# Patient Record
Sex: MALE | Race: WHITE | Employment: UNEMPLOYED | URBAN - NONMETROPOLITAN AREA
[De-identification: names, ages, dates, MRNs, and addresses within clinical notes are randomized per-mention and may not be internally consistent; named-entity substitution may affect disease eponyms.]

---

## 2018-06-25 ENCOUNTER — OFFICE VISIT (OUTPATIENT)
Dept: OTOLARYNGOLOGY | Age: 33
End: 2018-06-25
Payer: MEDICAID

## 2018-06-25 VITALS
HEIGHT: 71 IN | DIASTOLIC BLOOD PRESSURE: 88 MMHG | OXYGEN SATURATION: 99 % | BODY MASS INDEX: 24.47 KG/M2 | SYSTOLIC BLOOD PRESSURE: 112 MMHG | WEIGHT: 174.8 LBS | TEMPERATURE: 99.1 F | HEART RATE: 91 BPM | RESPIRATION RATE: 18 BRPM

## 2018-06-25 DIAGNOSIS — H93.12 TINNITUS, LEFT: ICD-10-CM

## 2018-06-25 DIAGNOSIS — H91.22 SUDDEN HEARING LOSS, LEFT: Primary | ICD-10-CM

## 2018-06-25 PROCEDURE — 99204 OFFICE O/P NEW MOD 45 MIN: CPT | Performed by: OTOLARYNGOLOGY

## 2018-06-25 RX ORDER — PREDNISONE 10 MG/1
10 TABLET ORAL SEE ADMIN INSTRUCTIONS
Qty: 60 TABLET | Refills: 0 | Status: SHIPPED | OUTPATIENT
Start: 2018-06-25 | End: 2018-07-10

## 2018-09-26 ENCOUNTER — TELEPHONE (OUTPATIENT)
Dept: OTOLARYNGOLOGY | Age: 33
End: 2018-09-26

## 2019-04-02 ENCOUNTER — NURSE TRIAGE (OUTPATIENT)
Dept: CALL CENTER | Facility: HOSPITAL | Age: 34
End: 2019-04-02

## 2019-04-02 NOTE — TELEPHONE ENCOUNTER
"Caller states he was told to go to a hospital by a physician for problems with alcohol withdrawal symptoms, not able to eat, has sweating, stated he has called several facilities, was told they could not help him. Advise that no facility can refuse to treat a person, if  Present to ED.    Reason for Disposition  • Requesting admission for alcohol abuse    Additional Information  • Negative: Coma (e.g., not moving, not talking, not responding to stimuli)  • Negative: Difficult to awaken or acting confused (e.g., disoriented, slurred speech)  • Negative: Seeing, hearing, or feeling things that are not there (i.e., visual, auditory, or tactile hallucinations)  • Negative: Slow, shallow and weak breathing  • Negative: Seizure  • Negative: Violent behavior or threatening to kill someone  • Negative: Patient attempted suicide  • Negative: Threatening suicide  • Negative: Sounds like a life-threatening emergency to the triager  • Negative: Recent significant injury, see that guideline (e.g., head, neck, chest, abdominal or extremity  injury)  • Negative: Substance abuse or dependence: question or problem related to  • Negative: Depression is main problem or symptom (e.g., feelings of sadness or hopelessness)  • Negative: SEVERE abdominal pain  • Negative: [1] Constant abdominal pain AND [2] present > 2 hours  • Negative: Blood in bowel movements (e.g., black, tarry or red blood)  (Exception: Blood on surface of BM with constipation)  • Negative: [1] Vomiting AND [2] contains red blood or black (\"coffee ground\") material  (Exception: few red streaks in vomit that only happened once)  • Negative: [1] Vomiting or dry heaves AND [2] occurring frequently (i.e., vomiting > 4 times in last 4 hours)  • Negative: Feeling very shaky (i.e., visible tremors of hands)  • Negative: Patient sounds very sick or weak to the triager  • Negative: White of the eyes have turned yellow (i.e., jaundice)  • Negative: Fever > 101.5 F (38.6 C)  • " "Negative: [1] Drinks alcohol daily AND [2] prior alcohol withdrawal seizures  • Negative: [1] Drinks alcohol daily AND [2] prior DTs (delirium tremens)    Answer Assessment - Initial Assessment Questions  1. DO YOU DRINK: \"Do you drink alcohol, including beer, wine or hard liquor?\"      Patient is having alchol withdrawal  2. HOW OFTEN: \"How many days per week do you typically drink alcohol?\"      na  3. HOW MUCH: \"How many drinks do you typically have on days when you drink?\" (one drink = 1.5 oz alcohol, 5 oz wine, 12 oz beer)      na  4. MOST: \"What is the most that you have had to drink on any one occasion in the last month?\"      na  5. LAST 24 HOURS: \"Have you had a drink within the last 24 hours?\"      na  6. DRINKING PROBLEM: \"Do you have or have you ever had a drinking problem?\"      na  7. DRUG PROBLEM: \"Are you using any other drugs?\" (e.g., yes/no; cocaine, prescription medications, etc.)      yes  8. SYMPTOMS: \"What symptoms are you currently experiencing?\" (e.g., none, tremors or shakiness, abdominal pain, vomiting, blackout spells)      Sweating, not able to eat  9. DETOX PROGRAM: \"Have you ever gone through a detox program?\"      na  10. THERAPIST: \"Do you have a counselor or therapist? Name?\"        na  11. SUPPORT: \"Who is with you now?\" \"Who do you live with?\" \"Do you have family or friends nearby who you can talk to?\" \"Are you a member of Alcoholics Anonymous?\"        na  12. PREGNANCY: \"Is there any chance you are pregnant?\" \"When was your last menstrual period?\"        na    Protocols used: ALCOHOL ABUSE AND DEPENDENCE-ADULT-AH      "

## 2019-04-09 ENCOUNTER — OFFICE VISIT (OUTPATIENT)
Dept: FAMILY MEDICINE CLINIC | Facility: CLINIC | Age: 34
End: 2019-04-09

## 2019-04-09 ENCOUNTER — RESULTS ENCOUNTER (OUTPATIENT)
Dept: FAMILY MEDICINE CLINIC | Facility: CLINIC | Age: 34
End: 2019-04-09

## 2019-04-09 VITALS
HEIGHT: 70 IN | OXYGEN SATURATION: 96 % | WEIGHT: 170 LBS | BODY MASS INDEX: 24.34 KG/M2 | DIASTOLIC BLOOD PRESSURE: 85 MMHG | HEART RATE: 93 BPM | RESPIRATION RATE: 16 BRPM | TEMPERATURE: 97 F | SYSTOLIC BLOOD PRESSURE: 138 MMHG

## 2019-04-09 DIAGNOSIS — F41.1 GENERALIZED ANXIETY DISORDER: ICD-10-CM

## 2019-04-09 DIAGNOSIS — F10.939 ALCOHOL WITHDRAWAL SYNDROME WITH COMPLICATION (HCC): ICD-10-CM

## 2019-04-09 DIAGNOSIS — R53.83 FATIGUE, UNSPECIFIED TYPE: ICD-10-CM

## 2019-04-09 DIAGNOSIS — G47.9 SLEEP DISTURBANCE: ICD-10-CM

## 2019-04-09 DIAGNOSIS — F10.939 ALCOHOL WITHDRAWAL SYNDROME WITH COMPLICATION (HCC): Primary | ICD-10-CM

## 2019-04-09 PROBLEM — I34.1 MITRAL VALVE PROLAPSE: Status: ACTIVE | Noted: 2017-03-09

## 2019-04-09 PROCEDURE — 99213 OFFICE O/P EST LOW 20 MIN: CPT | Performed by: NURSE PRACTITIONER

## 2019-04-09 RX ORDER — CARBAMAZEPINE 200 MG/1
200 TABLET ORAL 3 TIMES DAILY
Qty: 90 TABLET | Refills: 2 | Status: SHIPPED | OUTPATIENT
Start: 2019-04-09 | End: 2019-04-19

## 2019-04-09 RX ORDER — ATENOLOL 100 MG/1
100 TABLET ORAL DAILY
Qty: 30 TABLET | Refills: 2 | Status: ON HOLD | OUTPATIENT
Start: 2019-04-09

## 2019-04-09 NOTE — PROGRESS NOTES
"Chief Complaint   Patient presents with   • Drug / Alcohol Assessment        Subjective   Jackson Acuña is a 33 y.o.  male who presents today for alcohol withdrawal.    HPI:  ALCOHOL WITHDRAWAL:  He drank one fifth whiskey daily for several years.  Started drinking in his teens.  He stopped cold turkey 10 days ago.  He has been shaking and sweating since then.  He did resume drinking a \"few\" beers daily. This did not help the \"shakes and sweats\" but has helped his \"attitude.\"    He blacked out at work last week and they made him stay off until he gets a clean bill of health.  That is what brings him in today.  He admits that he did smoke some \"pot\" last week to try to quiet his symptoms.    Jackson Acuña  has a past medical history of Cat scratch fever, Mitral valve prolapse, and Substance abuse (CMS/[facility]).    Allergies   Allergen Reactions   • Penicillins Anaphylaxis     No current outpatient medications on file.  Past Medical History:   Diagnosis Date   • Cat scratch fever    • Mitral valve prolapse    • Substance abuse (CMS/[facility])      Past Surgical History:   Procedure Laterality Date   • LYMPHADENECTOMY       Social History     Socioeconomic History   • Marital status: Single     Spouse name: Not on file   • Number of children: Not on file   • Years of education: Not on file   • Highest education level: Not on file   Tobacco Use   • Smoking status: Current Every Day Smoker     Packs/day: 1.50     Years: 20.00     Pack years: 30.00     Types: Cigarettes   • Smokeless tobacco: Never Used   Substance and Sexual Activity   • Alcohol use: Yes     Comment: DAILY   • Drug use: No   • Sexual activity: Defer     Family History   Problem Relation Age of Onset   • No Known Problems Mother    • No Known Problems Father        Family history, surgical history, past medical history, Allergies and med's reviewed with patient today and updated in [employer] EMR.     ROS:  Review of Systems   Constitutional: Positive for " "diaphoresis and fatigue.   HENT: Negative.    Eyes: Negative.    Respiratory: Negative.    Cardiovascular: Negative.    Gastrointestinal: Positive for nausea and vomiting.        Initially when withdrawing.   Endocrine: Negative.    Genitourinary: Negative.    Musculoskeletal: Negative.    Skin: Negative.    Allergic/Immunologic: Negative.    Neurological: Negative.    Hematological: Negative.    Psychiatric/Behavioral: Positive for agitation, dysphoric mood and sleep disturbance. The patient is nervous/anxious.         His main reason for drinking was to go to sleep.       OBJECTIVE:  Vitals:    04/09/19 1035   BP: 138/85   BP Location: Right arm   Patient Position: Sitting   Cuff Size: Adult   Pulse: 93   Resp: 16   Temp: 97 °F (36.1 °C)   TempSrc: Tympanic   SpO2: 96%   Weight: 77.1 kg (170 lb)   Height: 177.8 cm (70\")     Physical Exam   Constitutional: He is oriented to person, place, and time. He appears well-developed and well-nourished.   HENT:   Head: Normocephalic and atraumatic.   Eyes: Conjunctivae and EOM are normal. Pupils are equal, round, and reactive to light.   Neck: Normal range of motion. Neck supple.   Cardiovascular: Normal rate, regular rhythm, normal heart sounds and intact distal pulses.   Pulmonary/Chest: Effort normal and breath sounds normal.   Abdominal: Soft. Bowel sounds are normal.   Musculoskeletal: Normal range of motion.   Neurological: He is alert and oriented to person, place, and time.   Fine tremor noted, with intention.   Skin: Skin is warm and dry. Capillary refill takes less than 2 seconds.   Psychiatric: He has a normal mood and affect. His behavior is normal. Judgment and thought content normal.   Nursing note and vitals reviewed.      ASSESSMENT/ PLAN:    Jackson was seen today for drug / alcohol assessment.    Diagnoses and all orders for this visit:    Alcohol withdrawal syndrome with complication (CMS/HCC)  -     Cancel: Comprehensive metabolic panel; Future  -     " Cancel: CBC w AUTO Differential; Future  -     Cancel: T4; Future  -     Cancel: TSH; Future  -     Cancel: Vitamin B12; Future  -     Comprehensive metabolic panel; Future  -     T4; Future  -     TSH; Future  -     CBC w AUTO Differential; Future  -     Vitamin B12; Future  -     atenolol (TENORMIN) 100 MG tablet; Take 1 tablet by mouth Daily.  -     carBAMazepine (TEGretol) 200 MG tablet; Take 1 tablet by mouth 3 (Three) Times a Day.    Generalized anxiety disorder  -     atenolol (TENORMIN) 100 MG tablet; Take 1 tablet by mouth Daily.  -     carBAMazepine (TEGretol) 200 MG tablet; Take 1 tablet by mouth 3 (Three) Times a Day.    Sleep disturbance  -     Cancel: Comprehensive metabolic panel; Future  -     Cancel: CBC w AUTO Differential; Future  -     Cancel: T4; Future  -     Cancel: TSH; Future  -     Vitamin B12; Future  -     carBAMazepine (TEGretol) 200 MG tablet; Take 1 tablet by mouth 3 (Three) Times a Day.    Fatigue, unspecified type  -     Cancel: Comprehensive metabolic panel; Future  -     Cancel: CBC w AUTO Differential; Future  -     Cancel: T4; Future  -     Cancel: TSH; Future  -     Cancel: Vitamin B12; Future  -     Comprehensive metabolic panel; Future  -     T4; Future  -     TSH; Future  -     CBC w AUTO Differential; Future  -     Vitamin B12; Future  -     carBAMazepine (TEGretol) 200 MG tablet; Take 1 tablet by mouth 3 (Three) Times a Day.    Patient instructed to purchase:  Folic acid 1 mg/daily; Ascorbic acid 100 mg BID; Vitamin B complex/daily.    Orders Placed Today:     New Medications Ordered This Visit   Medications   • atenolol (TENORMIN) 100 MG tablet     Sig: Take 1 tablet by mouth Daily.     Dispense:  30 tablet     Refill:  2   • carBAMazepine (TEGretol) 200 MG tablet     Sig: Take 1 tablet by mouth 3 (Three) Times a Day.     Dispense:  90 tablet     Refill:  2        Management Plan:     An After Visit Summary was printed and given to the patient at discharge.    Follow-up:  Return in about 2 months (around 6/9/2019) for Recheck.    Jessie Jackson, APRN 4/9/2019 10:55 AM  This note was electronically signed.

## 2019-04-10 ENCOUNTER — TELEPHONE (OUTPATIENT)
Dept: FAMILY MEDICINE CLINIC | Facility: CLINIC | Age: 34
End: 2019-04-10

## 2019-04-19 ENCOUNTER — OFFICE VISIT (OUTPATIENT)
Dept: FAMILY MEDICINE CLINIC | Facility: CLINIC | Age: 34
End: 2019-04-19

## 2019-04-19 VITALS
WEIGHT: 174.2 LBS | SYSTOLIC BLOOD PRESSURE: 121 MMHG | DIASTOLIC BLOOD PRESSURE: 85 MMHG | BODY MASS INDEX: 24.94 KG/M2 | HEIGHT: 70 IN | OXYGEN SATURATION: 98 % | HEART RATE: 64 BPM | TEMPERATURE: 98.3 F | RESPIRATION RATE: 18 BRPM

## 2019-04-19 DIAGNOSIS — F39 MOOD DISORDER (HCC): ICD-10-CM

## 2019-04-19 DIAGNOSIS — F51.01 PRIMARY INSOMNIA: ICD-10-CM

## 2019-04-19 DIAGNOSIS — R53.83 FATIGUE, UNSPECIFIED TYPE: Primary | ICD-10-CM

## 2019-04-19 DIAGNOSIS — F10.939 ALCOHOL WITHDRAWAL SYNDROME WITH COMPLICATION (HCC): ICD-10-CM

## 2019-04-19 PROCEDURE — 99213 OFFICE O/P EST LOW 20 MIN: CPT | Performed by: NURSE PRACTITIONER

## 2019-04-19 RX ORDER — BUSPIRONE HYDROCHLORIDE 10 MG/1
10 TABLET ORAL 3 TIMES DAILY
Qty: 90 TABLET | Refills: 2 | Status: SHIPPED | OUTPATIENT
Start: 2019-04-19 | End: 2019-08-26

## 2019-04-19 RX ORDER — OLANZAPINE 7.5 MG/1
7.5 TABLET ORAL
Qty: 30 TABLET | Refills: 5 | Status: SHIPPED | OUTPATIENT
Start: 2019-04-19 | End: 2019-06-07 | Stop reason: SINTOL

## 2019-04-19 NOTE — PROGRESS NOTES
Chief Complaint   Patient presents with   • Follow-up     pt would like to discuss medication given at last visit   • Depression   • Anxiety        Subjective   Jackson Acuña is a 33 y.o.  male who presents today for medication follow up.    HPI:  DEPRESSION/ANXIETY:  The treatment plan did not help.  Patient is tearful.  He is angry.  He realizes that he was medicating himself with alcohol.  He feels like he could hurt people at work because his nerves are so short (he has not harmed anyone).  At home, he wants to stay in seclusion.  His sleep is very disrupted.    Jackson Acuña  has a past medical history of Cat scratch fever, Mitral valve prolapse, and Substance abuse (CMS/HCC).    Allergies   Allergen Reactions   • Penicillins Anaphylaxis       Current Outpatient Medications:   •  atenolol (TENORMIN) 100 MG tablet, Take 1 tablet by mouth Daily., Disp: 30 tablet, Rfl: 2  •  carBAMazepine (TEGretol) 200 MG tablet, Take 1 tablet by mouth 3 (Three) Times a Day., Disp: 90 tablet, Rfl: 2  Past Medical History:   Diagnosis Date   • Cat scratch fever    • Mitral valve prolapse    • Substance abuse (CMS/HCC)      Past Surgical History:   Procedure Laterality Date   • LYMPHADENECTOMY       Social History     Socioeconomic History   • Marital status: Single     Spouse name: Not on file   • Number of children: Not on file   • Years of education: Not on file   • Highest education level: Not on file   Tobacco Use   • Smoking status: Current Every Day Smoker     Packs/day: 1.50     Years: 20.00     Pack years: 30.00     Types: Cigarettes   • Smokeless tobacco: Never Used   Substance and Sexual Activity   • Alcohol use: Yes     Comment: DAILY   • Drug use: No   • Sexual activity: Defer     Family History   Problem Relation Age of Onset   • No Known Problems Mother    • No Known Problems Father        Family history, surgical history, past medical history, Allergies and med's reviewed with patient today and updated in  "[facility] EMR.     ROS:  Review of Systems   Constitutional: Positive for activity change and fatigue.   HENT: Negative.    Eyes: Negative.    Respiratory: Negative.    Cardiovascular: Negative.    Gastrointestinal: Negative.    Endocrine: Negative.    Genitourinary: Negative.    Musculoskeletal: Positive for myalgias.   Skin: Negative.    Allergic/Immunologic: Negative.    Neurological: Negative.    Hematological: Negative.    Psychiatric/Behavioral: Positive for agitation, behavioral problems, decreased concentration, dysphoric mood and sleep disturbance. The patient is nervous/anxious.        OBJECTIVE:  Vitals:    04/19/19 0855   BP: 121/85   BP Location: Left arm   Patient Position: Sitting   Cuff Size: Adult   Pulse: 64   Resp: 18   Temp: 98.3 °F (36.8 °C)   TempSrc: Tympanic   SpO2: 98%   Weight: 79 kg (174 lb 3.2 oz)   Height: 177.8 cm (70\")     Physical Exam   Constitutional: He is oriented to person, place, and time. He appears well-developed and well-nourished.   HENT:   Head: Normocephalic and atraumatic.   Eyes: Conjunctivae and EOM are normal. Pupils are equal, round, and reactive to light.   Neck: Normal range of motion. Neck supple.   Cardiovascular: Normal rate, regular rhythm and normal heart sounds.   Pulmonary/Chest: Effort normal and breath sounds normal.   Abdominal: Soft.   Musculoskeletal: Normal range of motion.   Neurological: He is alert and oriented to person, place, and time.   Skin: Skin is warm and dry. Capillary refill takes less than 2 seconds.   Psychiatric: His behavior is normal. Judgment and thought content normal.   Tearful.  Flat affect.  Wife accompanied him today.   Nursing note and vitals reviewed.      ASSESSMENT/ PLAN:    Jackson was seen today for follow-up, depression and anxiety.    Diagnoses and all orders for this visit:    Fatigue, unspecified type    Mood disorder (CMS/HCC)  -     OLANZapine (zyPREXA) 7.5 MG tablet; Take 1 tablet by mouth every night at bedtime.  -     " busPIRone (BUSPAR) 10 MG tablet; Take 1 tablet by mouth 3 (Three) Times a Day.    Primary insomnia    Alcohol withdrawal syndrome with complication (CMS/HCC)        Orders Placed Today:     New Medications Ordered This Visit   Medications   • OLANZapine (zyPREXA) 7.5 MG tablet     Sig: Take 1 tablet by mouth every night at bedtime.     Dispense:  30 tablet     Refill:  5   • busPIRone (BUSPAR) 10 MG tablet     Sig: Take 1 tablet by mouth 3 (Three) Times a Day.     Dispense:  90 tablet     Refill:  2        Management Plan:     An After Visit Summary was printed and given to the patient at discharge.    Follow-up: Return for keep scheduled appt.    Jessie Jackson, KINGSLEY 4/19/2019 9:25 AM  This note was electronically signed.

## 2019-06-07 ENCOUNTER — OFFICE VISIT (OUTPATIENT)
Dept: FAMILY MEDICINE CLINIC | Facility: CLINIC | Age: 34
End: 2019-06-07

## 2019-06-07 VITALS
SYSTOLIC BLOOD PRESSURE: 123 MMHG | BODY MASS INDEX: 25.8 KG/M2 | HEIGHT: 70 IN | OXYGEN SATURATION: 98 % | HEART RATE: 72 BPM | WEIGHT: 180.2 LBS | RESPIRATION RATE: 19 BRPM | DIASTOLIC BLOOD PRESSURE: 83 MMHG | TEMPERATURE: 97.9 F

## 2019-06-07 DIAGNOSIS — R45.4 DIFFICULTY CONTROLLING ANGER: ICD-10-CM

## 2019-06-07 DIAGNOSIS — F41.9 ANXIETY: ICD-10-CM

## 2019-06-07 DIAGNOSIS — I10 ESSENTIAL HYPERTENSION: Primary | ICD-10-CM

## 2019-06-07 DIAGNOSIS — R07.89 COSTOCHONDRAL CHEST PAIN: ICD-10-CM

## 2019-06-07 DIAGNOSIS — R45.89 DEPRESSED MOOD: ICD-10-CM

## 2019-06-07 DIAGNOSIS — F10.10 ALCOHOL ABUSE: ICD-10-CM

## 2019-06-07 PROCEDURE — 99213 OFFICE O/P EST LOW 20 MIN: CPT | Performed by: NURSE PRACTITIONER

## 2019-06-07 NOTE — PROGRESS NOTES
Chief Complaint   Patient presents with   • Depression     Follow-Up, pt states he is getting worse   • Chest Pain     pt states only hurts when he takes a deep breath, x6 months        Subjective   Jackson Acuña is a 34 y.o.  male who presents today for follow up depression/chest pain.    HPI:  DEPRESSION:  Patient states he tolerates the Buspar well but he stopped the Zyprexa.  He has gotten in trouble at work.  He fights at home constantly.  He is on the brink of divorce.  He is tearful. He states his sleep is terrible but admits that it is mostly due to interruption.  He gets woken up (for stupid stuff) and then he is so mad that he can't go back to sleep.    CHEST PAIN:  This comes and goes.  When present, it is only with a deep breath.      NAUSEA:  Patient has some nausea at all times.    Jackson Acuña  has a past medical history of Cat scratch fever, Depression, Hypertension, Mitral valve prolapse, Mitral valve prolapse, and Substance abuse (CMS/HCC).    Allergies   Allergen Reactions   • Penicillins Anaphylaxis       Current Outpatient Medications:   •  atenolol (TENORMIN) 100 MG tablet, Take 1 tablet by mouth Daily., Disp: 30 tablet, Rfl: 2  •  busPIRone (BUSPAR) 10 MG tablet, Take 1 tablet by mouth 3 (Three) Times a Day., Disp: 90 tablet, Rfl: 2  •  Cariprazine HCl (VRAYLAR) 1.5 MG capsule capsule, Take 1 capsule by mouth Daily., Disp: 28 capsule, Rfl: 0  Past Medical History:   Diagnosis Date   • Cat scratch fever    • Depression    • Hypertension    • Mitral valve prolapse    • Mitral valve prolapse    • Substance abuse (CMS/HCC)      Past Surgical History:   Procedure Laterality Date   • ANKLE SURGERY Right    • CYST REMOVAL Left     left wrist   • LYMPHADENECTOMY     • MANDIBLE FRACTURE SURGERY       Social History     Socioeconomic History   • Marital status: Single     Spouse name: Not on file   • Number of children: Not on file   • Years of education: Not on file   • Highest education level:  Not on file   Tobacco Use   • Smoking status: Current Every Day Smoker     Packs/day: 2.00     Years: 20.00     Pack years: 40.00     Types: Cigarettes   • Smokeless tobacco: Current User   Substance and Sexual Activity   • Alcohol use: Yes     Comment: DAILY   • Drug use: Yes     Types: Marijuana   • Sexual activity: Defer     Family History   Problem Relation Age of Onset   • No Known Problems Mother    • No Known Problems Father        Family history, surgical history, past medical history, Allergies and med's reviewed with patient today and updated in [facility] EMR.     ROS:  Review of Systems   Constitutional: Negative.  Negative for fatigue, fever and unexpected weight change.   HENT: Negative.  Negative for facial swelling, sore throat and trouble swallowing.    Eyes: Negative.  Negative for photophobia, discharge and visual disturbance.   Respiratory: Negative.  Negative for cough, chest tightness and shortness of breath.         Upper chest pain with deep breath.   Cardiovascular: Negative.  Negative for chest pain and palpitations.   Gastrointestinal: Positive for nausea. Negative for abdominal pain, diarrhea and vomiting.   Endocrine: Negative.  Negative for polydipsia, polyphagia and polyuria.   Genitourinary: Negative.  Negative for dysuria, flank pain and frequency.   Musculoskeletal: Negative.  Negative for back pain, gait problem and neck pain.   Skin: Negative.  Negative for rash.   Allergic/Immunologic: Negative.    Neurological: Negative.  Negative for dizziness, light-headedness and headaches.   Hematological: Negative.    Psychiatric/Behavioral: Positive for agitation, behavioral problems, dysphoric mood and sleep disturbance. Negative for self-injury and suicidal ideas. The patient is nervous/anxious.        OBJECTIVE:  Vitals:    06/07/19 0927   BP: 123/83   BP Location: Right arm   Patient Position: Sitting   Cuff Size: Adult   Pulse: 72   Resp: 19   Temp: 97.9 °F (36.6 °C)   TempSrc: Temporal  "  SpO2: 98%   Weight: 81.7 kg (180 lb 3.2 oz)   Height: 177.8 cm (70\")     Physical Exam   Constitutional: He is oriented to person, place, and time. He appears well-developed and well-nourished. No distress.   HENT:   Head: Normocephalic and atraumatic.   Eyes: Conjunctivae and EOM are normal. Pupils are equal, round, and reactive to light.   Neck: Normal range of motion. Neck supple.   Cardiovascular: Normal rate, regular rhythm, normal heart sounds and intact distal pulses.   No murmur heard.  Pulmonary/Chest: Effort normal and breath sounds normal. No respiratory distress.   Abdominal: Soft. Bowel sounds are normal. He exhibits no distension. There is no tenderness.   Musculoskeletal: Normal range of motion. He exhibits tenderness. He exhibits no edema.   Tenderness to palpation of left upper chest wall.   Neurological: He is alert and oriented to person, place, and time.   Skin: Skin is warm and dry. Capillary refill takes less than 2 seconds. He is not diaphoretic. No erythema.   Psychiatric: Judgment and thought content normal.   Patient appears tearful and upset; then changes to some anger in discussion regarding work and home environment.    Nursing note and vitals reviewed.      ASSESSMENT/ PLAN:    Jackson was seen today for depression and chest pain.    Diagnoses and all orders for this visit:    Essential hypertension    Depressed mood  -     Cariprazine HCl (VRAYLAR) 1.5 MG capsule capsule; Take 1 capsule by mouth Daily.    Difficulty controlling anger  -     Cariprazine HCl (VRAYLAR) 1.5 MG capsule capsule; Take 1 capsule by mouth Daily.    Anxiety  -     Cariprazine HCl (VRAYLAR) 1.5 MG capsule capsule; Take 1 capsule by mouth Daily.    Alcohol abuse    Costochondral chest pain    Recommended use of OTC NSAID for control of chest wall pain.    Orders Placed Today:     New Medications Ordered This Visit   Medications   • Cariprazine HCl (VRAYLAR) 1.5 MG capsule capsule     Sig: Take 1 capsule by mouth " Daily.     Dispense:  28 capsule     Refill:  0     Order Specific Question:   Lot Number?     Answer:   [id number]     Order Specific Question:   Expiration Date?     Answer:   8/1/2021     Order Specific Question:   Quantity     Answer:   28        Management Plan:     An After Visit Summary was printed and given to the patient at discharge.    Follow-up: Return in about 3 months (around 9/7/2019) for Next scheduled follow up.    Jessie Jackson, KINGSLEY 6/7/2019 11:57 AM  This note was electronically signed.

## 2019-08-26 ENCOUNTER — OFFICE VISIT (OUTPATIENT)
Dept: FAMILY MEDICINE CLINIC | Facility: CLINIC | Age: 34
End: 2019-08-26

## 2019-08-26 VITALS
HEIGHT: 70 IN | WEIGHT: 186.8 LBS | OXYGEN SATURATION: 96 % | HEART RATE: 74 BPM | BODY MASS INDEX: 26.74 KG/M2 | RESPIRATION RATE: 16 BRPM | DIASTOLIC BLOOD PRESSURE: 73 MMHG | SYSTOLIC BLOOD PRESSURE: 111 MMHG

## 2019-08-26 DIAGNOSIS — R45.89 DEPRESSED MOOD: ICD-10-CM

## 2019-08-26 DIAGNOSIS — F41.9 ANXIETY: ICD-10-CM

## 2019-08-26 DIAGNOSIS — R45.4 DIFFICULTY CONTROLLING ANGER: ICD-10-CM

## 2019-08-26 DIAGNOSIS — R53.83 FATIGUE, UNSPECIFIED TYPE: Primary | ICD-10-CM

## 2019-08-26 DIAGNOSIS — R45.86 MOOD ALTERED: ICD-10-CM

## 2019-08-26 PROCEDURE — 99213 OFFICE O/P EST LOW 20 MIN: CPT | Performed by: NURSE PRACTITIONER

## 2019-08-26 RX ORDER — HYDROXYZINE HYDROCHLORIDE 25 MG/1
25 TABLET, FILM COATED ORAL 3 TIMES DAILY PRN
Qty: 30 TABLET | Refills: 2 | Status: SHIPPED | OUTPATIENT
Start: 2019-08-26 | End: 2020-07-01

## 2019-08-26 NOTE — PROGRESS NOTES
Chief Complaint   Patient presents with   • Med Refill     Vraylar   • Medication Problem     Buspar        Subjective   Jackson Acuña is a 34 y.o.  male who presents today for med discussion.    HPI:  Patient presents today expressing that the Vraylar has helped him more than any medication he has taken for his mood disorder. He is very pleased but unfortunately ran out of samples prior to this appointment and states his mood quickly reverted to the anger/sadness/quick temper.  He states the Buspar doesn't seem to do anything now, he has even tried taking 2 at one time.      Jackson Acuña  has a past medical history of Cat scratch fever, Depression, Hypertension, Mitral valve prolapse, Mitral valve prolapse, and Substance abuse (CMS/HCC).    Allergies   Allergen Reactions   • Penicillins Anaphylaxis       Current Outpatient Medications:   •  atenolol (TENORMIN) 100 MG tablet, Take 1 tablet by mouth Daily., Disp: 30 tablet, Rfl: 2  •  Cariprazine HCl (VRAYLAR) 1.5 MG capsule capsule, Take 2 capsules by mouth Daily., Disp: 30 capsule, Rfl: 2  •  hydrOXYzine (ATARAX) 25 MG tablet, Take 1 tablet by mouth 3 (Three) Times a Day As Needed for Anxiety., Disp: 30 tablet, Rfl: 2  Past Medical History:   Diagnosis Date   • Cat scratch fever    • Depression    • Hypertension    • Mitral valve prolapse    • Mitral valve prolapse    • Substance abuse (CMS/HCC)      Past Surgical History:   Procedure Laterality Date   • ANKLE SURGERY Right    • CYST REMOVAL Left     left wrist   • LYMPHADENECTOMY     • MANDIBLE FRACTURE SURGERY       Social History     Socioeconomic History   • Marital status: Single     Spouse name: Not on file   • Number of children: Not on file   • Years of education: Not on file   • Highest education level: Not on file   Tobacco Use   • Smoking status: Current Every Day Smoker     Packs/day: 2.00     Years: 20.00     Pack years: 40.00     Types: Cigarettes   • Smokeless tobacco: Current User  "  Substance and Sexual Activity   • Alcohol use: Yes     Comment: every other day   • Drug use: Yes     Types: Marijuana   • Sexual activity: Defer     Family History   Problem Relation Age of Onset   • No Known Problems Mother    • No Known Problems Father        Family history, surgical history, past medical history, Allergies and med's reviewed with patient today and updated in [facility] EMR.     ROS:  Review of Systems   Constitutional: Positive for fatigue. Negative for fever and unexpected weight change.   HENT: Negative.  Negative for facial swelling, sore throat and trouble swallowing.    Eyes: Negative.  Negative for photophobia, discharge and visual disturbance.   Respiratory: Negative.  Negative for cough, chest tightness and shortness of breath.    Cardiovascular: Negative.  Negative for chest pain and palpitations.   Gastrointestinal: Negative.  Negative for abdominal pain, diarrhea, nausea and vomiting.   Endocrine: Negative.  Negative for polydipsia, polyphagia and polyuria.   Genitourinary: Negative.  Negative for dysuria, flank pain and frequency.   Musculoskeletal: Negative.  Negative for back pain, gait problem and neck pain.   Skin: Negative.  Negative for rash.   Allergic/Immunologic: Negative.    Neurological: Negative.  Negative for dizziness, light-headedness and headaches.   Hematological: Negative.    Psychiatric/Behavioral: Positive for agitation, decreased concentration and dysphoric mood. Negative for self-injury and suicidal ideas. The patient is nervous/anxious.        OBJECTIVE:  Vitals:    08/26/19 1337   BP: 111/73   BP Location: Right arm   Patient Position: Sitting   Cuff Size: Adult   Pulse: 74   Resp: 16   SpO2: 96%   Weight: 84.7 kg (186 lb 12.8 oz)   Height: 177.8 cm (70\")     Physical Exam   Constitutional: He is oriented to person, place, and time. He appears well-developed and well-nourished. No distress.   HENT:   Head: Normocephalic and atraumatic.   Eyes: Conjunctivae and EOM " are normal. Pupils are equal, round, and reactive to light.   Neck: Normal range of motion. Neck supple.   Cardiovascular: Normal rate, regular rhythm, normal heart sounds and intact distal pulses.   No murmur heard.  Pulmonary/Chest: Effort normal and breath sounds normal. No respiratory distress.   Abdominal: Soft. Bowel sounds are normal. He exhibits no distension. There is no tenderness.   Musculoskeletal: Normal range of motion. He exhibits no edema.   Neurological: He is alert and oriented to person, place, and time.   Skin: Skin is warm and dry. Capillary refill takes less than 2 seconds. He is not diaphoretic. No erythema.   Psychiatric: He has a normal mood and affect. His behavior is normal. Judgment and thought content normal.   Apparently anxious but able to express himself well.   Nursing note and vitals reviewed.      ASSESSMENT/ PLAN:    Jackson was seen today for med refill and medication problem.    Diagnoses and all orders for this visit:    Fatigue, unspecified type    Depressed mood  -     Cariprazine HCl (VRAYLAR) 1.5 MG capsule capsule; Take 2 capsules by mouth Daily.    Difficulty controlling anger  -     Cariprazine HCl (VRAYLAR) 1.5 MG capsule capsule; Take 2 capsules by mouth Daily.    Anxiety  -     Cariprazine HCl (VRAYLAR) 1.5 MG capsule capsule; Take 2 capsules by mouth Daily.  -     hydrOXYzine (ATARAX) 25 MG tablet; Take 1 tablet by mouth 3 (Three) Times a Day As Needed for Anxiety.    Mood altered        Orders Placed Today:     New Medications Ordered This Visit   Medications   • Cariprazine HCl (VRAYLAR) 1.5 MG capsule capsule     Sig: Take 2 capsules by mouth Daily.     Dispense:  30 capsule     Refill:  2     Order Specific Question:   Lot Number?     Answer:   [id number]     Order Specific Question:   Expiration Date?     Answer:   8/1/2021     Order Specific Question:   Quantity     Answer:   28   • hydrOXYzine (ATARAX) 25 MG tablet     Sig: Take 1 tablet by mouth 3 (Three) Times a  Day As Needed for Anxiety.     Dispense:  30 tablet     Refill:  2     Substitute with capsule for payment purposes if needed.        Management Plan:     An After Visit Summary was printed and given to the patient at discharge.    Follow-up: Return in about 3 months (around 11/26/2019) for Next scheduled follow up.    Jessie Jackson, APRN 8/26/2019 2:15 PM  This note was electronically signed.

## 2019-08-27 DIAGNOSIS — R45.89 DEPRESSED MOOD: Primary | ICD-10-CM

## 2019-08-27 DIAGNOSIS — R45.86 MOOD ALTERED: ICD-10-CM

## 2019-08-27 DIAGNOSIS — R45.4 DIFFICULTY CONTROLLING ANGER: ICD-10-CM

## 2019-08-27 DIAGNOSIS — F41.9 ANXIETY: ICD-10-CM

## 2020-07-01 ENCOUNTER — OFFICE VISIT (OUTPATIENT)
Dept: FAMILY MEDICINE CLINIC | Facility: CLINIC | Age: 35
End: 2020-07-01

## 2020-07-01 VITALS
OXYGEN SATURATION: 97 % | WEIGHT: 188 LBS | HEART RATE: 64 BPM | SYSTOLIC BLOOD PRESSURE: 128 MMHG | HEIGHT: 70 IN | DIASTOLIC BLOOD PRESSURE: 64 MMHG | TEMPERATURE: 97.8 F | BODY MASS INDEX: 26.92 KG/M2

## 2020-07-01 DIAGNOSIS — F17.200 SMOKER: ICD-10-CM

## 2020-07-01 DIAGNOSIS — I10 ESSENTIAL HYPERTENSION: ICD-10-CM

## 2020-07-01 DIAGNOSIS — R13.19 ESOPHAGEAL DYSPHAGIA: Primary | ICD-10-CM

## 2020-07-01 PROCEDURE — 99214 OFFICE O/P EST MOD 30 MIN: CPT | Performed by: FAMILY MEDICINE

## 2020-07-01 NOTE — PROGRESS NOTES
"OFFICE VISIT NOTE:    Jackson Acuña is a 35 y.o. male who presents today for Difficulty Swallowing (feeling of foreign body in throat) and smokers cough.     Feels like a \"cue ball\" is coming up in his throat, a constant pressure in the throat, more noticeable after he coughs (has had a smoker's cough for years). Feels it on the inside not the outside of the throat - indicates by his hands near the thyroid notch. Some foods get hung or caught in there. Had tater tots the other night at home and noticed it then.     Family history of Stoll syndrome.     History of HTN, but hasn't been taking the meds lately for months he relates.     Difficulty Swallowing   This is a chronic problem. The current episode started 1 to 4 weeks ago. The problem occurs daily. The problem has been unchanged. Pertinent negatives include no abdominal pain, chest pain, chills, fatigue, fever, headaches, nausea, rash, sore throat, swollen glands or vomiting. The symptoms are aggravated by eating and drinking. He has tried rest for the symptoms. The treatment provided mild relief.   Hypertension   This is a chronic problem. The current episode started more than 1 year ago. The problem is unchanged. The problem is controlled. Pertinent negatives include no chest pain, headaches, orthopnea, palpitations, peripheral edema, PND or shortness of breath. There are no associated agents to hypertension. The current treatment provides significant improvement. There are no compliance problems.         Past medical/surgical history, Family history, Social history, Allergies and Medications have been reviewed with the patient today and are updated in [facility] EMR. See below.  Past Medical History:   Diagnosis Date   • Cat scratch fever    • Depression    • Ganglion cyst of wrist, left    • Hypertension    • Mitral valve prolapse    • Mitral valve prolapse    • Substance abuse (CMS/HCC)    • Type 2 HSV infection of penis      Past Surgical History:   Procedure " "Laterality Date   • ANKLE SURGERY Right    • CYST REMOVAL Left     left wrist   • LYMPHADENECTOMY     • MANDIBLE FRACTURE SURGERY       Family History   Problem Relation Age of Onset   • No Known Problems Mother    • No Known Problems Father      Social History     Tobacco Use   • Smoking status: Current Every Day Smoker     Packs/day: 2.00     Years: 20.00     Pack years: 40.00     Types: Cigarettes   • Smokeless tobacco: Current User   Substance Use Topics   • Alcohol use: Yes     Alcohol/week: 1.0 standard drinks     Types: 1 Shots of liquor per week     Frequency: 2-3 times a week     Drinks per session: 1 or 2     Comment: every other day   • Drug use: Yes     Types: Marijuana       ALLERGIES:  Penicillins and Ibuprofen    CURRENT MEDS:    Current Outpatient Medications:   •  atenolol (TENORMIN) 100 MG tablet, Take 1 tablet by mouth Daily., Disp: 30 tablet, Rfl: 2    REVIEW OF SYSTEMS:  Review of Systems   Constitutional: Negative for activity change, appetite change, chills, fatigue, fever, unexpected weight gain and unexpected weight loss.   HENT: Negative for sore throat and swollen glands.    Respiratory: Negative for shortness of breath.    Cardiovascular: Negative for chest pain, palpitations, orthopnea and PND.   Gastrointestinal: Negative for abdominal pain, nausea and vomiting.   Genitourinary: Negative for difficulty urinating.   Skin: Negative for rash.   Neurological: Negative for syncope and headache.       PHYSICAL EXAMINATION:  Vital Signs:  /64 (BP Location: Left arm, Patient Position: Sitting, Cuff Size: Adult)   Pulse 64   Temp 97.8 °F (36.6 °C) (Skin)   Ht 177.8 cm (70\")   Wt 85.3 kg (188 lb)   SpO2 97%   BMI 26.98 kg/m²   Vitals:    07/01/20 0945   Patient Position: Sitting       Physical Exam   Constitutional: He is oriented to person, place, and time. He appears well-developed and well-nourished. No distress.   HENT:   Head: Normocephalic and atraumatic.   Mouth/Throat: " Oropharynx is clear and moist.   Neck: Normal range of motion. Neck supple. No JVD present.   Cardiovascular: Normal rate, regular rhythm, normal heart sounds and intact distal pulses.   Pulmonary/Chest: Effort normal and breath sounds normal. No respiratory distress.   Abdominal: Soft. He exhibits no distension. There is no tenderness.   Musculoskeletal: Normal range of motion. He exhibits no edema.   Neurological: He is alert and oriented to person, place, and time. No cranial nerve deficit.   Skin: Skin is warm and dry. Capillary refill takes less than 2 seconds. No rash noted.   Psychiatric: He has a normal mood and affect. His behavior is normal.   Nursing note and vitals reviewed.      Procedures    ASSESSMENT/ PLAN:  Problem List Items Addressed This Visit        Digestive    Esophageal dysphagia - Primary    Relevant Orders    Ambulatory Referral to ENT (Otolaryngology)      Other Visit Diagnoses     Essential hypertension        Smoker        Relevant Orders    Ambulatory Referral to ENT (Otolaryngology)        DC smoking, alcohol and marijuana use.     Specific Patient Instructions:  MEDICATION Instructions: Encouraged patient to continue routine medicines as prescribed and maintain compliance. Patient instructed to report any adverse side effects or reactions to medicines promptly to the office. Patient instructed to make us aware of any OTC or herbal meds or supplement use.    DIET Recommendations: Patient instructed and provided information on the following nutrition and diet recommendations: Calorie restriction for weight reduction and maintenance. Necessity for adequate daily intake of fluids/water. Also, diet information provided in AVS for specifics.    EXERCISE Instructions: Discussed with patient the need for routine aerobic activity for cardiovascular fitness, 3 times a week for about 30 minutes. Daily exercise for increased fitness and weight reduction goals.    SMOKING  Recommendations: Counseled patient and encouraged them on smoking and tobacco cessation if applicable. Discussed the benefits to all body systems with smoking/tobacco cessation, including decreased cardiac/lung/stroke/cancer risk. Encouraged no vaping use.    HEALTH MAINTENANCE:  Counseling provided to patient/family about routine health maintenance and ANNUAL physicals/labs. Counseling on recommended Vaccinations appropriate for age needed.        Patient's Body mass index is 26.98 kg/m². BMI is within normal parameters. No follow-up required..      Medications or Orders placed this visit:  Orders Placed This Encounter   Procedures   • Ambulatory Referral to ENT (Otolaryngology)     Referral Priority:   Urgent     Referral Type:   Consultation     Referral Reason:   Specialty Services Required     Requested Specialty:   Otolaryngology     Number of Visits Requested:   1       Medications DISCONTINUED this visit:  Medications Discontinued During This Encounter   Medication Reason   • hydrOXYzine (ATARAX) 25 MG tablet Not Efficacious   • Cariprazine HCl (VRAYLAR) 3 MG capsule Not Efficacious       FOLLOW-UP:  Return for Recheck, Next scheduled follow up.    I discussed the patients findings and my recommendations with patient.  An After Visit Summary (AVS) was printed and given to the patient at discharge.        Brandon Darling MD, FAAFP  7/5/2020

## 2021-06-14 ENCOUNTER — HOSPITAL ENCOUNTER (EMERGENCY)
Age: 36
Discharge: HOME | End: 2021-06-14
Attending: EMERGENCY MEDICINE
Payer: MEDICAID

## 2021-06-14 VITALS
OXYGEN SATURATION: 95 % | HEART RATE: 83 BPM | SYSTOLIC BLOOD PRESSURE: 123 MMHG | DIASTOLIC BLOOD PRESSURE: 91 MMHG | RESPIRATION RATE: 22 BRPM | TEMPERATURE: 98.5 F

## 2021-06-14 DIAGNOSIS — T78.40XA ALLERGIC REACTION, INITIAL ENCOUNTER: Primary | ICD-10-CM

## 2021-06-14 LAB
ALBUMIN SERPL-MCNC: 4.9 G/DL (ref 3.5–5.2)
ALP BLD-CCNC: 139 U/L (ref 40–130)
ALT SERPL-CCNC: 78 U/L (ref 5–41)
ANION GAP SERPL CALCULATED.3IONS-SCNC: 15 MMOL/L (ref 7–19)
AST SERPL-CCNC: 95 U/L (ref 5–40)
BASOPHILS ABSOLUTE: 0.1 K/UL (ref 0–0.2)
BASOPHILS RELATIVE PERCENT: 0.5 % (ref 0–1)
BILIRUB SERPL-MCNC: 1.5 MG/DL (ref 0.2–1.2)
BUN BLDV-MCNC: 7 MG/DL (ref 6–20)
CALCIUM SERPL-MCNC: 9.5 MG/DL (ref 8.6–10)
CHLORIDE BLD-SCNC: 99 MMOL/L (ref 98–111)
CO2: 22 MMOL/L (ref 22–29)
CREAT SERPL-MCNC: 0.9 MG/DL (ref 0.5–1.2)
EOSINOPHILS ABSOLUTE: 0.4 K/UL (ref 0–0.6)
EOSINOPHILS RELATIVE PERCENT: 3.7 % (ref 0–5)
GFR AFRICAN AMERICAN: >59
GFR NON-AFRICAN AMERICAN: >60
GLUCOSE BLD-MCNC: 113 MG/DL (ref 74–109)
HCT VFR BLD CALC: 45.7 % (ref 42–52)
HEMOGLOBIN: 16.4 G/DL (ref 14–18)
IMMATURE GRANULOCYTES #: 0.1 K/UL
LYMPHOCYTES ABSOLUTE: 1 K/UL (ref 1.1–4.5)
LYMPHOCYTES RELATIVE PERCENT: 9.9 % (ref 20–40)
MCH RBC QN AUTO: 33.9 PG (ref 27–31)
MCHC RBC AUTO-ENTMCNC: 35.9 G/DL (ref 33–37)
MCV RBC AUTO: 94.4 FL (ref 80–94)
MONOCYTES ABSOLUTE: 0.7 K/UL (ref 0–0.9)
MONOCYTES RELATIVE PERCENT: 7.3 % (ref 0–10)
NEUTROPHILS ABSOLUTE: 7.9 K/UL (ref 1.5–7.5)
NEUTROPHILS RELATIVE PERCENT: 77.9 % (ref 50–65)
PDW BLD-RTO: 13 % (ref 11.5–14.5)
PLATELET # BLD: 249 K/UL (ref 130–400)
PMV BLD AUTO: 10.1 FL (ref 9.4–12.4)
POTASSIUM REFLEX MAGNESIUM: 3.7 MMOL/L (ref 3.5–5)
RBC # BLD: 4.84 M/UL (ref 4.7–6.1)
SODIUM BLD-SCNC: 136 MMOL/L (ref 136–145)
TOTAL CK: 180 U/L (ref 39–308)
TOTAL PROTEIN: 7.8 G/DL (ref 6.6–8.7)
WBC # BLD: 10.1 K/UL (ref 4.8–10.8)

## 2021-06-14 PROCEDURE — 36415 COLL VENOUS BLD VENIPUNCTURE: CPT

## 2021-06-14 PROCEDURE — 96375 TX/PRO/DX INJ NEW DRUG ADDON: CPT

## 2021-06-14 PROCEDURE — 82550 ASSAY OF CK (CPK): CPT

## 2021-06-14 PROCEDURE — 6360000002 HC RX W HCPCS: Performed by: NURSE PRACTITIONER

## 2021-06-14 PROCEDURE — 99282 EMERGENCY DEPT VISIT SF MDM: CPT

## 2021-06-14 PROCEDURE — 80053 COMPREHEN METABOLIC PANEL: CPT

## 2021-06-14 PROCEDURE — 96374 THER/PROPH/DIAG INJ IV PUSH: CPT

## 2021-06-14 PROCEDURE — 85025 COMPLETE CBC W/AUTO DIFF WBC: CPT

## 2021-06-14 PROCEDURE — 6360000002 HC RX W HCPCS

## 2021-06-14 PROCEDURE — 2580000003 HC RX 258: Performed by: NURSE PRACTITIONER

## 2021-06-14 RX ORDER — SULFAMETHOXAZOLE AND TRIMETHOPRIM 800; 160 MG/1; MG/1
1 TABLET ORAL 2 TIMES DAILY
Qty: 20 TABLET | Refills: 0 | Status: SHIPPED | OUTPATIENT
Start: 2021-06-14 | End: 2021-06-24

## 2021-06-14 RX ORDER — DIPHENHYDRAMINE HYDROCHLORIDE 50 MG/ML
25 INJECTION INTRAMUSCULAR; INTRAVENOUS ONCE
Status: COMPLETED | OUTPATIENT
Start: 2021-06-14 | End: 2021-06-14

## 2021-06-14 RX ORDER — ONDANSETRON 2 MG/ML
4 INJECTION INTRAMUSCULAR; INTRAVENOUS ONCE
Status: COMPLETED | OUTPATIENT
Start: 2021-06-14 | End: 2021-06-14

## 2021-06-14 RX ORDER — CEPHALEXIN 500 MG/1
500 CAPSULE ORAL 2 TIMES DAILY
Qty: 20 CAPSULE | Refills: 0 | Status: SHIPPED | OUTPATIENT
Start: 2021-06-14 | End: 2021-06-24

## 2021-06-14 RX ORDER — ONDANSETRON 2 MG/ML
INJECTION INTRAMUSCULAR; INTRAVENOUS
Status: COMPLETED
Start: 2021-06-14 | End: 2021-06-14

## 2021-06-14 RX ORDER — PREDNISONE 50 MG/1
50 TABLET ORAL DAILY
Qty: 5 TABLET | Refills: 0 | Status: SHIPPED | OUTPATIENT
Start: 2021-06-14 | End: 2021-06-19

## 2021-06-14 RX ORDER — SODIUM CHLORIDE, SODIUM LACTATE, POTASSIUM CHLORIDE, CALCIUM CHLORIDE 600; 310; 30; 20 MG/100ML; MG/100ML; MG/100ML; MG/100ML
1000 INJECTION, SOLUTION INTRAVENOUS ONCE
Status: COMPLETED | OUTPATIENT
Start: 2021-06-14 | End: 2021-06-14

## 2021-06-14 RX ADMIN — ONDANSETRON 4 MG: 2 INJECTION INTRAMUSCULAR; INTRAVENOUS at 13:11

## 2021-06-14 RX ADMIN — SODIUM CHLORIDE, SODIUM LACTATE, POTASSIUM CHLORIDE, AND CALCIUM CHLORIDE 1000 ML: 600; 310; 30; 20 INJECTION, SOLUTION INTRAVENOUS at 13:11

## 2021-06-14 RX ADMIN — ONDANSETRON HYDROCHLORIDE 4 MG: 2 INJECTION, SOLUTION INTRAMUSCULAR; INTRAVENOUS at 13:11

## 2021-06-14 RX ADMIN — DIPHENHYDRAMINE HYDROCHLORIDE 25 MG: 50 INJECTION INTRAMUSCULAR; INTRAVENOUS at 13:10

## 2021-06-14 ASSESSMENT — ENCOUNTER SYMPTOMS
NAUSEA: 1
EYE DISCHARGE: 0
FACIAL SWELLING: 0
SHORTNESS OF BREATH: 0
ABDOMINAL PAIN: 0
VOMITING: 1
EYE ITCHING: 0
CHEST TIGHTNESS: 0

## 2021-06-14 ASSESSMENT — PAIN DESCRIPTION - LOCATION: LOCATION: ARM

## 2021-06-14 ASSESSMENT — PAIN DESCRIPTION - PAIN TYPE: TYPE: ACUTE PAIN

## 2021-06-14 ASSESSMENT — PAIN SCALES - GENERAL: PAINLEVEL_OUTOF10: 4

## 2021-06-15 NOTE — ED PROVIDER NOTES
history on file. SOCIAL HISTORY       Social History     Socioeconomic History    Marital status: Single     Spouse name: Not on file    Number of children: Not on file    Years of education: Not on file    Highest education level: Not on file   Occupational History    Not on file   Tobacco Use    Smoking status: Current Every Day Smoker     Packs/day: 1.50    Smokeless tobacco: Current User   Vaping Use    Vaping Use: Never used   Substance and Sexual Activity    Alcohol use: Yes    Drug use: Not on file    Sexual activity: Not on file   Other Topics Concern    Not on file   Social History Narrative    Not on file     Social Determinants of Health     Financial Resource Strain:     Difficulty of Paying Living Expenses:    Food Insecurity:     Worried About Running Out of Food in the Last Year:     [address] in the Last Year:    Transportation Needs:     Lack of Transportation (Medical):  Lack of Transportation (Non-Medical):    Physical Activity:     Days of Exercise per Week:     Minutes of Exercise per Session:    Stress:     Feeling of Stress :    Social Connections:     Frequency of Communication with Friends and Family:     Frequency of Social Gatherings with Friends and Family:     Attends [employer] Services:     Active Member of Clubs or Organizations:     Attends Club or Organization Meetings:     Marital Status:    Intimate Partner Violence:     Fear of Current or Ex-Partner:     Emotionally Abused:     Physically Abused:     Sexually Abused:        SCREENINGS                        PHYSICAL EXAM    (up to 7 for level 4, 8 or more for level 5)     ED Triage Vitals [06/14/21 1242]   BP Temp Temp src Pulse Resp SpO2 Height Weight   (!) 133/100 98.5 °F (36.9 °C) -- 118 18 97 % -- --       Physical Exam  Vitals reviewed. Constitutional:       General: He is not in acute distress. Appearance: Normal appearance.  He is not ill-appearing, toxic-appearing or the following components:    Glucose 113 (*)     Total Bilirubin 1.5 (*)     Alkaline Phosphatase 139 (*)     ALT 78 (*)     AST 95 (*)     All other components within normal limits   CK       All other labs were within normal range or not returned as of this dictation. EMERGENCY DEPARTMENT COURSE and DIFFERENTIAL DIAGNOSIS/MDM:   Vitals:    Vitals:    06/14/21 1242 06/14/21 1401 06/14/21 1501   BP: (!) 133/100 119/89 (!) 123/91   Pulse: 118 80 83   Resp: 18 15 22   Temp: 98.5 °F (36.9 °C)     SpO2: 97% 95% 95%           MDM      REASSESSMENT      No longer nauseated. HR improved with treatment. Voices understanding to tx plan, follow up instructions and strict return parameters    CRITICAL CARE TIME       CONSULTS:  None    PROCEDURES:  Unless otherwise noted below, none     Procedures         FINAL IMPRESSION      1. Allergic reaction, initial encounter          DISPOSITION/PLAN   DISPOSITION Decision To Discharge 06/14/2021 02:52:31 PM      PATIENT REFERRED TO:  Elizabeth Tristan MD  [place] [phone]    Call   As needed      DISCHARGE MEDICATIONS:  Discharge Medication List as of 6/14/2021  3:11 PM      START taking these medications    Details   predniSONE (DELTASONE) 50 MG tablet Take 1 tablet by mouth daily for 5 days, Disp-5 tablet, R-0Normal      sulfamethoxazole-trimethoprim (BACTRIM DS) 800-160 MG per tablet Take 1 tablet by mouth 2 times daily for 10 days, Disp-20 tablet, R-0Normal      cephALEXin (KEFLEX) 500 MG capsule Take 1 capsule by mouth 2 times daily for 10 days, Disp-20 capsule, R-0Normal           Controlled Substances Monitoring:     No flowsheet data found.     (Please note that portions of this note were completed with a voice recognition program.  Efforts were made to edit the dictations but occasionally words are mis-transcribed.)    JUDY Leger - CNP (electronically signed)  Attending Emergency Physician         JUDY Leger - CNP  06/14/21 1931

## 2021-06-16 ENCOUNTER — HOSPITAL ENCOUNTER (EMERGENCY)
Age: 36
Discharge: HOME | End: 2021-06-16
Payer: MEDICAID

## 2021-06-16 ENCOUNTER — APPOINTMENT (OUTPATIENT)
Dept: CT IMAGING | Age: 36
End: 2021-06-16
Payer: MEDICAID

## 2021-06-16 VITALS
WEIGHT: 174.8 LBS | RESPIRATION RATE: 18 BRPM | BODY MASS INDEX: 24.47 KG/M2 | OXYGEN SATURATION: 97 % | DIASTOLIC BLOOD PRESSURE: 96 MMHG | HEIGHT: 71 IN | SYSTOLIC BLOOD PRESSURE: 136 MMHG | TEMPERATURE: 98 F | HEART RATE: 106 BPM

## 2021-06-16 DIAGNOSIS — T63.301D SPIDER BITE WOUND, ACCIDENTAL OR UNINTENTIONAL, SUBSEQUENT ENCOUNTER: Primary | ICD-10-CM

## 2021-06-16 LAB
ALBUMIN SERPL-MCNC: 4.6 G/DL (ref 3.5–5.2)
ALP BLD-CCNC: 130 U/L (ref 40–130)
ALT SERPL-CCNC: 91 U/L (ref 5–41)
ANION GAP SERPL CALCULATED.3IONS-SCNC: 11 MMOL/L (ref 7–19)
AST SERPL-CCNC: 99 U/L (ref 5–40)
BASOPHILS ABSOLUTE: 0 K/UL (ref 0–0.2)
BASOPHILS RELATIVE PERCENT: 0.3 % (ref 0–1)
BILIRUB SERPL-MCNC: 0.5 MG/DL (ref 0.2–1.2)
BUN BLDV-MCNC: 6 MG/DL (ref 6–20)
C-REACTIVE PROTEIN: 0.59 MG/DL (ref 0–0.5)
CALCIUM SERPL-MCNC: 9.3 MG/DL (ref 8.6–10)
CHLORIDE BLD-SCNC: 100 MMOL/L (ref 98–111)
CO2: 25 MMOL/L (ref 22–29)
CREAT SERPL-MCNC: 0.9 MG/DL (ref 0.5–1.2)
EOSINOPHILS ABSOLUTE: 0 K/UL (ref 0–0.6)
EOSINOPHILS RELATIVE PERCENT: 0 % (ref 0–5)
GFR AFRICAN AMERICAN: >59
GFR NON-AFRICAN AMERICAN: >60
GLUCOSE BLD-MCNC: 119 MG/DL (ref 74–109)
HCT VFR BLD CALC: 42.4 % (ref 42–52)
HEMOGLOBIN: 14.3 G/DL (ref 14–18)
IMMATURE GRANULOCYTES #: 0.2 K/UL
LYMPHOCYTES ABSOLUTE: 0.5 K/UL (ref 1.1–4.5)
LYMPHOCYTES RELATIVE PERCENT: 4.3 % (ref 20–40)
MCH RBC QN AUTO: 33.5 PG (ref 27–31)
MCHC RBC AUTO-ENTMCNC: 33.7 G/DL (ref 33–37)
MCV RBC AUTO: 99.3 FL (ref 80–94)
MONOCYTES ABSOLUTE: 0.7 K/UL (ref 0–0.9)
MONOCYTES RELATIVE PERCENT: 6 % (ref 0–10)
NEUTROPHILS ABSOLUTE: 10.4 K/UL (ref 1.5–7.5)
NEUTROPHILS RELATIVE PERCENT: 87.6 % (ref 50–65)
PDW BLD-RTO: 13.3 % (ref 11.5–14.5)
PLATELET # BLD: 255 K/UL (ref 130–400)
PMV BLD AUTO: 10.2 FL (ref 9.4–12.4)
POTASSIUM REFLEX MAGNESIUM: 4.3 MMOL/L (ref 3.5–5)
RBC # BLD: 4.27 M/UL (ref 4.7–6.1)
SEDIMENTATION RATE, ERYTHROCYTE: 1 MM/HR (ref 0–10)
SODIUM BLD-SCNC: 136 MMOL/L (ref 136–145)
TOTAL CK: 121 U/L (ref 39–308)
TOTAL PROTEIN: 7.3 G/DL (ref 6.6–8.7)
WBC # BLD: 11.9 K/UL (ref 4.8–10.8)

## 2021-06-16 PROCEDURE — 80053 COMPREHEN METABOLIC PANEL: CPT

## 2021-06-16 PROCEDURE — 36415 COLL VENOUS BLD VENIPUNCTURE: CPT

## 2021-06-16 PROCEDURE — 85025 COMPLETE CBC W/AUTO DIFF WBC: CPT

## 2021-06-16 PROCEDURE — 85652 RBC SED RATE AUTOMATED: CPT

## 2021-06-16 PROCEDURE — 86140 C-REACTIVE PROTEIN: CPT

## 2021-06-16 PROCEDURE — 82550 ASSAY OF CK (CPK): CPT

## 2021-06-16 PROCEDURE — 6360000004 HC RX CONTRAST MEDICATION: Performed by: PHYSICIAN ASSISTANT

## 2021-06-16 PROCEDURE — 73201 CT UPPER EXTREMITY W/DYE: CPT

## 2021-06-16 PROCEDURE — 99282 EMERGENCY DEPT VISIT SF MDM: CPT

## 2021-06-16 RX ORDER — DIPHENHYDRAMINE HCL 25 MG
25 CAPSULE ORAL EVERY 6 HOURS PRN
Qty: 20 CAPSULE | Refills: 0 | Status: SHIPPED | OUTPATIENT
Start: 2021-06-16 | End: 2021-06-22

## 2021-06-16 RX ADMIN — IOPAMIDOL 90 ML: 755 INJECTION, SOLUTION INTRAVENOUS at 18:02

## 2021-06-16 ASSESSMENT — PAIN SCALES - GENERAL: PAINLEVEL_OUTOF10: 6

## 2021-06-16 ASSESSMENT — PAIN DESCRIPTION - ORIENTATION: ORIENTATION: RIGHT

## 2021-06-16 ASSESSMENT — PAIN DESCRIPTION - PAIN TYPE: TYPE: ACUTE PAIN

## 2021-06-16 ASSESSMENT — PAIN DESCRIPTION - LOCATION: LOCATION: ARM

## 2021-06-16 NOTE — ED PROVIDER NOTES
 Fear of Current or Ex-Partner:     Emotionally Abused:     Physically Abused:     Sexually Abused:        SCREENINGS           PHYSICAL EXAM    (up to 7 forlevel 4, 8 or more for level 5)     ED Triage Vitals [06/16/21 1702]   BP Temp Temp src Pulse Resp SpO2 Height Weight   (!) 136/96 98 °F (36.7 °C) -- 106 18 97 % 5' 10.5\" (1.791 m) 174 lb 12.8 oz (79.3 kg)       Physical Exam  Vitals and nursing note reviewed. Constitutional:       General: He is not in acute distress. Appearance: Normal appearance. He is well-developed. He is not diaphoretic. HENT:      Head: Normocephalic and atraumatic. Right Ear: Tympanic membrane, ear canal and external ear normal.      Left Ear: Tympanic membrane, ear canal and external ear normal.      Nose: Nose normal.      Mouth/Throat:      Mouth: Mucous membranes are moist.   Eyes:      Pupils: Pupils are equal, round, and reactive to light. Neck:      Trachea: No tracheal deviation. Cardiovascular:      Rate and Rhythm: Normal rate and regular rhythm. Pulses: Normal pulses. Heart sounds: Normal heart sounds. No murmur heard. Pulmonary:      Effort: Pulmonary effort is normal.      Breath sounds: Normal breath sounds. No stridor. No wheezing. Chest:      Chest wall: No tenderness. Abdominal:      General: Abdomen is flat. Bowel sounds are normal. There is no distension. Palpations: Abdomen is soft. Tenderness: There is no abdominal tenderness. Musculoskeletal:         General: No signs of injury. Normal range of motion. Cervical back: Normal range of motion and neck supple. Skin:     General: Skin is warm and dry. Capillary Refill: Capillary refill takes less than 2 seconds. Comments: Consistent mild swelling with spider bite medial aspect r bicep   Neurological:      General: No focal deficit present. Mental Status: He is alert and oriented to person, place, and time. Mental status is at baseline. Psychiatric:         Mood and Affect: Mood normal.         Behavior: Behavior normal.         Thought Content: Thought content normal.         Judgment: Judgment normal.           DIAGNOSTIC RESULTS     RADIOLOGY:   Non-plain film images such as CT, Ultrasound and MRI are read by the radiologist. Plain radiographic images are visualized and preliminarilyinterpreted by No att. providers found with the below findings:      Interpretation per the Radiologist below, if available at the time of this note:    CT HUMERUS RIGHT W CONTRAST   Final Result   1. No abnormality is seen. Signed by Dr Glenn Shelton:  Labs Reviewed   C-REACTIVE PROTEIN - Abnormal; Notable for the following components:       Result Value    CRP 0.59 (*)     All other components within normal limits   CBC WITH AUTO DIFFERENTIAL - Abnormal; Notable for the following components:    WBC 11.9 (*)     RBC 4.27 (*)     MCV 99.3 (*)     MCH 33.5 (*)     Neutrophils % 87.6 (*)     Lymphocytes % 4.3 (*)     Neutrophils Absolute 10.4 (*)     Lymphocytes Absolute 0.5 (*)     All other components within normal limits   COMPREHENSIVE METABOLIC PANEL W/ REFLEX TO MG FOR LOW K - Abnormal; Notable for the following components:    Glucose 119 (*)     ALT 91 (*)     AST 99 (*)     All other components within normal limits   SEDIMENTATION RATE   CK       All other labs were within normal range or notreturned as of this dictation.     RE-ASSESSMENT        EMERGENCY DEPARTMENT COURSE and DIFFERENTIAL DIAGNOSIS/MDM:   Vitals:    Vitals:    06/16/21 1702   BP: (!) 136/96   Pulse: 106   Resp: 18   Temp: 98 °F (36.7 °C)   SpO2: 97%   Weight: 174 lb 12.8 oz (79.3 kg)   Height: 5' 10.5\" (1.791 m)       MDM  Feel this is appropriate finding consistent with bug bite as does my attending nothing acute seen on patient's CT mildly elevated blood work and inflammatory markers on appropriate medication plan will be for added onto Benadryl and close follow with Dr. Princess Weir for wound monitoring. Feel appropriate for discharge. PROCEDURES:    Procedures      FINAL IMPRESSION      1.  Spider bite wound, accidental or unintentional, subsequent encounter          DISPOSITION/PLAN   DISPOSITION Decision To Discharge 06/16/2021 08:26:58 PM      PATIENT REFERRED TO:  [facility] EMERGENCY DEPT  [address] [address] [address], DO Jim Joynertiffanie [address] [place] [address] Medical [facility]  [phone]            DISCHARGE MEDICATIONS:  Discharge Medication List as of 6/16/2021  8:40 PM      START taking these medications    Details   diphenhydrAMINE (BENADRYL) 25 MG capsule Take 1 capsule by mouth every 6 hours as needed for Itching, Disp-20 capsule, R-0Normal             (Please note that portions of this note were completed with a voice recognition program.  Efforts were made to edit the dictations but occasionallywords are mis-transcribed.)    Ania Gil[address]  06/17/21 0591

## 2021-06-17 ASSESSMENT — ENCOUNTER SYMPTOMS
EYE DISCHARGE: 0
EYE ITCHING: 0
COLOR CHANGE: 0
APNEA: 0
COUGH: 0
SHORTNESS OF BREATH: 0
BACK PAIN: 0
PHOTOPHOBIA: 0

## 2021-06-17 NOTE — ED NOTES
Pt states he was bit by an unknown insect on right bicep. States it is painful and swelling. Pt arm does appear more swollen than the left and there is an area of gray/brown/red approx jenifer size.       Dalton Chakraborty RN  06/16/21 LEIDA Montes RN  06/16/21 2049

## 2021-06-22 ENCOUNTER — OFFICE VISIT (OUTPATIENT)
Dept: SURGERY | Age: 36
End: 2021-06-22
Payer: MEDICAID

## 2021-06-22 VITALS
BODY MASS INDEX: 27.72 KG/M2 | HEART RATE: 98 BPM | TEMPERATURE: 97.9 F | WEIGHT: 198 LBS | OXYGEN SATURATION: 99 % | HEIGHT: 71 IN

## 2021-06-22 DIAGNOSIS — T63.301A SPIDER BITE WOUND, ACCIDENTAL OR UNINTENTIONAL, INITIAL ENCOUNTER: Primary | ICD-10-CM

## 2021-06-22 PROCEDURE — 99202 OFFICE O/P NEW SF 15 MIN: CPT | Performed by: SURGERY

## 2021-06-22 NOTE — PROGRESS NOTES
Mr. Jignesh Hidalgo is a 29year old male who presents with a complaint of a spider bite. He reports that it happened a week ago. He did not see the spider, but is pretty sure that is what happened. He went to the ER two days later, and was put on keflec, bactrim, and benadryl. He also had a steroid pack. He reports that he thinks the area is looking better, is having some mild pain. History reviewed. No pertinent past medical history. Past Surgical History:   Procedure Laterality Date    CYST REMOVAL Left 2016    Wrist    LYMPHADENECTOMY      Behind left ear    MANDIBLE RECONSTRUCTION  2005    TIBIA FRACTURE SURGERY Right      No current outpatient medications on file. No current facility-administered medications for this visit. Allergies: Pcn [penicillins] and Ibuprofen    History reviewed. No pertinent family history. Social History     Tobacco Use    Smoking status: Current Every Day Smoker     Packs/day: 1.50    Smokeless tobacco: Never Used   Substance Use Topics    Alcohol use: Yes     Comment: nightly whiskey       Review of Systems   Constitutional: Positive for appetite change and fatigue. HENT: Negative for congestion and sore throat. Eyes: Negative for pain and redness. Respiratory: Negative for cough and shortness of breath. Cardiovascular: Negative for chest pain and palpitations. Gastrointestinal: Positive for abdominal pain and nausea. Endocrine: Negative for polydipsia and polyphagia. Genitourinary: Negative for dysuria and hematuria. Musculoskeletal: Positive for myalgias. Negative for arthralgias. Skin: Positive for color change, rash and wound. Neurological: Positive for light-headedness. Negative for dizziness. Psychiatric/Behavioral: Negative for dysphoric mood. The patient is nervous/anxious. Physical Exam  Vitals reviewed. Constitutional:       General: He is not in acute distress. Appearance: Normal appearance.    HENT:      Head: Normocephalic and atraumatic. Eyes:      General: No scleral icterus. Pupils: Pupils are equal, round, and reactive to light. Cardiovascular:      Rate and Rhythm: Normal rate and regular rhythm. Pulmonary:      Effort: Pulmonary effort is normal. No respiratory distress. Abdominal:      General: There is no distension. Palpations: Abdomen is soft. Musculoskeletal:         General: No swelling. Normal range of motion. Cervical back: Neck supple. No rigidity. Skin:     General: Skin is warm and dry. Comments: tiny area about 2 mm of eschar, no surrounding erythema or induration   Neurological:      General: No focal deficit present. Mental Status: He is alert. Mental status is at baseline. Psychiatric:         Mood and Affect: Mood normal.         Behavior: Behavior normal.           Assessment and plan:  29year old male with spider bite to right upper arm  Discussed with the patient that the area looks very well. I recommended that he complete the medications he was given in the ER, and follow up in general surgery as needed, as this does not appear to require any surgical care. Call for any questions, concerns, or appearance of erythema.     Tash Malagon MD  7/7/2021  2:15 PM

## 2021-07-07 ASSESSMENT — ENCOUNTER SYMPTOMS
EYE REDNESS: 0
NAUSEA: 1
COLOR CHANGE: 1
EYE PAIN: 0
SHORTNESS OF BREATH: 0
ABDOMINAL PAIN: 1
COUGH: 0
SORE THROAT: 0

## 2022-09-28 ENCOUNTER — HOSPITAL ENCOUNTER (EMERGENCY)
Age: 37
Discharge: LEFT BEFORE DISCHARGE | End: 2022-09-28

## 2022-09-28 VITALS
WEIGHT: 195 LBS | HEART RATE: 88 BPM | OXYGEN SATURATION: 98 % | HEIGHT: 70 IN | SYSTOLIC BLOOD PRESSURE: 129 MMHG | BODY MASS INDEX: 27.92 KG/M2 | DIASTOLIC BLOOD PRESSURE: 101 MMHG | RESPIRATION RATE: 18 BRPM

## 2022-09-28 ASSESSMENT — PAIN DESCRIPTION - LOCATION: LOCATION: RIB CAGE;FLANK

## 2022-09-28 ASSESSMENT — PAIN SCALES - GENERAL: PAINLEVEL_OUTOF10: 10

## 2022-09-28 ASSESSMENT — PAIN DESCRIPTION - DESCRIPTORS: DESCRIPTORS: SHARP;SHOOTING;THROBBING

## 2022-09-28 ASSESSMENT — PAIN - FUNCTIONAL ASSESSMENT: PAIN_FUNCTIONAL_ASSESSMENT: 0-10

## 2022-09-28 ASSESSMENT — PAIN DESCRIPTION - ORIENTATION: ORIENTATION: RIGHT

## 2022-09-28 ASSESSMENT — PAIN DESCRIPTION - FREQUENCY: FREQUENCY: CONTINUOUS

## 2023-03-22 ENCOUNTER — HOSPITAL ENCOUNTER (INPATIENT)
Facility: HOSPITAL | Age: 38
LOS: 14 days | Discharge: NURSING, REHAB OR LONG-TERM CARE | DRG: 896 | End: 2023-04-05
Admitting: HOSPITALIST
Payer: COMMERCIAL

## 2023-03-22 ENCOUNTER — APPOINTMENT (OUTPATIENT)
Dept: GENERAL RADIOLOGY | Facility: HOSPITAL | Age: 38
DRG: 896 | End: 2023-03-22
Payer: COMMERCIAL

## 2023-03-22 DIAGNOSIS — J96.02 ACUTE RESPIRATORY FAILURE WITH HYPOXIA AND HYPERCAPNIA: ICD-10-CM

## 2023-03-22 DIAGNOSIS — J15.211 STAPHYLOCOCCUS AUREUS PNEUMONIA: Primary | ICD-10-CM

## 2023-03-22 DIAGNOSIS — J96.01 ACUTE RESPIRATORY FAILURE WITH HYPOXIA AND HYPERCAPNIA: ICD-10-CM

## 2023-03-22 PROBLEM — F10.939 ALCOHOL WITHDRAWAL (HCC): Status: ACTIVE | Noted: 2023-03-22

## 2023-03-22 PROBLEM — R74.01 TRANSAMINITIS: Status: ACTIVE | Noted: 2023-03-22

## 2023-03-22 LAB
ALBUMIN SERPL-MCNC: 4.1 G/DL (ref 3.5–5.2)
ALBUMIN/GLOB SERPL: 1.7 G/DL
ALP SERPL-CCNC: 117 U/L (ref 39–117)
ALT SERPL W P-5'-P-CCNC: 66 U/L (ref 1–41)
AMPHET+METHAMPHET UR QL: NEGATIVE
AMPHETAMINES UR QL: NEGATIVE
ANION GAP SERPL CALCULATED.3IONS-SCNC: 10 MMOL/L (ref 5–15)
APAP SERPL-MCNC: <5 MCG/ML (ref 0–30)
AST SERPL-CCNC: 50 U/L (ref 1–40)
BARBITURATES UR QL SCN: POSITIVE
BASOPHILS # BLD AUTO: 0.04 10*3/MM3 (ref 0–0.2)
BASOPHILS NFR BLD AUTO: 0.6 % (ref 0–1.5)
BENZODIAZ UR QL SCN: POSITIVE
BILIRUB SERPL-MCNC: 0.7 MG/DL (ref 0–1.2)
BUN SERPL-MCNC: 5 MG/DL (ref 6–20)
BUN/CREAT SERPL: 6.2 (ref 7–25)
BUPRENORPHINE SERPL-MCNC: NEGATIVE NG/ML
CALCIUM SPEC-SCNC: 8.5 MG/DL (ref 8.6–10.5)
CANNABINOIDS SERPL QL: POSITIVE
CHLORIDE SERPL-SCNC: 99 MMOL/L (ref 98–107)
CO2 SERPL-SCNC: 28 MMOL/L (ref 22–29)
COCAINE UR QL: NEGATIVE
CREAT SERPL-MCNC: 0.81 MG/DL (ref 0.76–1.27)
DEPRECATED RDW RBC AUTO: 49.2 FL (ref 37–54)
EGFRCR SERPLBLD CKD-EPI 2021: 117.2 ML/MIN/1.73
EOSINOPHIL # BLD AUTO: 0.05 10*3/MM3 (ref 0–0.4)
EOSINOPHIL NFR BLD AUTO: 0.8 % (ref 0.3–6.2)
ERYTHROCYTE [DISTWIDTH] IN BLOOD BY AUTOMATED COUNT: 13.8 % (ref 12.3–15.4)
ETHANOL BLD-MCNC: <10 MG/DL (ref 0–10)
ETHANOL UR QL: <0.01 %
GEN 5 2HR TROPONIN T REFLEX: 7 NG/L
GLOBULIN UR ELPH-MCNC: 2.4 GM/DL
GLUCOSE SERPL-MCNC: 93 MG/DL (ref 65–99)
HCT VFR BLD AUTO: 41.4 % (ref 37.5–51)
HGB BLD-MCNC: 14.5 G/DL (ref 13–17.7)
IMM GRANULOCYTES # BLD AUTO: 0.02 10*3/MM3 (ref 0–0.05)
IMM GRANULOCYTES NFR BLD AUTO: 0.3 % (ref 0–0.5)
LYMPHOCYTES # BLD AUTO: 1.1 10*3/MM3 (ref 0.7–3.1)
LYMPHOCYTES NFR BLD AUTO: 16.6 % (ref 19.6–45.3)
MCH RBC QN AUTO: 34.2 PG (ref 26.6–33)
MCHC RBC AUTO-ENTMCNC: 35 G/DL (ref 31.5–35.7)
MCV RBC AUTO: 97.6 FL (ref 79–97)
METHADONE UR QL SCN: NEGATIVE
MONOCYTES # BLD AUTO: 0.72 10*3/MM3 (ref 0.1–0.9)
MONOCYTES NFR BLD AUTO: 10.9 % (ref 5–12)
NEUTROPHILS NFR BLD AUTO: 4.69 10*3/MM3 (ref 1.7–7)
NEUTROPHILS NFR BLD AUTO: 70.8 % (ref 42.7–76)
NRBC BLD AUTO-RTO: 0 /100 WBC (ref 0–0.2)
OPIATES UR QL: NEGATIVE
OXYCODONE UR QL SCN: NEGATIVE
PCP UR QL SCN: NEGATIVE
PLATELET # BLD AUTO: 233 10*3/MM3 (ref 140–450)
PMV BLD AUTO: 9.7 FL (ref 6–12)
POTASSIUM SERPL-SCNC: 3.5 MMOL/L (ref 3.5–5.2)
PROPOXYPH UR QL: NEGATIVE
PROT SERPL-MCNC: 6.5 G/DL (ref 6–8.5)
RBC # BLD AUTO: 4.24 10*6/MM3 (ref 4.14–5.8)
SALICYLATES SERPL-MCNC: <0.3 MG/DL
SODIUM SERPL-SCNC: 137 MMOL/L (ref 136–145)
TRICYCLICS UR QL SCN: NEGATIVE
TROPONIN T DELTA: 0 NG/L
TROPONIN T SERPL HS-MCNC: 7 NG/L
WBC NRBC COR # BLD: 6.62 10*3/MM3 (ref 3.4–10.8)

## 2023-03-22 PROCEDURE — 85025 COMPLETE CBC W/AUTO DIFF WBC: CPT

## 2023-03-22 PROCEDURE — 25010000002 THIAMINE PER 100 MG

## 2023-03-22 PROCEDURE — 82077 ASSAY SPEC XCP UR&BREATH IA: CPT

## 2023-03-22 PROCEDURE — 25010000002 LORAZEPAM PER 2 MG: Performed by: FAMILY MEDICINE

## 2023-03-22 PROCEDURE — 80306 DRUG TEST PRSMV INSTRMNT: CPT

## 2023-03-22 PROCEDURE — 25010000002 PROCHLORPERAZINE 10 MG/2ML SOLUTION

## 2023-03-22 PROCEDURE — 25010000002 PHENOBARBITAL PER 120 MG

## 2023-03-22 PROCEDURE — 80053 COMPREHEN METABOLIC PANEL: CPT

## 2023-03-22 PROCEDURE — 25010000002 ENOXAPARIN PER 10 MG

## 2023-03-22 PROCEDURE — 71045 X-RAY EXAM CHEST 1 VIEW: CPT

## 2023-03-22 PROCEDURE — 80179 DRUG ASSAY SALICYLATE: CPT

## 2023-03-22 PROCEDURE — 93005 ELECTROCARDIOGRAM TRACING: CPT

## 2023-03-22 PROCEDURE — 93010 ELECTROCARDIOGRAM REPORT: CPT | Performed by: INTERNAL MEDICINE

## 2023-03-22 PROCEDURE — 80143 DRUG ASSAY ACETAMINOPHEN: CPT

## 2023-03-22 PROCEDURE — 84484 ASSAY OF TROPONIN QUANT: CPT

## 2023-03-22 RX ORDER — SODIUM CHLORIDE 0.9 % (FLUSH) 0.9 %
10 SYRINGE (ML) INJECTION EVERY 12 HOURS SCHEDULED
Status: DISCONTINUED | OUTPATIENT
Start: 2023-03-22 | End: 2023-04-05 | Stop reason: HOSPADM

## 2023-03-22 RX ORDER — PHENOBARBITAL SODIUM 130 MG/ML
342 INJECTION INTRAMUSCULAR ONCE
Status: COMPLETED | OUTPATIENT
Start: 2023-03-22 | End: 2023-03-22

## 2023-03-22 RX ORDER — NALOXONE HCL 0.4 MG/ML
0.4 VIAL (ML) INJECTION
Status: DISCONTINUED | OUTPATIENT
Start: 2023-03-22 | End: 2023-04-05 | Stop reason: HOSPADM

## 2023-03-22 RX ORDER — PHENOBARBITAL SODIUM 65 MG/ML
65 INJECTION INTRAMUSCULAR EVERY 12 HOURS
Status: COMPLETED | OUTPATIENT
Start: 2023-03-23 | End: 2023-03-23

## 2023-03-22 RX ORDER — PHENOBARBITAL 32.4 MG/1
32.4 TABLET ORAL ONCE
Status: DISCONTINUED | OUTPATIENT
Start: 2023-03-24 | End: 2023-03-22

## 2023-03-22 RX ORDER — PHENOBARBITAL SODIUM 130 MG/ML
260 INJECTION INTRAMUSCULAR
Status: COMPLETED | OUTPATIENT
Start: 2023-03-22 | End: 2023-03-22

## 2023-03-22 RX ORDER — LORAZEPAM 2 MG/ML
2 INJECTION INTRAMUSCULAR
Status: DISPENSED | OUTPATIENT
Start: 2023-03-22 | End: 2023-03-29

## 2023-03-22 RX ORDER — ACETAMINOPHEN 325 MG/1
650 TABLET ORAL EVERY 4 HOURS PRN
Status: DISCONTINUED | OUTPATIENT
Start: 2023-03-22 | End: 2023-04-05 | Stop reason: HOSPADM

## 2023-03-22 RX ORDER — ACETAMINOPHEN 650 MG/1
650 SUPPOSITORY RECTAL EVERY 4 HOURS PRN
Status: DISCONTINUED | OUTPATIENT
Start: 2023-03-22 | End: 2023-04-05 | Stop reason: HOSPADM

## 2023-03-22 RX ORDER — MORPHINE SULFATE 2 MG/ML
2 INJECTION, SOLUTION INTRAMUSCULAR; INTRAVENOUS EVERY 4 HOURS PRN
Status: DISPENSED | OUTPATIENT
Start: 2023-03-22 | End: 2023-04-01

## 2023-03-22 RX ORDER — SODIUM CHLORIDE, SODIUM LACTATE, POTASSIUM CHLORIDE, CALCIUM CHLORIDE 600; 310; 30; 20 MG/100ML; MG/100ML; MG/100ML; MG/100ML
75 INJECTION, SOLUTION INTRAVENOUS CONTINUOUS
Status: DISCONTINUED | OUTPATIENT
Start: 2023-03-22 | End: 2023-04-05 | Stop reason: HOSPADM

## 2023-03-22 RX ORDER — ONDANSETRON 2 MG/ML
4 INJECTION INTRAMUSCULAR; INTRAVENOUS EVERY 6 HOURS PRN
Status: DISCONTINUED | OUTPATIENT
Start: 2023-03-22 | End: 2023-04-05 | Stop reason: HOSPADM

## 2023-03-22 RX ORDER — SODIUM CHLORIDE 9 MG/ML
40 INJECTION, SOLUTION INTRAVENOUS AS NEEDED
Status: DISCONTINUED | OUTPATIENT
Start: 2023-03-22 | End: 2023-04-05 | Stop reason: HOSPADM

## 2023-03-22 RX ORDER — PROCHLORPERAZINE EDISYLATE 5 MG/ML
2.5 INJECTION INTRAMUSCULAR; INTRAVENOUS EVERY 6 HOURS PRN
Status: DISCONTINUED | OUTPATIENT
Start: 2023-03-22 | End: 2023-04-05 | Stop reason: HOSPADM

## 2023-03-22 RX ORDER — LORAZEPAM 2 MG/1
2 TABLET ORAL
Status: ACTIVE | OUTPATIENT
Start: 2023-03-22 | End: 2023-03-29

## 2023-03-22 RX ORDER — PHENOBARBITAL 32.4 MG/1
32.4 TABLET ORAL EVERY 12 HOURS
Status: DISCONTINUED | OUTPATIENT
Start: 2023-03-24 | End: 2023-03-24

## 2023-03-22 RX ORDER — ENOXAPARIN SODIUM 100 MG/ML
40 INJECTION SUBCUTANEOUS DAILY
Status: DISCONTINUED | OUTPATIENT
Start: 2023-03-22 | End: 2023-04-05 | Stop reason: HOSPADM

## 2023-03-22 RX ORDER — LORAZEPAM 0.5 MG/1
1 TABLET ORAL
Status: ACTIVE | OUTPATIENT
Start: 2023-03-22 | End: 2023-03-29

## 2023-03-22 RX ORDER — SODIUM CHLORIDE 0.9 % (FLUSH) 0.9 %
10 SYRINGE (ML) INJECTION AS NEEDED
Status: DISCONTINUED | OUTPATIENT
Start: 2023-03-22 | End: 2023-04-05 | Stop reason: HOSPADM

## 2023-03-22 RX ORDER — ACETAMINOPHEN 160 MG/5ML
650 SOLUTION ORAL EVERY 4 HOURS PRN
Status: DISCONTINUED | OUTPATIENT
Start: 2023-03-22 | End: 2023-03-23 | Stop reason: SDUPTHER

## 2023-03-22 RX ORDER — ONDANSETRON 4 MG/1
4 TABLET, FILM COATED ORAL EVERY 6 HOURS PRN
Status: DISCONTINUED | OUTPATIENT
Start: 2023-03-22 | End: 2023-04-05 | Stop reason: HOSPADM

## 2023-03-22 RX ORDER — LORAZEPAM 2 MG/ML
2 INJECTION INTRAMUSCULAR ONCE
Status: COMPLETED | OUTPATIENT
Start: 2023-03-22 | End: 2023-03-22

## 2023-03-22 RX ORDER — LORAZEPAM 2 MG/ML
2 INJECTION INTRAMUSCULAR
Status: ACTIVE | OUTPATIENT
Start: 2023-03-22 | End: 2023-03-29

## 2023-03-22 RX ORDER — NICOTINE 21 MG/24HR
1 PATCH, TRANSDERMAL 24 HOURS TRANSDERMAL EVERY 24 HOURS
Status: DISCONTINUED | OUTPATIENT
Start: 2023-03-22 | End: 2023-04-05 | Stop reason: HOSPADM

## 2023-03-22 RX ORDER — LORAZEPAM 2 MG/ML
1 INJECTION INTRAMUSCULAR
Status: DISPENSED | OUTPATIENT
Start: 2023-03-22 | End: 2023-03-29

## 2023-03-22 RX ORDER — ATENOLOL 50 MG/1
100 TABLET ORAL DAILY
Status: DISCONTINUED | OUTPATIENT
Start: 2023-03-22 | End: 2023-04-05 | Stop reason: HOSPADM

## 2023-03-22 RX ORDER — PHENOBARBITAL 32.4 MG/1
32.4 TABLET ORAL ONCE
Status: DISCONTINUED | OUTPATIENT
Start: 2023-03-25 | End: 2023-03-22

## 2023-03-22 RX ORDER — PHENOBARBITAL 32.4 MG/1
32.4 TABLET ORAL ONCE
Status: DISCONTINUED | OUTPATIENT
Start: 2023-03-23 | End: 2023-03-22

## 2023-03-22 RX ADMIN — THIAMINE HYDROCHLORIDE 250 MG: 100 INJECTION, SOLUTION INTRAMUSCULAR; INTRAVENOUS at 21:00

## 2023-03-22 RX ADMIN — PHENOBARBITAL SODIUM 260 MG: 130 INJECTION INTRAMUSCULAR at 08:06

## 2023-03-22 RX ADMIN — ENOXAPARIN SODIUM 40 MG: 40 INJECTION SUBCUTANEOUS at 03:47

## 2023-03-22 RX ADMIN — THIAMINE HYDROCHLORIDE 250 MG: 100 INJECTION, SOLUTION INTRAMUSCULAR; INTRAVENOUS at 08:12

## 2023-03-22 RX ADMIN — PHENOBARBITAL SODIUM 260 MG: 130 INJECTION INTRAMUSCULAR at 11:07

## 2023-03-22 RX ADMIN — NICOTINE 1 PATCH: 21 PATCH, EXTENDED RELEASE TRANSDERMAL at 03:47

## 2023-03-22 RX ADMIN — Medication 10 ML: at 21:00

## 2023-03-22 RX ADMIN — SODIUM CHLORIDE, POTASSIUM CHLORIDE, SODIUM LACTATE AND CALCIUM CHLORIDE 150 ML/HR: 600; 310; 30; 20 INJECTION, SOLUTION INTRAVENOUS at 03:47

## 2023-03-22 RX ADMIN — THIAMINE HYDROCHLORIDE 250 MG: 100 INJECTION, SOLUTION INTRAMUSCULAR; INTRAVENOUS at 15:05

## 2023-03-22 RX ADMIN — PROCHLORPERAZINE EDISYLATE 2.5 MG: 5 INJECTION INTRAMUSCULAR; INTRAVENOUS at 03:17

## 2023-03-22 RX ADMIN — SODIUM CHLORIDE, POTASSIUM CHLORIDE, SODIUM LACTATE AND CALCIUM CHLORIDE 150 ML/HR: 600; 310; 30; 20 INJECTION, SOLUTION INTRAVENOUS at 20:23

## 2023-03-22 RX ADMIN — ATENOLOL 100 MG: 50 TABLET ORAL at 08:09

## 2023-03-22 RX ADMIN — PHENOBARBITAL SODIUM 342 MG: 130 INJECTION INTRAMUSCULAR; INTRAVENOUS at 05:16

## 2023-03-22 RX ADMIN — LORAZEPAM 2 MG: 2 INJECTION INTRAMUSCULAR; INTRAVENOUS at 20:23

## 2023-03-22 NOTE — PAYOR COMM NOTE
"  [facility]  Case Managment Extender   Tracey Hay  (P) [phone]  (F) [phone]      [employer] Provider ID# [id number]      Juan A Sky \"Ti\" (37 y.o. Male)     Date of Birth   1985    Social Security Number       Address   [facility] [place] [address]    Home Phone   [phone]    MRN   [id number]       Evangelical   Roman [employer]    Marital Status                               Admission Date   3/22/23    Admission Type   Urgent    Admitting Provider   Harshal Jackson MD    Attending Provider   Harshal Jackson MD    Department, Room/Bed   [address], 411/1       Discharge Date       Discharge Disposition       Discharge Destination                               Attending Provider: Harshal Jackson MD    Allergies: Penicillins, Ibuprofen    Isolation: None   Infection: None   Code Status: CPR    Ht: --   Wt: 85.4 kg (188 lb 3.2 oz)    Admission Cmt: None   Principal Problem: Alcohol withdrawal (HCC) [F10.939]                 Active Insurance as of 3/22/2023     Primary Coverage     Payor Plan Insurance Group Employer/Plan Group    [employer] [place] [employer]      Payor Plan Address Payor Plan Phone Number Payor Plan Fax Number Effective Dates    [address] [address] [phone]  3/21/2023 - None Entered    [facility] [id number]       Subscriber Name Subscriber Birth Date Member ID       JUAN A SKY 1985 [id number]                 Emergency Contacts      (Rel.) Home Phone Work Phone Mobile Phone    CHRISSIE SKY (Spouse) -- -- [phone]               History & Physical      Harshal Jackson MD at 03/22/23 [facility]                [facility] Medicine Admission      Date of Admission: 3/22/2023      Primary Care Physician: Brandon Darling MD    Chief Complaint:   Withdrawal    HPI:  This patient is a transfer from [facility]  Patient tells " me that he drank a large amount of alcohol on Monday night and subsequently woke up Tuesday morning feeling very unwell.  He started having nausea and vomiting associated with a headache and one episode of syncope that lasted approximately 2 seconds that was a result of him vomiting so hard and excessively.  He then started develop rapid breathing which brought on episodes of facial numbness and cramping in his hands (I suspect that he was hyperventilating as he says that his wife told him to calm down and controlled his breathing).  Later in the day he then started developing tremors and became quite anxious/nervous and was concerned that he was having a stroke or seizure.  He then started developing some shortness of breath, and aching in his chest from the vomiting, and palpitations.  Patient denies any other systemic symptoms on review.      Patient smokes marijuana on occasion but does not need to use any other drugs.    Patient's blood work on admission is remarkable for calcium of 8.5, ALT 66, and AST 50.    Concurrent Medical History:  has a past medical history of Cat scratch fever, Depression, Ganglion cyst of wrist, left, Hypertension, Mitral valve prolapse, Mitral valve prolapse, Substance abuse (HCC), and Type 2 HSV infection of penis.    Past Surgical History:  has a past surgical history that includes Lymphadenectomy; Ankle surgery (Right); Mandible fracture surgery; and Cyst Removal (Left).    Family History: family history includes No Known Problems in his father and mother.     Social History:  reports that he has been smoking cigarettes. He has a 40.00 pack-year smoking history. He has never used smokeless tobacco. He reports current alcohol use of about 1.0 standard drink per week. He reports current drug use. Frequency: 28.00 times per week. Drug: Marijuana.    Allergies:   Allergies   Allergen Reactions   • Penicillins Anaphylaxis   • Ibuprofen Nausea And Vomiting, Provider Review Needed and  "Other (See Comments)     Intolerance per paper chart from Dr Darling's private practice. Has taken so much of it over the years, he gets abdominal cramps and pain.   GI concerns       Medications:   Prior to Admission medications    Medication Sig Start Date End Date Taking? Authorizing Provider   atenolol (TENORMIN) 100 MG tablet Take 1 tablet by mouth Daily. 4/9/19   Jessie Jackson, KINGSLEY       Review of Systems:  Review of Systems   Constitutional: Positive for activity change, appetite change and diaphoresis. Negative for fever.   HENT: Negative for congestion, ear pain, rhinorrhea, sinus pressure, sneezing and sore throat.    Eyes: Negative for photophobia and visual disturbance.   Respiratory: Positive for shortness of breath. Negative for cough, choking, wheezing and stridor.    Cardiovascular: Positive for chest pain (\"when he \"doesn't have alcohol at home\") and palpitations. Negative for leg swelling.   Gastrointestinal: Positive for nausea (yesterday morning) and vomiting (can't keep any fluids down). Negative for abdominal distention, abdominal pain, constipation and diarrhea.   Genitourinary: Positive for decreased urine volume. Negative for difficulty urinating, dysuria, frequency, hematuria and urgency.   Musculoskeletal: Negative for arthralgias, joint swelling and myalgias.   Skin: Negative for color change, rash and wound.   Neurological: Positive for tremors (full body), syncope (\"couple of seconds\" because \"puking so hard\"), numbness (both arms went numb whilst tremors were in full force and hand started cramping) and headaches. Negative for seizures, facial asymmetry and weakness.        Independence face was asleep, thought he was \"going to black out\", hands starting cramping up   Psychiatric/Behavioral: Positive for agitation (\"worried having a seizure\"). Negative for confusion and hallucinations. The patient is nervous/anxious.       Otherwise complete ROS is negative except as mentioned " above.    Physical Exam:   Temp:  [98.9 °F (37.2 °C)-99.5 °F (37.5 °C)] 98.9 °F (37.2 °C)  Heart Rate:  [75-89] 75  Resp:  [20] 20  BP: (136-142)/(82-89) 142/82  Physical Exam  Vitals and nursing note reviewed.   Constitutional:       General: He is awake. He is not in acute distress.     Appearance: He is ill-appearing. He is not toxic-appearing or diaphoretic.   HENT:      Head: Normocephalic and atraumatic.      Nose: Nose normal. No congestion.      Mouth/Throat:      Mouth: Mucous membranes are dry.      Pharynx: Oropharynx is clear. No oropharyngeal exudate or posterior oropharyngeal erythema.   Eyes:      General: No visual field deficit.        Right eye: No discharge.         Left eye: No discharge.      Extraocular Movements: Extraocular movements intact.      Conjunctiva/sclera: Conjunctivae normal.      Pupils: Pupils are equal, round, and reactive to light.   Cardiovascular:      Rate and Rhythm: Normal rate and regular rhythm.      Pulses: Normal pulses.      Heart sounds: Normal heart sounds. No murmur heard.    No friction rub. No gallop.   Pulmonary:      Effort: Pulmonary effort is normal. No respiratory distress.      Breath sounds: Normal breath sounds. No stridor. No wheezing, rhonchi or rales.   Abdominal:      General: Abdomen is flat. There is no distension.      Palpations: Abdomen is soft. There is no mass.      Tenderness: There is no abdominal tenderness.      Hernia: No hernia is present.   Musculoskeletal:         General: No swelling.      Right lower leg: No edema.      Left lower leg: No edema.   Skin:     Capillary Refill: Capillary refill takes less than 2 seconds.      Coloration: Skin is not jaundiced.      Findings: No bruising or rash.   Neurological:      General: No focal deficit present.      Mental Status: He is alert and oriented to person, place, and time. Mental status is at baseline.      GCS: GCS eye subscore is 4. GCS verbal subscore is 5. GCS motor subscore is 6.       Cranial Nerves: No cranial nerve deficit, dysarthria or facial asymmetry.      Sensory: Sensation is intact. No sensory deficit.      Motor: No weakness, tremor, abnormal muscle tone or seizure activity.   Psychiatric:         Behavior: Behavior normal. Behavior is cooperative.         Thought Content: Thought content normal.         Judgment: Judgment normal.         Results Reviewed:  I have personally reviewed current lab, radiology, and data and agree with results.  Lab Results (last 24 hours)     Procedure Component Value Units Date/Time    Comprehensive Metabolic Panel [[id number]]  (Abnormal) Collected: 03/22/23 0345    Specimen: Blood Updated: 03/22/23 0415     Glucose 93 mg/dL      BUN 5 mg/dL      Creatinine 0.81 mg/dL      Sodium 137 mmol/L      Potassium 3.5 mmol/L      Chloride 99 mmol/L      CO2 28.0 mmol/L      Calcium 8.5 mg/dL      Total Protein 6.5 g/dL      Albumin 4.1 g/dL      ALT (SGPT) 66 U/L      AST (SGOT) 50 U/L      Alkaline Phosphatase 117 U/L      Total Bilirubin 0.7 mg/dL      Globulin 2.4 gm/dL      A/G Ratio 1.7 g/dL      BUN/Creatinine Ratio 6.2     Anion Gap 10.0 mmol/L      eGFR 117.2 mL/min/1.73     Narrative:      GFR Normal >60  Chronic Kidney Disease <60  Kidney Failure <15      Ethanol [[id number]] Collected: 03/22/23 0345    Specimen: Blood Updated: 03/22/23 0415     Ethanol <10 mg/dL      Ethanol % <0.010 %     Acetaminophen Level [[id number]]  (Normal) Collected: 03/22/23 0345    Specimen: Blood Updated: 03/22/23 0415     Acetaminophen <5.0 mcg/mL     Salicylate Level [[id number]]  (Normal) Collected: 03/22/23 0345    Specimen: Blood Updated: 03/22/23 0415     Salicylate <0.3 mg/dL     CBC & Differential [[id number]]  (Abnormal) Collected: 03/22/23 0345    Specimen: Blood Updated: 03/22/23 0358    Narrative:      The following orders were created for panel order CBC & Differential.  Procedure                               Abnormality         Status                     ---------                                -----------         ------                     CBC Auto Differential[[id number]]        Abnormal            Final result                 Please view results for these tests on the individual orders.    CBC Auto Differential [[id number]]  (Abnormal) Collected: 03/22/23 0345    Specimen: Blood Updated: 03/22/23 0358     WBC 6.62 10*3/mm3      RBC 4.24 10*6/mm3      Hemoglobin 14.5 g/dL      Hematocrit 41.4 %      MCV 97.6 fL      MCH 34.2 pg      MCHC 35.0 g/dL      RDW 13.8 %      RDW-SD 49.2 fl      MPV 9.7 fL      Platelets 233 10*3/mm3      Neutrophil % 70.8 %      Lymphocyte % 16.6 %      Monocyte % 10.9 %      Eosinophil % 0.8 %      Basophil % 0.6 %      Immature Grans % 0.3 %      Neutrophils, Absolute 4.69 10*3/mm3      Lymphocytes, Absolute 1.10 10*3/mm3      Monocytes, Absolute 0.72 10*3/mm3      Eosinophils, Absolute 0.05 10*3/mm3      Basophils, Absolute 0.04 10*3/mm3      Immature Grans, Absolute 0.02 10*3/mm3      nRBC 0.0 /100 WBC         Imaging Results (Last 24 Hours)     ** No results found for the last 24 hours. **            Assessment:    Active Hospital Problems    Diagnosis    • **Alcohol withdrawal (HCC)    • Transaminitis          Medical Decision Making  Number and Complexity of problems: 2 - High to Low    Differential Diagnosis:   As above  ACS  Focal seizure    Conditions and Status:        Condition is improving.     [facility] Data  External documents reviewed: Care everywhere ([facility] clinical summary/documents and paperwork with patient)  My EKG interpretation: *Awaited  My plain film interpretation: *Awaited  Tests considered but not ordered: Nil     Decision rules/scores evaluated (example [id number], Wells, etc):   STACIWA     Discussed with: The patient     Treatment Plan  1.  Stat CBC and CMP with ethanol level, acetaminophen, and salicylates  2.  Chest x-ray  3.  ECG   4.  Home medication rec patient  5.  VTE prophylaxis  6.  Alcohol withdrawal  protocol with IV phenobarbital  7.  High-dose thiamine      Care Planning  Shared decision making: With the patient  Code status and discussions: Full code    Disposition  Social Determinants of Health that impact treatment or disposition: Nil  I expect the patient to be discharged to home in 3-5 days.       Harshal Jackson MD    Electronically signed by Harshal Jackson MD, 03/22/23, 6:51 AM CDT.                Electronically signed by Harshal Jackson MD at 03/22/23 0656       Emergency Department Notes    No notes of this type exist for this encounter.           Lab Results (last 24 hours)     Procedure Component Value Units Date/Time    Urine Drug Screen - Urine, Clean Catch [[id number]]  (Abnormal) Collected: 03/22/23 0843    Specimen: Urine, Clean Catch Updated: 03/22/23 0856     THC, Screen, Urine Positive     Phencyclidine (PCP), Urine Negative     Cocaine Screen, Urine Negative     Methamphetamine, Ur Negative     Opiate Screen Negative     Amphetamine Screen, Urine Negative     Benzodiazepine Screen, Urine Positive     Tricyclic Antidepressants Screen Negative     Methadone Screen, Urine Negative     Barbiturates Screen, Urine Positive     Oxycodone Screen, Urine Negative     Propoxyphene Screen Negative     Buprenorphine, Screen, Urine Negative    Narrative:      Cutoff For Drugs Screened:    Amphetamines               500 ng/ml  Barbiturates               200 ng/ml  Benzodiazepines            150 ng/ml  Cocaine                    150 ng/ml  Methadone                  200 ng/ml  Opiates                    100 ng/ml  Phencyclidine               25 ng/ml  THC                            50 ng/ml  Methamphetamine            500 ng/ml  Tricyclic Antidepressants  300 ng/ml  Oxycodone                  100 ng/ml  Propoxyphene               300 ng/ml  Buprenorphine               10 ng/ml    The normal value for all drugs tested is negative. This report includes unconfirmed screening results, with the cutoff  values listed, to be used for medical treatment purposes only.  Unconfirmed results must not be used for non-medical purposes such as employment or legal testing.  Clinical consideration should be applied to any drug of abuse test, particularly when unconfirmed results are used.      High Sensitivity Troponin T 2Hr [[id number]]  (Normal) Collected: 03/22/23 0726    Specimen: Blood Updated: 03/22/23 0757     HS Troponin T 7 ng/L      Troponin T Delta 0 ng/L     Narrative:      High Sensitive Troponin T Reference Range:  <10.0 ng/L- Negative Female for AMI  <15.0 ng/L- Negative Male for AMI  >=10 - Abnormal Female indicating possible myocardial injury.  >=15 - Abnormal Male indicating possible myocardial injury.   Clinicians would have to utilize clinical acumen, EKG, Troponin, and serial changes to determine if it is an Acute Myocardial Infarction or myocardial injury due to an underlying chronic condition.         High Sensitivity Troponin T [[id number]]  (Normal) Collected: 03/22/23 0345    Specimen: Blood Updated: 03/22/23 0719     HS Troponin T 7 ng/L     Narrative:      High Sensitive Troponin T Reference Range:  <10.0 ng/L- Negative Female for AMI  <15.0 ng/L- Negative Male for AMI  >=10 - Abnormal Female indicating possible myocardial injury.  >=15 - Abnormal Male indicating possible myocardial injury.   Clinicians would have to utilize clinical acumen, EKG, Troponin, and serial changes to determine if it is an Acute Myocardial Infarction or myocardial injury due to an underlying chronic condition.         Comprehensive Metabolic Panel [[id number]]  (Abnormal) Collected: 03/22/23 0345    Specimen: Blood Updated: 03/22/23 0415     Glucose 93 mg/dL      BUN 5 mg/dL      Creatinine 0.81 mg/dL      Sodium 137 mmol/L      Potassium 3.5 mmol/L      Chloride 99 mmol/L      CO2 28.0 mmol/L      Calcium 8.5 mg/dL      Total Protein 6.5 g/dL      Albumin 4.1 g/dL      ALT (SGPT) 66 U/L      AST (SGOT) 50 U/L      Alkaline  Phosphatase 117 U/L      Total Bilirubin 0.7 mg/dL      Globulin 2.4 gm/dL      A/G Ratio 1.7 g/dL      BUN/Creatinine Ratio 6.2     Anion Gap 10.0 mmol/L      eGFR 117.2 mL/min/1.73     Narrative:      GFR Normal >60  Chronic Kidney Disease <60  Kidney Failure <15      Ethanol [[id number]] Collected: 03/22/23 0345    Specimen: Blood Updated: 03/22/23 0415     Ethanol <10 mg/dL      Ethanol % <0.010 %     Acetaminophen Level [[id number]]  (Normal) Collected: 03/22/23 0345    Specimen: Blood Updated: 03/22/23 0415     Acetaminophen <5.0 mcg/mL     Salicylate Level [[id number]]  (Normal) Collected: 03/22/23 0345    Specimen: Blood Updated: 03/22/23 0415     Salicylate <0.3 mg/dL     CBC & Differential [[id number]]  (Abnormal) Collected: 03/22/23 0345    Specimen: Blood Updated: 03/22/23 0358    Narrative:      The following orders were created for panel order CBC & Differential.  Procedure                               Abnormality         Status                     ---------                               -----------         ------                     CBC Auto Differential[[id number]]        Abnormal            Final result                 Please view results for these tests on the individual orders.    CBC Auto Differential [[id number]]  (Abnormal) Collected: 03/22/23 0345    Specimen: Blood Updated: 03/22/23 0358     WBC 6.62 10*3/mm3      RBC 4.24 10*6/mm3      Hemoglobin 14.5 g/dL      Hematocrit 41.4 %      MCV 97.6 fL      MCH 34.2 pg      MCHC 35.0 g/dL      RDW 13.8 %      RDW-SD 49.2 fl      MPV 9.7 fL      Platelets 233 10*3/mm3      Neutrophil % 70.8 %      Lymphocyte % 16.6 %      Monocyte % 10.9 %      Eosinophil % 0.8 %      Basophil % 0.6 %      Immature Grans % 0.3 %      Neutrophils, Absolute 4.69 10*3/mm3      Lymphocytes, Absolute 1.10 10*3/mm3      Monocytes, Absolute 0.72 10*3/mm3      Eosinophils, Absolute 0.05 10*3/mm3      Basophils, Absolute 0.04 10*3/mm3      Immature Grans, Absolute 0.02 10*3/mm3       nRBC 0.0 /100 WBC         Imaging Results (Last 24 Hours)     Procedure Component Value Units Date/Time    XR Chest 1 View [[id number]] Resulted: 03/22/23 0722     Updated: 03/22/23 0731        ECG/EMG Results (last 24 hours)     Procedure Component Value Units Date/Time    ECG 12 Lead Dyspnea [[id number]] Collected: 03/22/23 0648     Updated: 03/22/23 0659     QT Interval 386 ms      QTC Interval 453 ms     Narrative:      Test Reason : Dyspnea  Blood Pressure :   */*   mmHG  Vent. Rate :  83 BPM     Atrial Rate :  83 BPM     P-R Int : 160 ms          QRS Dur : 100 ms      QT Int : 386 ms       P-R-T Axes :  41   6  30 degrees     QTc Int : 453 ms    Normal sinus rhythm  Normal ECG  No previous ECGs available    Referred By:            Confirmed By:           Physician Progress Notes (last 24 hours)  Notes from 03/21/23 0908 through 03/22/23 0908   No notes of this type exist for this encounter.         Medical Student Notes (last 24 hours)  Notes from 03/21/23 0908 through 03/22/23 0908   No notes of this type exist for this encounter.         Consult Notes (last 24 hours)  Notes from 03/21/23 0908 through 03/22/23 0908   No notes of this type exist for this encounter.

## 2023-03-22 NOTE — PLAN OF CARE
Goal Outcome Evaluation:  Plan of Care Reviewed With: patient        Progress: no change  Outcome Evaluation: New admit. IV fluids initiated. PRN antiemetic given. Fall precaution in place. Seizure precautions in place. CIWA initiated.

## 2023-03-22 NOTE — PROGRESS NOTES
"    [facility] Medicine   INPATIENT PROGRESS NOTE      Patient Name: Jackson Acuña  Date of Admission: 3/22/2023  Today's Date: 03/22/23  Length of Stay: 0  Primary Care Physician: Brandon Darling MD    Subjective   Chief Complaint: \"bad acid reflux\"  HPI   Reports his symptoms have improved. Denies tremors or anxiety, c/o reflux.        Review of Systems     All pertinent negatives and positives are as above. All other systems have been reviewed and are negative unless otherwise stated.     Objective    Temp:  [97 °F (36.1 °C)-99.5 °F (37.5 °C)] 97 °F (36.1 °C)  Heart Rate:  [71-89] 78  Resp:  [18-20] 18  BP: (123-142)/(82-98) 125/85  Physical Exam  Vitals reviewed.   HENT:      Head: Normocephalic.      Mouth/Throat:      Mouth: Mucous membranes are moist.      Pharynx: Oropharynx is clear.   Eyes:      Conjunctiva/sclera: Conjunctivae normal.      Pupils: Pupils are equal, round, and reactive to light.   Cardiovascular:      Rate and Rhythm: Normal rate and regular rhythm.      Pulses: Normal pulses.   Pulmonary:      Effort: Pulmonary effort is normal.      Breath sounds: Normal breath sounds.   Abdominal:      General: Abdomen is flat. Bowel sounds are normal.      Palpations: Abdomen is soft.   Musculoskeletal:         General: Normal range of motion.      Cervical back: Normal range of motion.   Skin:     General: Skin is warm and dry.      Capillary Refill: Capillary refill takes less than 2 seconds.   Neurological:      General: No focal deficit present.      Mental Status: He is alert. Mental status is at baseline.   Psychiatric:         Mood and Affect: Mood normal.           Results Review:  I have reviewed the labs, radiology results, and diagnostic studies.    Laboratory Data:   Results from last 7 days   Lab Units 03/22/23  0345   WBC 10*3/mm3 6.62   HEMOGLOBIN g/dL 14.5   HEMATOCRIT % 41.4   PLATELETS 10*3/mm3 233        Results from last 7 days "   Lab Units 03/22/23  0345   SODIUM mmol/L 137   POTASSIUM mmol/L 3.5   CHLORIDE mmol/L 99   CO2 mmol/L 28.0   BUN mg/dL 5*   CREATININE mg/dL 0.81   CALCIUM mg/dL 8.5*   BILIRUBIN mg/dL 0.7   ALK PHOS U/L 117   ALT (SGPT) U/L 66*   AST (SGOT) U/L 50*   GLUCOSE mg/dL 93       Culture Data:   No results found for: BLOODCX  No results found for: URINECX  No results found for: RESPCX  No results found for: WOUNDCX  No results found for: STOOLCX  No components found for: BODYFLD    Radiology Data:   Imaging Results (Last 24 Hours)     Procedure Component Value Units Date/Time    XR Chest 1 View [[id number]] Resulted: 03/22/23 0722     Updated: 03/22/23 0731          I have reviewed the patient's current medications.     Assessment/Plan     Active Hospital Problems    Diagnosis    • **Alcohol withdrawal (HCC)    • Transaminitis        Plan  1. Alcohol withdrawal  -continue withdrawal protocol, vitamin supplementation                Discharge Planning: I expect the patient to be discharged to  in  days.    Electronically signed by KINGSLEY Almeida, 03/22/23, 15:36 CDT.

## 2023-03-22 NOTE — PLAN OF CARE
Problem: Adult Inpatient Plan of Care  Goal: Plan of Care Review  Outcome: Ongoing, Progressing  Flowsheets (Taken 3/22/2023 1617)  Plan of Care Reviewed With: patient   Goal Outcome Evaluation:  Plan of Care Reviewed With: patient           N/V and possible seizure r/t alcohol w/d- noted hx of alcohol and substance abuse. Reg diet- 50% x1 meal. # w/ BMI 26.9.  Pt reports he has no teeth and requests meets to be chopped. He reports fair appetite although admits he often only eats dinner d/t alcohol use. He states -205#. Unable to eat lunch d/t nasuea and small of foods make him gag. Discussed foods available vs preferences and importance of good nutrition, encouraged snacks. Will change to soft/chopped meats per request. RD to follow.

## 2023-03-22 NOTE — CONSULTS
Adult Nutrition  Assessment/PES    Patient Name:  Jackson Acuña  YOB: 1985  MRN: [id number]  Admit Date:  3/22/2023    Assessment Date:  3/22/2023    Comments:  36yo male admit w/ N/V and possible seizure r/t alcohol w/d- noted hx of alcohol and substance abuse. Reg diet- 50% x1 meal. # w/ BMI 26.9.  Pt reports he has no teeth and requests meets to be chopped. He reports fair appetite although admits he often only eats dinner d/t alcohol use. He states -205#. Unable to eat lunch d/t nasuea and small of foods make him gag. Discussed foods available vs preferences and importance of good nutrition, encouraged snacks. Will change to soft/chopped meats per request. RD to follow.     Reason for Assessment     Row Name 03/22/23 1602          Reason for Assessment    Reason For Assessment nurse/nurse practitioner consult     Diagnosis substance use/abuse     Identified At Risk by Screening Criteria MST SCORE 2+;difficulty chewing/swallowing                Nutrition/Diet History     Row Name 03/22/23 1606          Nutrition/Diet History    Typical Intake (Food/Fluid/EN/PN) Pt states nkfa. He reports he has no teeth and requests meets to be chopped. He reports fair appetite although admits he often only eats dinner d/t alcohol use. He states -205#. Unable to eat lunch d/t nasuea and small of foods make him gag.     Food Preferences likes turkey and cheese sand w/ baltazar                Labs/Tests/Procedures/Meds     Row Name 03/22/23 1608          Labs/Procedures/Meds    Lab Results Reviewed reviewed     Lab Results Comments Glu 93, alb 4.1        Diagnostic Tests/Procedures    Diagnostic Test/Procedure Reviewed reviewed        Medications    Pertinent Medications Reviewed reviewed     Pertinent Medications Comments IV thiamine, CIWA, LR                  Estimated/Assessed Needs - Anthropometrics     Row Name 03/22/23 1610          Anthropometrics    Weight for Calculation 85.3 kg (188 lb)         Estimated/Assessed Needs    Additional Documentation Fluid Requirements (Group);Estimated Calorie Needs (Group);KCAL/KG (Group);Protein Requirements (Group)        Estimated Calorie Needs    Estimated Calorie Requirement (kcal/day) 2100        KCAL/KG    KCAL/KG 25 Kcal/Kg (kcal)     25 Kcal/Kg (kcal) 2131.9        Protein Requirements    Weight Used For Protein Calculations 85.3 kg (188 lb)     Est Protein Requirement Amount (gms/kg) 1.0 gm protein     Estimated Protein Requirements (gms/day) 85.28        Fluid Requirements    Fluid Requirements (mL/day) 2100     RDA Method (mL) 2100                Nutrition Prescription Ordered     Row Name 03/22/23 1613          Nutrition Prescription PO    Current PO Diet Regular                Evaluation of Received Nutrient/Fluid Intake     Row Name 03/22/23 1614          PO Evaluation    Number of Meals 1     % PO Intake 50                   Problem/Interventions:   Problem 1     Row Name 03/22/23 1614          Nutrition Diagnoses Problem 1    Problem 1 Predicted Suboptimal Intake     Etiology (related to) Medical Diagnosis;Factors Affecting Nutrition     Substance Use ETOH;Drug/illicit/recreational     Reported/Observed By Patient     Oral Chewing Difficulty     Reported GI Symptoms N & V                      Intervention Goal     Row Name 03/22/23 1614          Intervention Goal    General Maintain nutrition;Reduce/improve symptoms;Meet nutritional needs for age/condition;Provide information regarding MNT for treatment/condition     PO Establish PO;Meet estimated needs     Weight Maintain weight                Nutrition Intervention     Row Name 03/22/23 1614          Nutrition Intervention    RD/Tech Action Interview for preference;Encourage intake;Care plan reviewd;Follow Tx progress;Recommend/ordered     Recommended/Ordered Diet                Nutrition Prescription     Row Name 03/22/23 1615          Nutrition Prescription PO    PO Prescription Begin/change diet      Begin/Change Diet to Soft Texture     Texture Ground     New PO Prescription Ordered? Yes                Education/Evaluation     Row Name 03/22/23 1615          Education    Education Education topics;Advised regarding habits/behavior     Education Topics Basic nutrition     Advised Regarding Habits/Behavior Food choices;Snacks        Monitor/Evaluation    Monitor PO intake;Pertinent labs;Weight;Symptoms     Education Follow-up Reinforce PRN                 Electronically signed by:  Verito Clay RD  03/22/23 16:15 CDT

## 2023-03-22 NOTE — H&P
[facility] Medicine Admission      Date of Admission: 3/22/2023      Primary Care Physician: Brandon Darling MD    Chief Complaint:   Withdrawal    HPI:  This patient is a transfer from [facility]  Patient tells me that he drank a large amount of alcohol on Monday night and subsequently woke up Tuesday morning feeling very unwell.  He started having nausea and vomiting associated with a headache and one episode of syncope that lasted approximately 2 seconds that was a result of him vomiting so hard and excessively.  He then started develop rapid breathing which brought on episodes of facial numbness and cramping in his hands (I suspect that he was hyperventilating as he says that his wife told him to calm down and controlled his breathing).  Later in the day he then started developing tremors and became quite anxious/nervous and was concerned that he was having a stroke or seizure.  He then started developing some shortness of breath, and aching in his chest from the vomiting, and palpitations.  Patient denies any other systemic symptoms on review.      Patient smokes marijuana on occasion but does not need to use any other drugs.    Patient's blood work on admission is remarkable for calcium of 8.5, ALT 66, and AST 50.    Concurrent Medical History:  has a past medical history of Cat scratch fever, Depression, Ganglion cyst of wrist, left, Hypertension, Mitral valve prolapse, Mitral valve prolapse, Substance abuse (HCC), and Type 2 HSV infection of penis.    Past Surgical History:  has a past surgical history that includes Lymphadenectomy; Ankle surgery (Right); Mandible fracture surgery; and Cyst Removal (Left).    Family History: family history includes No Known Problems in his father and mother.     Social History:  reports that he has been smoking cigarettes. He has a 40.00 pack-year smoking history. He has never used smokeless tobacco. He reports current  "alcohol use of about 1.0 standard drink per week. He reports current drug use. Frequency: 28.00 times per week. Drug: Marijuana.    Allergies:   Allergies   Allergen Reactions   • Penicillins Anaphylaxis   • Ibuprofen Nausea And Vomiting, Provider Review Needed and Other (See Comments)     Intolerance per paper chart from Dr Darling's private practice. Has taken so much of it over the years, he gets abdominal cramps and pain.   GI concerns       Medications:   Prior to Admission medications    Medication Sig Start Date End Date Taking? Authorizing Provider   atenolol (TENORMIN) 100 MG tablet Take 1 tablet by mouth Daily. 4/9/19   Jessie Jackson, KINGSLEY       Review of Systems:  Review of Systems   Constitutional: Positive for activity change, appetite change and diaphoresis. Negative for fever.   HENT: Negative for congestion, ear pain, rhinorrhea, sinus pressure, sneezing and sore throat.    Eyes: Negative for photophobia and visual disturbance.   Respiratory: Positive for shortness of breath. Negative for cough, choking, wheezing and stridor.    Cardiovascular: Positive for chest pain (\"when he \"doesn't have alcohol at home\") and palpitations. Negative for leg swelling.   Gastrointestinal: Positive for nausea (yesterday morning) and vomiting (can't keep any fluids down). Negative for abdominal distention, abdominal pain, constipation and diarrhea.   Genitourinary: Positive for decreased urine volume. Negative for difficulty urinating, dysuria, frequency, hematuria and urgency.   Musculoskeletal: Negative for arthralgias, joint swelling and myalgias.   Skin: Negative for color change, rash and wound.   Neurological: Positive for tremors (full body), syncope (\"couple of seconds\" because \"puking so hard\"), numbness (both arms went numb whilst tremors were in full force and hand started cramping) and headaches. Negative for seizures, facial asymmetry and weakness.        [place] face was asleep, thought he was \"going " "to black out\", hands starting cramping up   Psychiatric/Behavioral: Positive for agitation (\"worried having a seizure\"). Negative for confusion and hallucinations. The patient is nervous/anxious.       Otherwise complete ROS is negative except as mentioned above.    Physical Exam:   Temp:  [98.9 °F (37.2 °C)-99.5 °F (37.5 °C)] 98.9 °F (37.2 °C)  Heart Rate:  [75-89] 75  Resp:  [20] 20  BP: (136-142)/(82-89) 142/82  Physical Exam  Vitals and nursing note reviewed.   Constitutional:       General: He is awake. He is not in acute distress.     Appearance: He is ill-appearing. He is not toxic-appearing or diaphoretic.   HENT:      Head: Normocephalic and atraumatic.      Nose: Nose normal. No congestion.      Mouth/Throat:      Mouth: Mucous membranes are dry.      Pharynx: Oropharynx is clear. No oropharyngeal exudate or posterior oropharyngeal erythema.   Eyes:      General: No visual field deficit.        Right eye: No discharge.         Left eye: No discharge.      Extraocular Movements: Extraocular movements intact.      Conjunctiva/sclera: Conjunctivae normal.      Pupils: Pupils are equal, round, and reactive to light.   Cardiovascular:      Rate and Rhythm: Normal rate and regular rhythm.      Pulses: Normal pulses.      Heart sounds: Normal heart sounds. No murmur heard.    No friction rub. No gallop.   Pulmonary:      Effort: Pulmonary effort is normal. No respiratory distress.      Breath sounds: Normal breath sounds. No stridor. No wheezing, rhonchi or rales.   Abdominal:      General: Abdomen is flat. There is no distension.      Palpations: Abdomen is soft. There is no mass.      Tenderness: There is no abdominal tenderness.      Hernia: No hernia is present.   Musculoskeletal:         General: No swelling.      Right lower leg: No edema.      Left lower leg: No edema.   Skin:     Capillary Refill: Capillary refill takes less than 2 seconds.      Coloration: Skin is not jaundiced.      Findings: No " bruising or rash.   Neurological:      General: No focal deficit present.      Mental Status: He is alert and oriented to person, place, and time. Mental status is at baseline.      GCS: GCS eye subscore is 4. GCS verbal subscore is 5. GCS motor subscore is 6.      Cranial Nerves: No cranial nerve deficit, dysarthria or facial asymmetry.      Sensory: Sensation is intact. No sensory deficit.      Motor: No weakness, tremor, abnormal muscle tone or seizure activity.   Psychiatric:         Behavior: Behavior normal. Behavior is cooperative.         Thought Content: Thought content normal.         Judgment: Judgment normal.         Results Reviewed:  I have personally reviewed current lab, radiology, and data and agree with results.  Lab Results (last 24 hours)     Procedure Component Value Units Date/Time    Comprehensive Metabolic Panel [[id number]]  (Abnormal) Collected: 03/22/23 0345    Specimen: Blood Updated: 03/22/23 0415     Glucose 93 mg/dL      BUN 5 mg/dL      Creatinine 0.81 mg/dL      Sodium 137 mmol/L      Potassium 3.5 mmol/L      Chloride 99 mmol/L      CO2 28.0 mmol/L      Calcium 8.5 mg/dL      Total Protein 6.5 g/dL      Albumin 4.1 g/dL      ALT (SGPT) 66 U/L      AST (SGOT) 50 U/L      Alkaline Phosphatase 117 U/L      Total Bilirubin 0.7 mg/dL      Globulin 2.4 gm/dL      A/G Ratio 1.7 g/dL      BUN/Creatinine Ratio 6.2     Anion Gap 10.0 mmol/L      eGFR 117.2 mL/min/1.73     Narrative:      GFR Normal >60  Chronic Kidney Disease <60  Kidney Failure <15      Ethanol [[id number]] Collected: 03/22/23 0345    Specimen: Blood Updated: 03/22/23 0415     Ethanol <10 mg/dL      Ethanol % <0.010 %     Acetaminophen Level [[id number]]  (Normal) Collected: 03/22/23 0345    Specimen: Blood Updated: 03/22/23 0415     Acetaminophen <5.0 mcg/mL     Salicylate Level [[id number]]  (Normal) Collected: 03/22/23 0345    Specimen: Blood Updated: 03/22/23 0415     Salicylate <0.3 mg/dL     CBC & Differential [[id number]]   (Abnormal) Collected: 03/22/23 0345    Specimen: Blood Updated: 03/22/23 0358    Narrative:      The following orders were created for panel order CBC & Differential.  Procedure                               Abnormality         Status                     ---------                               -----------         ------                     CBC Auto Differential[[id number]]        Abnormal            Final result                 Please view results for these tests on the individual orders.    CBC Auto Differential [[id number]]  (Abnormal) Collected: 03/22/23 0345    Specimen: Blood Updated: 03/22/23 0358     WBC 6.62 10*3/mm3      RBC 4.24 10*6/mm3      Hemoglobin 14.5 g/dL      Hematocrit 41.4 %      MCV 97.6 fL      MCH 34.2 pg      MCHC 35.0 g/dL      RDW 13.8 %      RDW-SD 49.2 fl      MPV 9.7 fL      Platelets 233 10*3/mm3      Neutrophil % 70.8 %      Lymphocyte % 16.6 %      Monocyte % 10.9 %      Eosinophil % 0.8 %      Basophil % 0.6 %      Immature Grans % 0.3 %      Neutrophils, Absolute 4.69 10*3/mm3      Lymphocytes, Absolute 1.10 10*3/mm3      Monocytes, Absolute 0.72 10*3/mm3      Eosinophils, Absolute 0.05 10*3/mm3      Basophils, Absolute 0.04 10*3/mm3      Immature Grans, Absolute 0.02 10*3/mm3      nRBC 0.0 /100 WBC         Imaging Results (Last 24 Hours)     ** No results found for the last 24 hours. **            Assessment:    Active Hospital Problems    Diagnosis    • **Alcohol withdrawal (HCC)    • Transaminitis          Medical Decision Making  Number and Complexity of problems: 2 - High to Low    Differential Diagnosis:   As above  ACS  Focal seizure    Conditions and Status:        Condition is improving.     [facility] Data  External documents reviewed: Care everywhere ([facility] summary/documents and paperwork with patient)  My EKG interpretation: *Awaited  My plain film interpretation: *Awaited  Tests considered but not ordered: Nil     Decision rules/scores evaluated (example  [id number], [place], etc):   CIWA     Discussed with: The patient     Treatment Plan  1.  Stat CBC and CMP with ethanol level, acetaminophen, and salicylates  2.  Chest x-ray  3.  ECG   4.  Home medication rec patient  5.  VTE prophylaxis  6.  Alcohol withdrawal protocol with IV phenobarbital  7.  High-dose thiamine      Care Planning  Shared decision making: With the patient  Code status and discussions: Full code    Disposition  Social Determinants of Health that impact treatment or disposition: Nil  I expect the patient to be discharged to home in 3-5 days.       Harshal Jackson MD    Electronically signed by Harshal Jackson MD, 03/22/23, 6:51 AM CDT.

## 2023-03-23 ENCOUNTER — APPOINTMENT (OUTPATIENT)
Dept: GENERAL RADIOLOGY | Facility: HOSPITAL | Age: 38
DRG: 896 | End: 2023-03-23
Payer: COMMERCIAL

## 2023-03-23 LAB
ANION GAP SERPL CALCULATED.3IONS-SCNC: 10 MMOL/L (ref 5–15)
BASOPHILS # BLD AUTO: 0.05 10*3/MM3 (ref 0–0.2)
BASOPHILS NFR BLD AUTO: 0.9 % (ref 0–1.5)
BUN SERPL-MCNC: 2 MG/DL (ref 6–20)
BUN/CREAT SERPL: 2.8 (ref 7–25)
CALCIUM SPEC-SCNC: 8.5 MG/DL (ref 8.6–10.5)
CHLORIDE SERPL-SCNC: 100 MMOL/L (ref 98–107)
CO2 SERPL-SCNC: 22 MMOL/L (ref 22–29)
CREAT SERPL-MCNC: 0.72 MG/DL (ref 0.76–1.27)
DEPRECATED RDW RBC AUTO: 48.3 FL (ref 37–54)
DEPRECATED RDW RBC AUTO: 49.9 FL (ref 37–54)
EGFRCR SERPLBLD CKD-EPI 2021: 121.4 ML/MIN/1.73
EOSINOPHIL # BLD AUTO: 0.12 10*3/MM3 (ref 0–0.4)
EOSINOPHIL NFR BLD AUTO: 2.1 % (ref 0.3–6.2)
ERYTHROCYTE [DISTWIDTH] IN BLOOD BY AUTOMATED COUNT: 13.5 % (ref 12.3–15.4)
ERYTHROCYTE [DISTWIDTH] IN BLOOD BY AUTOMATED COUNT: 13.6 % (ref 12.3–15.4)
GLUCOSE SERPL-MCNC: 105 MG/DL (ref 65–99)
HCT VFR BLD AUTO: 37.5 % (ref 37.5–51)
HCT VFR BLD AUTO: 37.6 % (ref 37.5–51)
HGB BLD-MCNC: 13 G/DL (ref 13–17.7)
HGB BLD-MCNC: 13.3 G/DL (ref 13–17.7)
IMM GRANULOCYTES # BLD AUTO: 0.01 10*3/MM3 (ref 0–0.05)
IMM GRANULOCYTES NFR BLD AUTO: 0.2 % (ref 0–0.5)
LYMPHOCYTES # BLD AUTO: 1.36 10*3/MM3 (ref 0.7–3.1)
LYMPHOCYTES NFR BLD AUTO: 24.3 % (ref 19.6–45.3)
MAGNESIUM SERPL-MCNC: 1.9 MG/DL (ref 1.6–2.6)
MCH RBC QN AUTO: 34.3 PG (ref 26.6–33)
MCH RBC QN AUTO: 34.4 PG (ref 26.6–33)
MCHC RBC AUTO-ENTMCNC: 34.7 G/DL (ref 31.5–35.7)
MCHC RBC AUTO-ENTMCNC: 35.4 G/DL (ref 31.5–35.7)
MCV RBC AUTO: 96.9 FL (ref 79–97)
MCV RBC AUTO: 99.2 FL (ref 79–97)
MONOCYTES # BLD AUTO: 0.61 10*3/MM3 (ref 0.1–0.9)
MONOCYTES NFR BLD AUTO: 10.9 % (ref 5–12)
NEUTROPHILS NFR BLD AUTO: 3.45 10*3/MM3 (ref 1.7–7)
NEUTROPHILS NFR BLD AUTO: 61.6 % (ref 42.7–76)
NRBC BLD AUTO-RTO: 0 /100 WBC (ref 0–0.2)
PLATELET # BLD AUTO: 182 10*3/MM3 (ref 140–450)
PLATELET # BLD AUTO: 183 10*3/MM3 (ref 140–450)
PMV BLD AUTO: 10.1 FL (ref 6–12)
PMV BLD AUTO: 10.3 FL (ref 6–12)
POTASSIUM SERPL-SCNC: 3.1 MMOL/L (ref 3.5–5.2)
QT INTERVAL: 386 MS
QTC INTERVAL: 453 MS
RBC # BLD AUTO: 3.78 10*6/MM3 (ref 4.14–5.8)
RBC # BLD AUTO: 3.88 10*6/MM3 (ref 4.14–5.8)
SODIUM SERPL-SCNC: 132 MMOL/L (ref 136–145)
WBC NRBC COR # BLD: 5.28 10*3/MM3 (ref 3.4–10.8)
WBC NRBC COR # BLD: 5.6 10*3/MM3 (ref 3.4–10.8)

## 2023-03-23 PROCEDURE — 25010000002 SUCCINYLCHOLINE PER 20 MG

## 2023-03-23 PROCEDURE — 25010000002 PROPOFOL 10 MG/ML EMULSION

## 2023-03-23 PROCEDURE — 5A1955Z RESPIRATORY VENTILATION, GREATER THAN 96 CONSECUTIVE HOURS: ICD-10-PCS | Performed by: INTERNAL MEDICINE

## 2023-03-23 PROCEDURE — 25010000002 ENOXAPARIN PER 10 MG

## 2023-03-23 PROCEDURE — 0BH17EZ INSERTION OF ENDOTRACHEAL AIRWAY INTO TRACHEA, VIA NATURAL OR ARTIFICIAL OPENING: ICD-10-PCS | Performed by: INTERNAL MEDICINE

## 2023-03-23 PROCEDURE — 71045 X-RAY EXAM CHEST 1 VIEW: CPT

## 2023-03-23 PROCEDURE — 85025 COMPLETE CBC W/AUTO DIFF WBC: CPT | Performed by: FAMILY MEDICINE

## 2023-03-23 PROCEDURE — 85027 COMPLETE CBC AUTOMATED: CPT

## 2023-03-23 PROCEDURE — 86803 HEPATITIS C AB TEST: CPT

## 2023-03-23 PROCEDURE — 87340 HEPATITIS B SURFACE AG IA: CPT

## 2023-03-23 PROCEDURE — 83735 ASSAY OF MAGNESIUM: CPT

## 2023-03-23 PROCEDURE — 0 MAGNESIUM SULFATE 4 GM/100ML SOLUTION: Performed by: NURSE PRACTITIONER

## 2023-03-23 PROCEDURE — G0432 EIA HIV-1/HIV-2 SCREEN: HCPCS

## 2023-03-23 PROCEDURE — 25010000002 PHENOBARBITAL PER 120 MG

## 2023-03-23 PROCEDURE — 25010000002 LORAZEPAM PER 2 MG: Performed by: NURSE PRACTITIONER

## 2023-03-23 PROCEDURE — 25010000002 LORAZEPAM PER 2 MG

## 2023-03-23 PROCEDURE — 25010000002 HALOPERIDOL LACTATE PER 5 MG

## 2023-03-23 PROCEDURE — 83735 ASSAY OF MAGNESIUM: CPT | Performed by: FAMILY MEDICINE

## 2023-03-23 PROCEDURE — 25010000002 THIAMINE PER 100 MG

## 2023-03-23 PROCEDURE — 84132 ASSAY OF SERUM POTASSIUM: CPT | Performed by: NURSE PRACTITIONER

## 2023-03-23 PROCEDURE — 80053 COMPREHEN METABOLIC PANEL: CPT | Performed by: FAMILY MEDICINE

## 2023-03-23 PROCEDURE — 86706 HEP B SURFACE ANTIBODY: CPT

## 2023-03-23 RX ORDER — MAGNESIUM SULFATE HEPTAHYDRATE 40 MG/ML
2 INJECTION, SOLUTION INTRAVENOUS AS NEEDED
Status: DISCONTINUED | OUTPATIENT
Start: 2023-03-23 | End: 2023-04-05 | Stop reason: HOSPADM

## 2023-03-23 RX ORDER — MAGNESIUM SULFATE HEPTAHYDRATE 40 MG/ML
4 INJECTION, SOLUTION INTRAVENOUS AS NEEDED
Status: DISCONTINUED | OUTPATIENT
Start: 2023-03-23 | End: 2023-04-05 | Stop reason: HOSPADM

## 2023-03-23 RX ORDER — ETOMIDATE 2 MG/ML
20 INJECTION INTRAVENOUS ONCE
Status: COMPLETED | OUTPATIENT
Start: 2023-03-23 | End: 2023-03-23

## 2023-03-23 RX ORDER — LORAZEPAM 2 MG/ML
4 INJECTION INTRAMUSCULAR ONCE
Status: COMPLETED | OUTPATIENT
Start: 2023-03-23 | End: 2023-03-23

## 2023-03-23 RX ORDER — CHLORHEXIDINE GLUCONATE 0.12 MG/ML
15 RINSE ORAL EVERY 12 HOURS SCHEDULED
Status: DISCONTINUED | OUTPATIENT
Start: 2023-03-24 | End: 2023-04-05 | Stop reason: HOSPADM

## 2023-03-23 RX ORDER — LORAZEPAM 2 MG/ML
2 INJECTION INTRAMUSCULAR EVERY 4 HOURS
Status: DISCONTINUED | OUTPATIENT
Start: 2023-03-23 | End: 2023-03-23

## 2023-03-23 RX ORDER — POTASSIUM CHLORIDE 1.5 G/1.77G
40 POWDER, FOR SOLUTION ORAL AS NEEDED
Status: DISCONTINUED | OUTPATIENT
Start: 2023-03-23 | End: 2023-04-05 | Stop reason: HOSPADM

## 2023-03-23 RX ORDER — LORAZEPAM 2 MG/ML
4 INJECTION INTRAMUSCULAR ONCE
Status: DISCONTINUED | OUTPATIENT
Start: 2023-03-23 | End: 2023-03-23

## 2023-03-23 RX ORDER — POTASSIUM CHLORIDE 750 MG/1
40 CAPSULE, EXTENDED RELEASE ORAL AS NEEDED
Status: DISCONTINUED | OUTPATIENT
Start: 2023-03-23 | End: 2023-04-05 | Stop reason: HOSPADM

## 2023-03-23 RX ORDER — PROPOFOL 10 MG/ML
VIAL (ML) INTRAVENOUS
Status: COMPLETED
Start: 2023-03-23 | End: 2023-03-23

## 2023-03-23 RX ORDER — SUCCINYLCHOLINE CHLORIDE 20 MG/ML
80 INJECTION INTRAMUSCULAR; INTRAVENOUS ONCE
Status: COMPLETED | OUTPATIENT
Start: 2023-03-23 | End: 2023-03-23

## 2023-03-23 RX ORDER — PANTOPRAZOLE SODIUM 40 MG/10ML
40 INJECTION, POWDER, LYOPHILIZED, FOR SOLUTION INTRAVENOUS
Status: DISCONTINUED | OUTPATIENT
Start: 2023-03-24 | End: 2023-04-05 | Stop reason: HOSPADM

## 2023-03-23 RX ORDER — HALOPERIDOL 5 MG/ML
INJECTION INTRAMUSCULAR
Status: DISPENSED
Start: 2023-03-23 | End: 2023-03-24

## 2023-03-23 RX ORDER — HALOPERIDOL 5 MG/ML
10 INJECTION INTRAMUSCULAR ONCE
Status: COMPLETED | OUTPATIENT
Start: 2023-03-23 | End: 2023-03-23

## 2023-03-23 RX ORDER — POTASSIUM CHLORIDE 7.45 MG/ML
10 INJECTION INTRAVENOUS
Status: DISCONTINUED | OUTPATIENT
Start: 2023-03-23 | End: 2023-04-05 | Stop reason: HOSPADM

## 2023-03-23 RX ORDER — PANTOPRAZOLE SODIUM 40 MG/1
40 TABLET, DELAYED RELEASE ORAL
Status: DISCONTINUED | OUTPATIENT
Start: 2023-03-23 | End: 2023-03-23

## 2023-03-23 RX ADMIN — PROPOFOL 50 MCG/KG/MIN: 10 INJECTION, EMULSION INTRAVENOUS at 23:42

## 2023-03-23 RX ADMIN — LORAZEPAM 2 MG: 2 INJECTION INTRAMUSCULAR; INTRAVENOUS at 11:24

## 2023-03-23 RX ADMIN — LORAZEPAM 0.5 MG/HR: 2 INJECTION INTRAMUSCULAR; INTRAVENOUS at 23:36

## 2023-03-23 RX ADMIN — PHENOBARBITAL SODIUM 65 MG: 65 INJECTION INTRAMUSCULAR; INTRAVENOUS at 00:16

## 2023-03-23 RX ADMIN — ETOMIDATE 20 MG: 20 INJECTION, SOLUTION INTRAVENOUS at 22:47

## 2023-03-23 RX ADMIN — LORAZEPAM 4 MG: 2 INJECTION, SOLUTION INTRAMUSCULAR; INTRAVENOUS at 22:22

## 2023-03-23 RX ADMIN — PHENOBARBITAL SODIUM 65 MG: 65 INJECTION INTRAMUSCULAR; INTRAVENOUS at 14:41

## 2023-03-23 RX ADMIN — LORAZEPAM 2 MG: 2 INJECTION INTRAMUSCULAR; INTRAVENOUS at 22:40

## 2023-03-23 RX ADMIN — LORAZEPAM 2 MG: 2 INJECTION INTRAMUSCULAR; INTRAVENOUS at 22:03

## 2023-03-23 RX ADMIN — POTASSIUM CHLORIDE 40 MEQ: 10 CAPSULE, COATED, EXTENDED RELEASE ORAL at 11:23

## 2023-03-23 RX ADMIN — LORAZEPAM 2 MG: 2 INJECTION INTRAMUSCULAR; INTRAVENOUS at 21:45

## 2023-03-23 RX ADMIN — ENOXAPARIN SODIUM 40 MG: 40 INJECTION SUBCUTANEOUS at 08:40

## 2023-03-23 RX ADMIN — LORAZEPAM 1 MG: 2 INJECTION INTRAMUSCULAR; INTRAVENOUS at 17:23

## 2023-03-23 RX ADMIN — NICOTINE 1 PATCH: 21 PATCH, EXTENDED RELEASE TRANSDERMAL at 03:40

## 2023-03-23 RX ADMIN — SUCCINYLCHOLINE CHLORIDE 80 MG: 20 INJECTION, SOLUTION INTRAMUSCULAR; INTRAVENOUS at 22:47

## 2023-03-23 RX ADMIN — LORAZEPAM 2 MG: 2 INJECTION INTRAMUSCULAR; INTRAVENOUS at 03:39

## 2023-03-23 RX ADMIN — SODIUM CHLORIDE, POTASSIUM CHLORIDE, SODIUM LACTATE AND CALCIUM CHLORIDE 150 ML/HR: 600; 310; 30; 20 INJECTION, SOLUTION INTRAVENOUS at 11:22

## 2023-03-23 RX ADMIN — HALOPERIDOL LACTATE 10 MG: 5 INJECTION, SOLUTION INTRAMUSCULAR at 22:26

## 2023-03-23 RX ADMIN — LORAZEPAM 1 MG: 2 INJECTION INTRAMUSCULAR; INTRAVENOUS at 19:21

## 2023-03-23 RX ADMIN — MAGNESIUM SULFATE 4 G: 4 INJECTION INTRAVENOUS at 12:26

## 2023-03-23 RX ADMIN — LORAZEPAM 2 MG: 2 INJECTION INTRAMUSCULAR; INTRAVENOUS at 15:14

## 2023-03-23 RX ADMIN — POTASSIUM CHLORIDE 40 MEQ: 10 CAPSULE, COATED, EXTENDED RELEASE ORAL at 15:13

## 2023-03-23 RX ADMIN — THIAMINE HYDROCHLORIDE 250 MG: 100 INJECTION, SOLUTION INTRAMUSCULAR; INTRAVENOUS at 15:14

## 2023-03-23 RX ADMIN — THIAMINE HYDROCHLORIDE 250 MG: 100 INJECTION, SOLUTION INTRAMUSCULAR; INTRAVENOUS at 20:39

## 2023-03-23 RX ADMIN — LORAZEPAM 2 MG: 2 INJECTION INTRAMUSCULAR; INTRAVENOUS at 20:39

## 2023-03-23 RX ADMIN — LORAZEPAM 2 MG: 2 INJECTION INTRAMUSCULAR; INTRAVENOUS at 21:15

## 2023-03-23 RX ADMIN — POTASSIUM CHLORIDE 40 MEQ: 10 CAPSULE, COATED, EXTENDED RELEASE ORAL at 18:59

## 2023-03-23 RX ADMIN — PANTOPRAZOLE SODIUM 40 MG: 40 TABLET, DELAYED RELEASE ORAL at 11:23

## 2023-03-23 RX ADMIN — SODIUM CHLORIDE, POTASSIUM CHLORIDE, SODIUM LACTATE AND CALCIUM CHLORIDE 150 ML/HR: 600; 310; 30; 20 INJECTION, SOLUTION INTRAVENOUS at 03:40

## 2023-03-23 RX ADMIN — Medication 10 ML: at 21:55

## 2023-03-23 RX ADMIN — ATENOLOL 100 MG: 50 TABLET ORAL at 08:39

## 2023-03-23 RX ADMIN — LORAZEPAM 2 MG: 2 INJECTION INTRAMUSCULAR; INTRAVENOUS at 23:21

## 2023-03-23 RX ADMIN — THIAMINE HYDROCHLORIDE 250 MG: 100 INJECTION, SOLUTION INTRAMUSCULAR; INTRAVENOUS at 08:40

## 2023-03-23 NOTE — NURSING NOTE
Pt resting in bed with wife at bedside, CL reported to me that pt was wanting to leave. Signee talked with pt at this time. Pt stated that he just wanted to go outside and smoke. Explained to pt that he is not able to leave the floor and that this is a nonsmoking facility. Pt aggreable to have a nictone patch tonight. Prn meds giving as well. Will cont to monitor.

## 2023-03-23 NOTE — PROGRESS NOTES
"    [facility] Medicine Services  INPATIENT PROGRESS NOTE    Length of Stay: 1  Date of Admission: 3/22/2023  Primary Care Physician: Brandon Darling MD    Subjective   Chief Complaint: hallucinations overnight, tearful today   HPI: 37 year old male with past medical history of alcohol abuse, depression, HTN who presented as a transfer from [facility] on 3/22/23 related to alcohol withdrawals.  During today's visit, patient reports feeling slightly better aside from anxiety and tearfulness.  He reports having hallucinations overnight.  He requests to see psychiatry as he states he is serious about attempting alcohol cessation and reports that he believes the alcohol cravings are related to his depression related to \"tough childhood.\"  Patient did not elaborate on his childhood but asks to speak to a counselor or specialist regarding his depression related to it.     Review of Systems   Constitutional: Negative for chills and fatigue.   HENT: Negative for congestion, rhinorrhea and sore throat.    Respiratory: Negative for cough, chest tightness, shortness of breath and wheezing.    Cardiovascular: Negative for chest pain, palpitations and leg swelling.   Gastrointestinal: Negative for abdominal pain, nausea and vomiting.   Musculoskeletal: Negative for back pain and neck pain.   Skin: Negative for pallor.   Neurological: Negative for dizziness, weakness and headaches.   Psychiatric/Behavioral: Positive for hallucinations. Negative for agitation and confusion. The patient is nervous/anxious.         All pertinent negatives and positives are as above. All other systems have been reviewed and are negative unless otherwise stated.     Objective    Temp:  [97 °F (36.1 °C)-98.7 °F (37.1 °C)] 97.8 °F (36.6 °C)  Heart Rate:  [72-84] 80  Resp:  [18-20] 20  BP: (125-141)/(70-98) 132/70    Physical Exam  Vitals and nursing note reviewed.   Constitutional:       " General: He is not in acute distress.  HENT:      Head: Normocephalic and atraumatic.      Right Ear: External ear normal.      Left Ear: External ear normal.      Nose: Nose normal.      Mouth/Throat:      Mouth: Mucous membranes are moist.      Pharynx: Oropharynx is clear.   Eyes:      General: No scleral icterus.        Right eye: No discharge.         Left eye: No discharge.      Conjunctiva/sclera: Conjunctivae normal.   Cardiovascular:      Rate and Rhythm: Normal rate and regular rhythm.      Heart sounds: Normal heart sounds. No murmur heard.    No friction rub. No gallop.   Pulmonary:      Effort: Pulmonary effort is normal. No respiratory distress.      Breath sounds: Normal breath sounds. No stridor. No wheezing, rhonchi or rales.   Abdominal:      General: Bowel sounds are normal. There is no distension.      Palpations: Abdomen is soft.      Tenderness: There is no abdominal tenderness.   Musculoskeletal:         General: No swelling. Normal range of motion.      Cervical back: Normal range of motion.   Skin:     General: Skin is warm and dry.   Neurological:      General: No focal deficit present.      Mental Status: He is alert and oriented to person, place, and time.      Motor: No tremor or seizure activity.   Psychiatric:         Mood and Affect: Mood is anxious.         Speech: Speech normal.      Comments: Reported hallucinations on overnight shift.              Results Review:  I have reviewed the labs, radiology results, and diagnostic studies.    Laboratory Data:   Results from last 7 days   Lab Units 03/23/23  0626 03/22/23  0345   SODIUM mmol/L 132* 137   POTASSIUM mmol/L 3.1* 3.5   CHLORIDE mmol/L 100 99   CO2 mmol/L 22.0 28.0   BUN mg/dL 2* 5*   CREATININE mg/dL 0.72* 0.81   GLUCOSE mg/dL 105* 93   CALCIUM mg/dL 8.5* 8.5*   BILIRUBIN mg/dL  --  0.7   ALK PHOS U/L  --  117   ALT (SGPT) U/L  --  66*   AST (SGOT) U/L  --  50*   ANION GAP mmol/L 10.0 10.0     CrCl cannot be calculated  (Unknown ideal weight.).  Results from last 7 days   Lab Units 03/23/23  0626   MAGNESIUM mg/dL 1.9         Results from last 7 days   Lab Units 03/23/23  0626 03/22/23  0345   WBC 10*3/mm3 5.60 6.62   HEMOGLOBIN g/dL 13.3 14.5   HEMATOCRIT % 37.6 41.4   PLATELETS 10*3/mm3 182 233           Culture Data:   No results found for: BLOODCX  No results found for: URINECX  No results found for: RESPCX  No results found for: WOUNDCX  No results found for: STOOLCX  No components found for: BODYFLD    Radiology Data:   Imaging Results (Last 24 Hours)     Procedure Component Value Units Date/Time    XR Chest 1 View [[id number]] Collected: 03/22/23 0722     Updated: 03/22/23 1613    Narrative:      Chest x-ray single view.       CLINICAL INDICATION: Shortness of breath . Dyspnea    COMPARISON: None    FINDINGS: Cardiac silhouette is normal in size. Pulmonary  vascularity is unremarkable.     No focal infiltrate or consolidation.  No pleural effusion.  No  pneumothorax.      Impression:      No evidence of active disease.    Electronically signed by:  Robin Varghese MD  3/22/2023 4:10 PM CDT  Workstation: [id number]          I have reviewed the patient's current medications.     Assessment/Plan     Active Hospital Problems    Diagnosis    • **Alcohol withdrawal (HCC)    • Transaminitis        Plan:    1. Alcohol abuse with DTs: patient reports history of drinking as much as 12 pack of beer, fifth of liquor, and 2-3 margaritas or tequila shots daily.  continue Phenobarbitol protocol and PRN Ativan with CIWA scoring.  Case discussed with Dr. Perez due to patient's underlying depression as well as DTs with hallucinations overnight.  He recommends IV Ativan 2 mg every four hours for now and he will assess patient.  Continue thiamine replacement.  Pt voices that his intentions are to stop drinking and that he wishes to be detoxed.    2. Anxiety/depression: psychiatry to see.  Not currently on medications.   3. HTN: continue  Atenolol  4. GERD: continue Protonix.    5. Hypokalemia: continue replacement protocol.  Currently 3.1  6. Hypomagnesemia: continue replacement protocol.  Currently 1.9  7. Tobacco use: continue nicotine patch.     Medical Decision Making  Number and Complexity of problems: 7 high complexity     Conditions and Status:        Condition is unchanged.     [facility] Data  External documents reviewed: vital signs, labs including BMP, magnesium, CBC  My EKG interpretation: NSR  My plain film interpretation: no acute findings   Tests considered but not ordered: n/a     Decision rules/scores evaluated (example [id number], Wells, etc): n/a     Discussed with: patient, RN, charge RN, case management team.     Care Planning  Code status and discussions: Full code status on file.     Disposition  Social Determinants of Health that impact treatment or disposition: -substance abuse history     I expect the patient to be discharged to home in 3-4 days.     This document has been electronically signed by KINGSLEY Love on March 23, 2023 12:48 CDT

## 2023-03-23 NOTE — PLAN OF CARE
Goal Outcome Evaluation:  Plan of Care Reviewed With: patient        Progress: no change  Outcome Evaluation: VSS. PRN ativan given for CIWA score of 9. meds given per orders.

## 2023-03-24 ENCOUNTER — APPOINTMENT (OUTPATIENT)
Dept: GENERAL RADIOLOGY | Facility: HOSPITAL | Age: 38
DRG: 896 | End: 2023-03-24
Payer: COMMERCIAL

## 2023-03-24 PROBLEM — F10.931 DELIRIUM TREMENS: Status: ACTIVE | Noted: 2023-03-24

## 2023-03-24 PROBLEM — F39 AFFECTIVE DISORDER: Status: ACTIVE | Noted: 2023-03-24

## 2023-03-24 PROBLEM — F41.9 ANXIETY DISORDER: Status: ACTIVE | Noted: 2023-03-24

## 2023-03-24 LAB
ALBUMIN SERPL-MCNC: 3.6 G/DL (ref 3.5–5.2)
ALBUMIN SERPL-MCNC: 3.8 G/DL (ref 3.5–5.2)
ALBUMIN/GLOB SERPL: 1.6 G/DL
ALBUMIN/GLOB SERPL: 1.6 G/DL
ALP SERPL-CCNC: 101 U/L (ref 39–117)
ALP SERPL-CCNC: 97 U/L (ref 39–117)
ALT SERPL W P-5'-P-CCNC: 52 U/L (ref 1–41)
ALT SERPL W P-5'-P-CCNC: 55 U/L (ref 1–41)
ANION GAP SERPL CALCULATED.3IONS-SCNC: 10 MMOL/L (ref 5–15)
ANION GAP SERPL CALCULATED.3IONS-SCNC: 12 MMOL/L (ref 5–15)
ARTERIAL PATENCY WRIST A: ABNORMAL
AST SERPL-CCNC: 59 U/L (ref 1–40)
AST SERPL-CCNC: 75 U/L (ref 1–40)
ATMOSPHERIC PRESS: 744 MMHG
BASE EXCESS BLDA CALC-SCNC: 0.6 MMOL/L (ref 0–2)
BASOPHILS # BLD AUTO: 0.05 10*3/MM3 (ref 0–0.2)
BASOPHILS NFR BLD AUTO: 0.8 % (ref 0–1.5)
BDY SITE: ABNORMAL
BILIRUB SERPL-MCNC: 0.7 MG/DL (ref 0–1.2)
BILIRUB SERPL-MCNC: 0.8 MG/DL (ref 0–1.2)
BUN SERPL-MCNC: 2 MG/DL (ref 6–20)
BUN SERPL-MCNC: 2 MG/DL (ref 6–20)
BUN/CREAT SERPL: 2.5 (ref 7–25)
BUN/CREAT SERPL: 3.2 (ref 7–25)
CALCIUM SPEC-SCNC: 8.5 MG/DL (ref 8.6–10.5)
CALCIUM SPEC-SCNC: 8.7 MG/DL (ref 8.6–10.5)
CHLORIDE SERPL-SCNC: 106 MMOL/L (ref 98–107)
CHLORIDE SERPL-SCNC: 106 MMOL/L (ref 98–107)
CO2 SERPL-SCNC: 21 MMOL/L (ref 22–29)
CO2 SERPL-SCNC: 23 MMOL/L (ref 22–29)
CREAT SERPL-MCNC: 0.63 MG/DL (ref 0.76–1.27)
CREAT SERPL-MCNC: 0.8 MG/DL (ref 0.76–1.27)
DEPRECATED RDW RBC AUTO: 50.6 FL (ref 37–54)
EGFRCR SERPLBLD CKD-EPI 2021: 117.6 ML/MIN/1.73
EGFRCR SERPLBLD CKD-EPI 2021: 126.4 ML/MIN/1.73
EOSINOPHIL # BLD AUTO: 0.1 10*3/MM3 (ref 0–0.4)
EOSINOPHIL NFR BLD AUTO: 1.7 % (ref 0.3–6.2)
ERYTHROCYTE [DISTWIDTH] IN BLOOD BY AUTOMATED COUNT: 13.9 % (ref 12.3–15.4)
GLOBULIN UR ELPH-MCNC: 2.3 GM/DL
GLOBULIN UR ELPH-MCNC: 2.4 GM/DL
GLUCOSE BLDC GLUCOMTR-MCNC: 103 MG/DL (ref 70–130)
GLUCOSE SERPL-MCNC: 102 MG/DL (ref 65–99)
GLUCOSE SERPL-MCNC: 85 MG/DL (ref 65–99)
HBV SURFACE AB SER RIA-ACNC: NORMAL
HBV SURFACE AG SERPL QL IA: NORMAL
HCO3 BLDA-SCNC: 25.5 MMOL/L (ref 20–26)
HCT VFR BLD AUTO: 36.1 % (ref 37.5–51)
HCV AB SER DONR QL: NORMAL
HGB BLD-MCNC: 12.4 G/DL (ref 13–17.7)
HIV1+2 AB SER QL: NORMAL
IMM GRANULOCYTES # BLD AUTO: 0.02 10*3/MM3 (ref 0–0.05)
IMM GRANULOCYTES NFR BLD AUTO: 0.3 % (ref 0–0.5)
INHALED O2 CONCENTRATION: 75 %
LYMPHOCYTES # BLD AUTO: 1.18 10*3/MM3 (ref 0.7–3.1)
LYMPHOCYTES NFR BLD AUTO: 20 % (ref 19.6–45.3)
Lab: ABNORMAL
MAGNESIUM SERPL-MCNC: 2.4 MG/DL (ref 1.6–2.6)
MAGNESIUM SERPL-MCNC: 2.5 MG/DL (ref 1.6–2.6)
MCH RBC QN AUTO: 34.3 PG (ref 26.6–33)
MCHC RBC AUTO-ENTMCNC: 34.3 G/DL (ref 31.5–35.7)
MCV RBC AUTO: 99.7 FL (ref 79–97)
MODALITY: ABNORMAL
MONOCYTES # BLD AUTO: 0.57 10*3/MM3 (ref 0.1–0.9)
MONOCYTES NFR BLD AUTO: 9.7 % (ref 5–12)
NEUTROPHILS NFR BLD AUTO: 3.98 10*3/MM3 (ref 1.7–7)
NEUTROPHILS NFR BLD AUTO: 67.5 % (ref 42.7–76)
NRBC BLD AUTO-RTO: 0 /100 WBC (ref 0–0.2)
PCO2 BLDA: 41.1 MM HG (ref 35–45)
PEEP RESPIRATORY: 5 CM[H2O]
PH BLDA: 7.4 PH UNITS (ref 7.35–7.45)
PLATELET # BLD AUTO: 158 10*3/MM3 (ref 140–450)
PMV BLD AUTO: 10 FL (ref 6–12)
PO2 BLDA: 330 MM HG (ref 83–108)
POTASSIUM SERPL-SCNC: 3.5 MMOL/L (ref 3.5–5.2)
POTASSIUM SERPL-SCNC: 4 MMOL/L (ref 3.5–5.2)
POTASSIUM SERPL-SCNC: 4 MMOL/L (ref 3.5–5.2)
POTASSIUM SERPL-SCNC: 4.8 MMOL/L (ref 3.5–5.2)
PROT SERPL-MCNC: 5.9 G/DL (ref 6–8.5)
PROT SERPL-MCNC: 6.2 G/DL (ref 6–8.5)
RBC # BLD AUTO: 3.62 10*6/MM3 (ref 4.14–5.8)
SAO2 % BLDCOA: >100 % (ref 94–99)
SET MECH RESP RATE: 16
SODIUM SERPL-SCNC: 139 MMOL/L (ref 136–145)
SODIUM SERPL-SCNC: 139 MMOL/L (ref 136–145)
VENTILATOR MODE: AC
VT ON VENT VENT: 550 ML
WBC NRBC COR # BLD: 5.9 10*3/MM3 (ref 3.4–10.8)
WHOLE BLOOD HOLD SPECIMEN: NORMAL

## 2023-03-24 PROCEDURE — 84132 ASSAY OF SERUM POTASSIUM: CPT | Performed by: FAMILY MEDICINE

## 2023-03-24 PROCEDURE — 82803 BLOOD GASES ANY COMBINATION: CPT

## 2023-03-24 PROCEDURE — 83735 ASSAY OF MAGNESIUM: CPT | Performed by: NURSE PRACTITIONER

## 2023-03-24 PROCEDURE — 74018 RADEX ABDOMEN 1 VIEW: CPT

## 2023-03-24 PROCEDURE — 85025 COMPLETE CBC W/AUTO DIFF WBC: CPT

## 2023-03-24 PROCEDURE — 25010000002 PROPOFOL 10 MG/ML EMULSION

## 2023-03-24 PROCEDURE — 25010000002 ENOXAPARIN PER 10 MG

## 2023-03-24 PROCEDURE — 80053 COMPREHEN METABOLIC PANEL: CPT

## 2023-03-24 PROCEDURE — 82962 GLUCOSE BLOOD TEST: CPT

## 2023-03-24 PROCEDURE — 31500 INSERT EMERGENCY AIRWAY: CPT

## 2023-03-24 PROCEDURE — 94003 VENT MGMT INPAT SUBQ DAY: CPT

## 2023-03-24 PROCEDURE — 94002 VENT MGMT INPAT INIT DAY: CPT

## 2023-03-24 PROCEDURE — 94799 UNLISTED PULMONARY SVC/PX: CPT

## 2023-03-24 PROCEDURE — 25010000002 LORAZEPAM PER 2 MG

## 2023-03-24 PROCEDURE — 36600 WITHDRAWAL OF ARTERIAL BLOOD: CPT

## 2023-03-24 PROCEDURE — 25010000002 THIAMINE PER 100 MG

## 2023-03-24 PROCEDURE — 25010000002 PHENOBARBITAL PER 120 MG

## 2023-03-24 PROCEDURE — 25010000002 PHENOBARBITAL PER 120 MG: Performed by: PSYCHIATRY & NEUROLOGY

## 2023-03-24 PROCEDURE — 90792 PSYCH DIAG EVAL W/MED SRVCS: CPT | Performed by: PSYCHIATRY & NEUROLOGY

## 2023-03-24 PROCEDURE — 94761 N-INVAS EAR/PLS OXIMETRY MLT: CPT

## 2023-03-24 RX ORDER — LORAZEPAM 2 MG/ML
4 INJECTION INTRAMUSCULAR
Status: DISPENSED | OUTPATIENT
Start: 2023-03-24 | End: 2023-04-03

## 2023-03-24 RX ORDER — METHYLPREDNISOLONE SODIUM SUCCINATE 40 MG/ML
INJECTION, POWDER, LYOPHILIZED, FOR SOLUTION INTRAMUSCULAR; INTRAVENOUS
Status: DISPENSED
Start: 2023-03-24 | End: 2023-03-24

## 2023-03-24 RX ORDER — PHENOBARBITAL 32.4 MG/1
32.4 TABLET ORAL ONCE
Status: DISCONTINUED | OUTPATIENT
Start: 2023-03-26 | End: 2023-03-25

## 2023-03-24 RX ORDER — PHENOBARBITAL SODIUM 130 MG/ML
200 INJECTION INTRAMUSCULAR ONCE
Status: DISCONTINUED | OUTPATIENT
Start: 2023-03-24 | End: 2023-03-24

## 2023-03-24 RX ORDER — PHENOBARBITAL SODIUM 130 MG/ML
100 INJECTION INTRAMUSCULAR 3 TIMES DAILY
Status: DISCONTINUED | OUTPATIENT
Start: 2023-03-24 | End: 2023-03-24

## 2023-03-24 RX ORDER — PHENOBARBITAL 32.4 MG/1
32.4 TABLET ORAL ONCE
Status: DISCONTINUED | OUTPATIENT
Start: 2023-03-27 | End: 2023-03-25

## 2023-03-24 RX ADMIN — LORAZEPAM 4 MG/HR: 2 INJECTION INTRAMUSCULAR; INTRAVENOUS at 20:20

## 2023-03-24 RX ADMIN — SODIUM CHLORIDE, POTASSIUM CHLORIDE, SODIUM LACTATE AND CALCIUM CHLORIDE 150 ML/HR: 600; 310; 30; 20 INJECTION, SOLUTION INTRAVENOUS at 14:41

## 2023-03-24 RX ADMIN — THIAMINE HYDROCHLORIDE 250 MG: 100 INJECTION, SOLUTION INTRAMUSCULAR; INTRAVENOUS at 08:51

## 2023-03-24 RX ADMIN — CHLORHEXIDINE GLUCONATE 0.12% ORAL RINSE 15 ML: 1.2 LIQUID ORAL at 21:47

## 2023-03-24 RX ADMIN — PANTOPRAZOLE SODIUM 40 MG: 40 INJECTION, POWDER, FOR SOLUTION INTRAVENOUS at 06:08

## 2023-03-24 RX ADMIN — NICOTINE 1 PATCH: 21 PATCH, EXTENDED RELEASE TRANSDERMAL at 04:45

## 2023-03-24 RX ADMIN — THIAMINE HYDROCHLORIDE 250 MG: 100 INJECTION, SOLUTION INTRAMUSCULAR; INTRAVENOUS at 21:47

## 2023-03-24 RX ADMIN — PROPOFOL 50 MCG/KG/MIN: 10 INJECTION, EMULSION INTRAVENOUS at 04:44

## 2023-03-24 RX ADMIN — PROPOFOL 40 MCG/KG/MIN: 10 INJECTION, EMULSION INTRAVENOUS at 20:12

## 2023-03-24 RX ADMIN — SODIUM CHLORIDE, POTASSIUM CHLORIDE, SODIUM LACTATE AND CALCIUM CHLORIDE 150 ML/HR: 600; 310; 30; 20 INJECTION, SOLUTION INTRAVENOUS at 06:11

## 2023-03-24 RX ADMIN — PROPOFOL 40 MCG/KG/MIN: 10 INJECTION, EMULSION INTRAVENOUS at 23:40

## 2023-03-24 RX ADMIN — PROPOFOL 50 MCG/KG/MIN: 10 INJECTION, EMULSION INTRAVENOUS at 00:29

## 2023-03-24 RX ADMIN — POTASSIUM CHLORIDE 40 MEQ: 1.5 POWDER, FOR SOLUTION ORAL at 16:28

## 2023-03-24 RX ADMIN — SODIUM CHLORIDE, POTASSIUM CHLORIDE, SODIUM LACTATE AND CALCIUM CHLORIDE 150 ML/HR: 600; 310; 30; 20 INJECTION, SOLUTION INTRAVENOUS at 22:05

## 2023-03-24 RX ADMIN — THIAMINE HYDROCHLORIDE 250 MG: 100 INJECTION, SOLUTION INTRAMUSCULAR; INTRAVENOUS at 15:08

## 2023-03-24 RX ADMIN — PROPOFOL 40 MCG/KG/MIN: 10 INJECTION, EMULSION INTRAVENOUS at 08:46

## 2023-03-24 RX ADMIN — ENOXAPARIN SODIUM 40 MG: 40 INJECTION SUBCUTANEOUS at 08:46

## 2023-03-24 RX ADMIN — CHLORHEXIDINE GLUCONATE 0.12% ORAL RINSE 15 ML: 1.2 LIQUID ORAL at 08:50

## 2023-03-24 RX ADMIN — PHENOBARBITAL SODIUM 100 MG: 130 INJECTION INTRAMUSCULAR at 10:02

## 2023-03-24 RX ADMIN — CHLORHEXIDINE GLUCONATE 0.12% ORAL RINSE 15 ML: 1.2 LIQUID ORAL at 00:22

## 2023-03-24 RX ADMIN — POTASSIUM CHLORIDE 40 MEQ: 1.5 POWDER, FOR SOLUTION ORAL at 12:27

## 2023-03-24 RX ADMIN — PHENOBARBITAL SODIUM 200 MG: 130 INJECTION INTRAMUSCULAR at 01:23

## 2023-03-24 RX ADMIN — PROPOFOL 40 MCG/KG/MIN: 10 INJECTION, EMULSION INTRAVENOUS at 14:41

## 2023-03-24 RX ADMIN — SODIUM CHLORIDE, POTASSIUM CHLORIDE, SODIUM LACTATE AND CALCIUM CHLORIDE 150 ML/HR: 600; 310; 30; 20 INJECTION, SOLUTION INTRAVENOUS at 00:17

## 2023-03-24 RX ADMIN — PHENOBARBITAL SODIUM 100 MG: 130 INJECTION INTRAMUSCULAR at 22:05

## 2023-03-24 RX ADMIN — PHENOBARBITAL SODIUM 100 MG: 130 INJECTION INTRAMUSCULAR at 16:36

## 2023-03-24 NOTE — NURSING NOTE
Patients spouse updated at this time. She is aware he is intubated and being sent to CCU room 419. Code word of ticughkeepsie set up with spouse.

## 2023-03-24 NOTE — NURSING NOTE
Patient has been restrained per Dr. Jackson. At this time humza vest present and 4 point BLE and BUE in restraints. House Supervisor Amanda at bedside at this time.

## 2023-03-24 NOTE — PLAN OF CARE
Problem: Adult Inpatient Plan of Care  Goal: Plan of Care Review  Outcome: Ongoing, Progressing  Flowsheets (Taken 3/24/2023 1040)  Plan of Care Reviewed With: other (see comments)   Goal Outcome Evaluation:  Plan of Care Reviewed With: other (see comments)           R equired intubation last night. 3/22/23 188#- #. Reported -205#. IV thiamine in place- propofol at 20.5 (+541cal). Noted last BM recorded 3/20. RN to check w/ MD re: initiating TF and possible Cortrak placement. Would recommend Peptamen AF to goal rate 50ml/hr and free water flush (+600ml) to provide 1440cal (+541cal propofol) = 1981cal, 91g pro and 1572ml (+600ml) = 1572ml/day. RD following.

## 2023-03-24 NOTE — PROGRESS NOTES
THC Physician - Brief Progress Note  PERMANENT  03/23/2023 23:57    [facility] - CCU/SD - 20 - [place], [place] ([facility])    JUAN A ACUÑA    Date of Service 03/23/2023 23:57    HPI/Events of Note [facility] Provider Assessment Note    Mr. Acuña is a 36 yo M who is in ICU for severe DTs and respiratory failure. He has a PMH of alcohol use, depression. He was transferred from [facility] on 3/22 due to withdrawal. From notes, it seems he consumed a large amount of   alcohol on Monday and woke up on Tuesday feeling unwell. (+) Nausea and vomiting with headache. Increasing vomiting. Dyspnea afterwards and thus presented to the hospital. Admitted to the hospital and has been managed for DT/withdrawal since then with   Ativan and associated medications.     Increased withdrawal symptoms and eventually he needed to be intubated for agitation/worsening DT. Now in ICU. Propofol drip ongoing. Ativan drip ordered and will be started soon. Phenobarb PO regimen ordered. CXR and ABG pending.    Video assessment done. Discussed with bedside staff. HR 82, /59, SpO2 100% on vent. Vent: 550/16/100/5.     Assessment and Plan:    1. Acute respiratory failure 2/2 severe DT  2. Alcohol withdrawal syndrome and DT  3. Alcohol abuse  4. Hypokalemia     Continue vent support. Check CXR and ABG post-intubation. Wean FiO2 and further adjustments based on response.     Continue Propofol and Ativan gtt for withdrawal symptom management. Preferentially would use Benzodiazepines over Propofol if able. Continue adjunct Phenobarb that is ongoing. Daily assessment for SAT when improved. SAT/SBT when clinically appropriate   but likely not overnight.    Vitamin supplementation. Replete electrolytes as needed. IVF.    Prophylaxis - Peridex, PPI ordered. SubQ Lovenox pre-existing.    __x___   Video Assessment performed  __x___   Most recent labs reviewed  __x___   Vital Signs  reviewed  __x___   Best Practices addressed:                 VTE prophylaxis: Y                 SUP (when indicated): Y                 Current Glucose: < 180                      Please notify bedside physician when present or [facility] if glc > 180 X 2                 Sepsis guidelines: N/A                 Lung protective strategy: Y                 Targeted Temperature Management: N/A    __x___     Spoke with bedside RN  __x___     Orders written    Contact [facility] for any needs if bedside physician is not present.      Interventions Major-Delirium, psychosis, severe agitation - evaluation and management, Respiratory failure - evaluation and management        Electronically Signed by: Bertrand Conteh) on 03/24/2023 00:00

## 2023-03-24 NOTE — PLAN OF CARE
Problem: Adult Inpatient Plan of Care  Goal: Optimal Comfort and Wellbeing  Outcome: Ongoing, Progressing     Problem: Fall Injury Risk  Goal: Absence of Fall and Fall-Related Injury  Outcome: Ongoing, Progressing  Intervention: Identify and Manage Contributors  Recent Flowsheet Documentation  Taken 3/24/2023 0400 by Senia Cowan RN  Medication Review/Management: medications reviewed  Taken 3/24/2023 0200 by Senia Cowan RN  Medication Review/Management: medications reviewed  Taken 3/24/2023 0000 by Senia Cowan RN  Medication Review/Management: medications reviewed  Taken 3/23/2023 2300 by Senia Cowan RN  Medication Review/Management: medications reviewed  Intervention: Promote Injury-Free Environment  Recent Flowsheet Documentation  Taken 3/24/2023 0500 by Senia Cowan RN  Safety Promotion/Fall Prevention: safety round/check completed  Taken 3/24/2023 0400 by Senia Cowan RN  Safety Promotion/Fall Prevention:   clutter free environment maintained   fall prevention program maintained   lighting adjusted   room organization consistent   safety round/check completed  Taken 3/24/2023 0300 by Senia Cowan RN  Safety Promotion/Fall Prevention: safety round/check completed  Taken 3/24/2023 0200 by Senia Cowan RN  Safety Promotion/Fall Prevention:   clutter free environment maintained   fall prevention program maintained   lighting adjusted   room organization consistent   safety round/check completed  Taken 3/24/2023 0100 by Senia Cowan RN  Safety Promotion/Fall Prevention: safety round/check completed  Taken 3/24/2023 0000 by Senia Cowan RN  Safety Promotion/Fall Prevention:   activity supervised   clutter free environment maintained   fall prevention program maintained   lighting adjusted   room organization consistent   safety round/check completed  Taken 3/23/2023 2300 by Senia Cowan RN  Safety Promotion/Fall Prevention:   activity  supervised   assistive device/personal items within reach   fall prevention program maintained   lighting adjusted   room organization consistent   safety round/check completed   Goal Outcome Evaluation:

## 2023-03-24 NOTE — PLAN OF CARE
Problem: Inability to Wean (Mechanical Ventilation, Invasive)  Goal: Mechanical Ventilation Liberation  Outcome: Ongoing, Progressing     Problem: Skin Injury Risk Increased  Goal: Skin Health and Integrity  Intervention: Optimize Skin Protection  Recent Flowsheet Documentation  Taken 3/24/2023 1319 by Lori Boone, MARY KATE  Head of Bed (HOB) Positioning: HOB elevated     Problem: Device-Related Complication Risk (Mechanical Ventilation, Invasive)  Goal: Optimal Device Function  Intervention: Optimize Device Care and Function  Recent Flowsheet Documentation  Taken 3/24/2023 1319 by Lori Boone, MARY KATE  Airway Safety Measures:   mask valve resuscitator at bedside   suction at bedside     Problem: Ventilator-Induced Lung Injury (Mechanical Ventilation, Invasive)  Goal: Absence of Ventilator-Induced Lung Injury  Intervention: Prevent Ventilator-Associated Pneumonia  Recent Flowsheet Documentation  Taken 3/24/2023 1319 by Lori Boone, MARY KATE  Head of Bed (HOB) Positioning: HOB elevated   Goal Outcome Evaluation:     Pt is still sedated and ventilated.  Pt will be observed and weaned as tolerated.

## 2023-03-24 NOTE — SIGNIFICANT NOTE
[facility] Medicine Services  INPATIENT PROGRESS NOTE    Length of Stay: 2  Date of Admission: 3/22/2023  Primary Care Physician: Brandon Darling MD    Subjective   Chief Complaint:   Severe agitation    HPI:    Unfortunately, this patient has become extremely agitated and is very aggressive to the staff.  He has now had to be put in restraints.  I was asked to assess him as a matter of urgency because of his acute psychomotor agitation and the fact that he is already on 2 mg of Ativan IV every 15 minutes.    Review of Systems   Unable to perform ROS: Mental status change   Constitutional: Positive for diaphoresis.   Psychiatric/Behavioral: Positive for behavioral problems, confusion and dysphoric mood. The patient is hyperactive.         All pertinent negatives and positives are as above. All other systems have been reviewed and are negative unless otherwise stated.     Objective    Temp:  [97.8 °F (36.6 °C)-98.9 °F (37.2 °C)] 98.7 °F (37.1 °C)  Heart Rate:  [57-95] 57  Resp:  [16-20] 16  BP: (106-142)/(55-90) 125/68  FiO2 (%):  [75 %] 75 %    Physical Exam  Vitals and nursing note reviewed.   Constitutional:       Appearance: He is ill-appearing, toxic-appearing and diaphoretic.      Comments: Hypervigilant and alert   HENT:      Mouth/Throat:      Mouth: Mucous membranes are dry.      Pharynx: Oropharynx is clear. No oropharyngeal exudate or posterior oropharyngeal erythema.   Eyes:      General: No visual field deficit or scleral icterus.        Right eye: No discharge.         Left eye: No discharge.      Extraocular Movements: Extraocular movements intact.      Conjunctiva/sclera: Conjunctivae normal.      Pupils: Pupils are equal, round, and reactive to light.   Skin:     Coloration: Skin is not jaundiced.      Findings: No rash.   Neurological:      General: No focal deficit present.      Mental Status: He is alert. He is confused.      GCS: GCS eye subscore is  4. GCS motor subscore is 6.      Cranial Nerves: Cranial nerves 2-12 are intact. No cranial nerve deficit, dysarthria or facial asymmetry.      Motor: Motor function is intact. No weakness, tremor or seizure activity.   Psychiatric:         Mood and Affect: Affect is labile and angry.         Behavior: Behavior is agitated, aggressive, hyperactive and combative.         Thought Content: Thought content is paranoid and delusional.         Cognition and Memory: Cognition is impaired.         Results Review:  I have reviewed the labs, radiology results, and diagnostic studies.    Laboratory Data:   Results from last 7 days   Lab Units 03/23/23  2345 03/23/23  0626 03/22/23  0345   SODIUM mmol/L 139 132* 137   POTASSIUM mmol/L 4.0  4.0 3.1* 3.5   CHLORIDE mmol/L 106 100 99   CO2 mmol/L 21.0* 22.0 28.0   BUN mg/dL 2* 2* 5*   CREATININE mg/dL 0.80 0.72* 0.81   GLUCOSE mg/dL 102* 105* 93   CALCIUM mg/dL 8.7 8.5* 8.5*   BILIRUBIN mg/dL 0.8  --  0.7   ALK PHOS U/L 101  --  117   ALT (SGPT) U/L 52*  --  66*   AST (SGOT) U/L 59*  --  50*   ANION GAP mmol/L 12.0 10.0 10.0     Estimated Creatinine Clearance: 154.4 mL/min (by C-G formula based on SCr of 0.8 mg/dL).  Results from last 7 days   Lab Units 03/24/23  0453 03/23/23 2345 03/23/23  0626   MAGNESIUM mg/dL 2.5 2.4 1.9         Results from last 7 days   Lab Units 03/23/23  2345 03/23/23  0626 03/22/23  0345   WBC 10*3/mm3 5.28 5.60 6.62   HEMOGLOBIN g/dL 13.0 13.3 14.5   HEMATOCRIT % 37.5 37.6 41.4   PLATELETS 10*3/mm3 183 182 233           Culture Data:   No results found for: BLOODCX  No results found for: URINECX  No results found for: RESPCX  No results found for: WOUNDCX  No results found for: STOOLCX  No components found for: BODYFLD    Radiology Data:   Imaging Results (Last 24 Hours)     Procedure Component Value Units Date/Time    XR Chest 1 View [[id number]] Collected: 03/23/23 2343     Updated: 03/24/23 0033    Narrative:      EXAMINATION: XR CHEST 1  VIEW    COMPARISON: Portable chest 7:29 AM the same day    INDICATION: Intubated    FINDINGS: Portable AP imaging of the chest is submitted - 1 view.  Endotracheal tube placement appears satisfactory. A nasogastric  tube is also in place terminating in the stomach. Heart size  remains within normal limits. Both lungs remain somewhat low  volume but appear clear, unchanged. No acute consolidation,  pleural effusion, pneumothorax or pulmonary edema is evident.       Impression:      ET tube and nasogastric tube placement appears  satisfactory. No acute findings.    Electronically signed by:  Calros Tobar MD  3/24/2023 12:31 AM  CDT Workstation: [id number]          I have reviewed the patient's current medications.     Assessment/Plan     Active Hospital Problems    Diagnosis    • **Alcohol withdrawal (HCC)    • Delirium tremens (HCC)    • Transaminitis        Medical Decision Making  Number and Complexity of problems: 3 - critical    Differential Diagnosis:   As above    Conditions and Status:        Condition is improving.     MDM Data  External documents reviewed: Nil  My EKG interpretation: N/A  My plain film interpretation: ET tube and OG in good position  Tests considered but not ordered: Nil      Decision rules/scores evaluated (example [id number], Wells, etc):   CIWA     Discussed with: The patient, by myself, and his wife was spoken to on the phone by the charge nurse, Belén     Treatment Plan  1.  We will intubate the patient as we are now at a point where the high doses of sedatives place the patient at significant risk for respiratory depression  2.  To provide immediate sedation I have ordered 10 mg of Haldol and 4 mg of Ativan.  We can additionally provide doses of Ativan to clinical effect which will permit us to care for the patient with a greater deal of personal safety  3.  We will transfer the patient to the ICU for further ongoing care with IV infusions of sedatives titrated to clinical  effect  4.  I have already spoken to the pharmacy about utilizing Precedex as an adjunctive therapy in this particular case and they have agreed as and when we need it      Care Planning  Shared decision making: With the patient and his wife  Code status and discussions: Full code    Disposition  Social Determinants of Health that impact treatment or disposition: Nil  I expect the patient to be discharged but at this stage due to his critical condition and significant acute deterioration a timeframe cannot be placed on this.           Harshal Jackson MD    Electronically signed by Harshal Jackson MD, 03/24/23, 7:17 AM CDT.

## 2023-03-24 NOTE — CONSULTS
"Inpatient Consult to Psychiatry    3/24/2023    Referring Provider: KINGSLEY Hinds  Reason for Consultation: alcohol withdrawal    Source of History:  chart review and unobtainable from patient due to mental status    HPI:    Patient is a 37 y.o. male with past medical history significant for long history of heavy ETOH use, Depression or Affective disorder with prior trials of antipsychotics and mood stabilizers, THC use, mitral valve prolapse, and HTN transferred from [facility] due to alcohol withdrawal related symptoms.    Patient has been drinking at least a first of whiskey nightly per chart review.  Guzman WYNN noted yesterday:   During today's visit, patient reports feeling slightly better aside from anxiety and tearfulness.  He reports having hallucinations overnight.  He requests to see psychiatry as he states he is serious about attempting alcohol cessation and reports that he believes the alcohol cravings are related to his depression related to \"tough childhood.\"  Patient did not elaborate on his childhood but asks to speak to a counselor or specialist regarding his depression related to it.     Patient became agitated last night and was put into restraints and required intubation due to his aggression.    Psychiatric Review Of Systems:  alcohol withdrawal and Pertinent items are noted in HPI.    History  Past psychiatric history:    Psychiatric Hospitalizations: patient is intubated and unable to provide history and none found in chart review.    Suicide Attempts: patient is intubated and unable to provide history and none found in chart review.    Prior Treatment and Medications Tried: Per chart review: buspar, vistaril, tegretol, zyprexa, vraylar.  None on PTA med list.    History of violence or legal issues: patient is intubated and unable to provide history and none found in chart review.    Social History:    Substance Abuse:  Patient's UDS is positive for benzo, barbs and THC.  " "Patient endorses THC and ETOH per chart review.  He started drinking in his teens per chart review.  He has a history of heavy daily use per chart review.    Current Relationships:  per chart    Abuse/Trauma: patient is intubated and unable to provide history.  KINGSLEY Hinds noted yesterday: \"depression related to \"tough childhood.\"  Patient did not elaborate on his childhood but asks to speak to a counselor or specialist regarding his depression related to it.\"    Education: patient is intubated and unable to provide history and not found in chart review.  Occupation: patient is intubated and unable to provide history and not found in chart review.  Living Situation: patient is intubated and unable to provide history and not found in chart review.    Firearms Access: patient is intubated and unable to provide history and not found in chart review.    Social History     Socioeconomic History   • Marital status:    Tobacco Use   • Smoking status: Every Day     Packs/day: 2.00     Years: 20.00     Pack years: 40.00     Types: Cigarettes   • Smokeless tobacco: Never   Vaping Use   • Vaping Use: Former   • Substances: THC   • Devices: Disposable   Substance and Sexual Activity   • Alcohol use: Yes     Alcohol/week: 1.0 standard drink     Types: 1 Shots of liquor per week     Comment: every day   • Drug use: Yes     Frequency: 28.0 times per week     Types: Marijuana   • Sexual activity: Yes     Partners: Female         Family History:    Family History   Problem Relation Age of Onset   • No Known Problems Mother    • No Known Problems Father        Further details: patient is intubated and unable to provide history    Past Medical and Surgical History:    Past Medical History:   Diagnosis Date   • Cat scratch fever    • Depression    • Ganglion cyst of wrist, left    • Hypertension    • Mitral valve prolapse    • Mitral valve prolapse    • Substance abuse (HCC)    • Type 2 HSV infection of penis      Past " Surgical History:   Procedure Laterality Date   • ANKLE SURGERY Right    • CYST REMOVAL Left     left wrist   • LYMPHADENECTOMY     • MANDIBLE FRACTURE SURGERY       Allergies:  Penicillins and Ibuprofen  Medications Prior to Admission   Medication Sig Dispense Refill Last Dose   • atenolol (TENORMIN) 100 MG tablet Take 1 tablet by mouth Daily. 30 tablet 2 Unknown       Medical Review Of Systems:  Review of systems not obtained due to patient factors.  Patient is intubated and unable to cooperate.    Objective     Vital Signs    Temp:  [98 °F (36.7 °C)-98.9 °F (37.2 °C)] 98.3 °F (36.8 °C)  Heart Rate:  [57-95] 61  Resp:  [16-20] 16  BP: (106-179)/() 135/73  FiO2 (%):  [30 %-75 %] 30 %    Physical Exam:   General Appearance: intubated and sedated,  Hygiene:   fair  Gait & Station: in bed  Musculoskeletal: No tremors or abnormal involuntary movements    Mental Status Exam:   Cooperation:  intubated and sedated  Eye Contact:  Closed  Psychomotor Behavior:  intubated and sedated  Mood: intubated and non-verbal  Affect:  normal and intubated and sedated  Speech:  intubated and non-verbal  Thought Process:  intubated and non-verbal  Associations: intubated and non-verbal  Thought Content:     intubated and non-verbal   Suicidal:  intubated and non-verbal   Homicidal:  intubated and non-verbal   Hallucinations:  intubated and non-verbal   Delusion:  Unable to demonstrate  Cognitive Functioning:   Consciousness: intubated and non-verbal   Orientation:  Unable to evaluate   Attention: intubated and non-verbal Concentration: intubated and non-verbal   Language:  intubated and non-verbal Vocabulary: intubated and non-verbal   Short Term Memory: Unable to evaluate   Long Term Memory: Unable to evaluate   Fund of Knowledge: intubated and non-verbal  Reliability:  intubated and unable to evaluate  Insight:  intubated and unable to evaluate  Judgement:  intubated and unable to evaluate  Impulse Control:  intubated and unable  to evaluate    Diagnostic Data: Results source: EMR     Lab Results: Results source: EMR   Recent Results (from the past 72 hour(s))   Comprehensive Metabolic Panel    Collection Time: 03/22/23  3:45 AM    Specimen: Blood   Result Value Ref Range    Glucose 93 65 - 99 mg/dL    BUN 5 (L) 6 - 20 mg/dL    Creatinine 0.81 0.76 - 1.27 mg/dL    Sodium 137 136 - 145 mmol/L    Potassium 3.5 3.5 - 5.2 mmol/L    Chloride 99 98 - 107 mmol/L    CO2 28.0 22.0 - 29.0 mmol/L    Calcium 8.5 (L) 8.6 - 10.5 mg/dL    Total Protein 6.5 6.0 - 8.5 g/dL    Albumin 4.1 3.5 - 5.2 g/dL    ALT (SGPT) 66 (H) 1 - 41 U/L    AST (SGOT) 50 (H) 1 - 40 U/L    Alkaline Phosphatase 117 39 - 117 U/L    Total Bilirubin 0.7 0.0 - 1.2 mg/dL    Globulin 2.4 gm/dL    A/G Ratio 1.7 g/dL    BUN/Creatinine Ratio 6.2 (L) 7.0 - 25.0    Anion Gap 10.0 5.0 - 15.0 mmol/L    eGFR 117.2 >60.0 mL/min/1.73   Ethanol    Collection Time: 03/22/23  3:45 AM    Specimen: Blood   Result Value Ref Range    Ethanol <10 0 - 10 mg/dL    Ethanol % <0.010 %   Acetaminophen Level    Collection Time: 03/22/23  3:45 AM    Specimen: Blood   Result Value Ref Range    Acetaminophen <5.0 0.0 - 30.0 mcg/mL   Salicylate Level    Collection Time: 03/22/23  3:45 AM    Specimen: Blood   Result Value Ref Range    Salicylate <0.3 <=30.0 mg/dL   CBC Auto Differential    Collection Time: 03/22/23  3:45 AM    Specimen: Blood   Result Value Ref Range    WBC 6.62 3.40 - 10.80 10*3/mm3    RBC 4.24 4.14 - 5.80 10*6/mm3    Hemoglobin 14.5 13.0 - 17.7 g/dL    Hematocrit 41.4 37.5 - 51.0 %    MCV 97.6 (H) 79.0 - 97.0 fL    MCH 34.2 (H) 26.6 - 33.0 pg    MCHC 35.0 31.5 - 35.7 g/dL    RDW 13.8 12.3 - 15.4 %    RDW-SD 49.2 37.0 - 54.0 fl    MPV 9.7 6.0 - 12.0 fL    Platelets 233 140 - 450 10*3/mm3    Neutrophil % 70.8 42.7 - 76.0 %    Lymphocyte % 16.6 (L) 19.6 - 45.3 %    Monocyte % 10.9 5.0 - 12.0 %    Eosinophil % 0.8 0.3 - 6.2 %    Basophil % 0.6 0.0 - 1.5 %    Immature Grans % 0.3 0.0 - 0.5 %     Neutrophils, Absolute 4.69 1.70 - 7.00 10*3/mm3    Lymphocytes, Absolute 1.10 0.70 - 3.10 10*3/mm3    Monocytes, Absolute 0.72 0.10 - 0.90 10*3/mm3    Eosinophils, Absolute 0.05 0.00 - 0.40 10*3/mm3    Basophils, Absolute 0.04 0.00 - 0.20 10*3/mm3    Immature Grans, Absolute 0.02 0.00 - 0.05 10*3/mm3    nRBC 0.0 0.0 - 0.2 /100 WBC   High Sensitivity Troponin T    Collection Time: 03/22/23  3:45 AM    Specimen: Blood   Result Value Ref Range    HS Troponin T 7 <15 ng/L   ECG 12 Lead Dyspnea    Collection Time: 03/22/23  6:48 AM   Result Value Ref Range    QT Interval 386 ms    QTC Interval 453 ms   High Sensitivity Troponin T 2Hr    Collection Time: 03/22/23  7:26 AM    Specimen: Blood   Result Value Ref Range    HS Troponin T 7 <15 ng/L    Troponin T Delta 0 >=-4 - <+4 ng/L   Urine Drug Screen - Urine, Clean Catch    Collection Time: 03/22/23  8:43 AM    Specimen: Urine, Clean Catch   Result Value Ref Range    THC, Screen, Urine Positive (A) Negative    Phencyclidine (PCP), Urine Negative Negative    Cocaine Screen, Urine Negative Negative    Methamphetamine, Ur Negative Negative    Opiate Screen Negative Negative    Amphetamine Screen, Urine Negative Negative    Benzodiazepine Screen, Urine Positive (A) Negative    Tricyclic Antidepressants Screen Negative Negative    Methadone Screen, Urine Negative Negative    Barbiturates Screen, Urine Positive (A) Negative    Oxycodone Screen, Urine Negative Negative    Propoxyphene Screen Negative Negative    Buprenorphine, Screen, Urine Negative Negative   Basic Metabolic Panel    Collection Time: 03/23/23  6:26 AM    Specimen: Blood   Result Value Ref Range    Glucose 105 (H) 65 - 99 mg/dL    BUN 2 (L) 6 - 20 mg/dL    Creatinine 0.72 (L) 0.76 - 1.27 mg/dL    Sodium 132 (L) 136 - 145 mmol/L    Potassium 3.1 (L) 3.5 - 5.2 mmol/L    Chloride 100 98 - 107 mmol/L    CO2 22.0 22.0 - 29.0 mmol/L    Calcium 8.5 (L) 8.6 - 10.5 mg/dL    BUN/Creatinine Ratio 2.8 (L) 7.0 - 25.0    Anion  Gap 10.0 5.0 - 15.0 mmol/L    eGFR 121.4 >60.0 mL/min/1.73   Magnesium    Collection Time: 03/23/23  6:26 AM    Specimen: Blood   Result Value Ref Range    Magnesium 1.9 1.6 - 2.6 mg/dL   CBC Auto Differential    Collection Time: 03/23/23  6:26 AM    Specimen: Blood   Result Value Ref Range    WBC 5.60 3.40 - 10.80 10*3/mm3    RBC 3.88 (L) 4.14 - 5.80 10*6/mm3    Hemoglobin 13.3 13.0 - 17.7 g/dL    Hematocrit 37.6 37.5 - 51.0 %    MCV 96.9 79.0 - 97.0 fL    MCH 34.3 (H) 26.6 - 33.0 pg    MCHC 35.4 31.5 - 35.7 g/dL    RDW 13.5 12.3 - 15.4 %    RDW-SD 48.3 37.0 - 54.0 fl    MPV 10.1 6.0 - 12.0 fL    Platelets 182 140 - 450 10*3/mm3    Neutrophil % 61.6 42.7 - 76.0 %    Lymphocyte % 24.3 19.6 - 45.3 %    Monocyte % 10.9 5.0 - 12.0 %    Eosinophil % 2.1 0.3 - 6.2 %    Basophil % 0.9 0.0 - 1.5 %    Immature Grans % 0.2 0.0 - 0.5 %    Neutrophils, Absolute 3.45 1.70 - 7.00 10*3/mm3    Lymphocytes, Absolute 1.36 0.70 - 3.10 10*3/mm3    Monocytes, Absolute 0.61 0.10 - 0.90 10*3/mm3    Eosinophils, Absolute 0.12 0.00 - 0.40 10*3/mm3    Basophils, Absolute 0.05 0.00 - 0.20 10*3/mm3    Immature Grans, Absolute 0.01 0.00 - 0.05 10*3/mm3    nRBC 0.0 0.0 - 0.2 /100 WBC   Potassium    Collection Time: 03/23/23 11:45 PM    Specimen: Blood   Result Value Ref Range    Potassium 4.0 3.5 - 5.2 mmol/L   Hepatitis B Surface Antigen    Collection Time: 03/23/23 11:45 PM    Specimen: Blood   Result Value Ref Range    Hepatitis B Surface Ag Non-Reactive Non-Reactive   Hepatitis B Surface Antibody    Collection Time: 03/23/23 11:45 PM    Specimen: Blood   Result Value Ref Range    Hep B S Ab Non-Reactive Non-Reactive   Comprehensive Metabolic Panel    Collection Time: 03/23/23 11:45 PM    Specimen: Blood   Result Value Ref Range    Glucose 102 (H) 65 - 99 mg/dL    BUN 2 (L) 6 - 20 mg/dL    Creatinine 0.80 0.76 - 1.27 mg/dL    Sodium 139 136 - 145 mmol/L    Potassium 4.0 3.5 - 5.2 mmol/L    Chloride 106 98 - 107 mmol/L    CO2 21.0 (L) 22.0 -  29.0 mmol/L    Calcium 8.7 8.6 - 10.5 mg/dL    Total Protein 6.2 6.0 - 8.5 g/dL    Albumin 3.8 3.5 - 5.2 g/dL    ALT (SGPT) 52 (H) 1 - 41 U/L    AST (SGOT) 59 (H) 1 - 40 U/L    Alkaline Phosphatase 101 39 - 117 U/L    Total Bilirubin 0.8 0.0 - 1.2 mg/dL    Globulin 2.4 gm/dL    A/G Ratio 1.6 g/dL    BUN/Creatinine Ratio 2.5 (L) 7.0 - 25.0    Anion Gap 12.0 5.0 - 15.0 mmol/L    eGFR 117.6 >60.0 mL/min/1.73   CBC (No Diff)    Collection Time: 03/23/23 11:45 PM    Specimen: Blood   Result Value Ref Range    WBC 5.28 3.40 - 10.80 10*3/mm3    RBC 3.78 (L) 4.14 - 5.80 10*6/mm3    Hemoglobin 13.0 13.0 - 17.7 g/dL    Hematocrit 37.5 37.5 - 51.0 %    MCV 99.2 (H) 79.0 - 97.0 fL    MCH 34.4 (H) 26.6 - 33.0 pg    MCHC 34.7 31.5 - 35.7 g/dL    RDW 13.6 12.3 - 15.4 %    RDW-SD 49.9 37.0 - 54.0 fl    MPV 10.3 6.0 - 12.0 fL    Platelets 183 140 - 450 10*3/mm3   Magnesium    Collection Time: 03/23/23 11:45 PM    Specimen: Blood   Result Value Ref Range    Magnesium 2.4 1.6 - 2.6 mg/dL   Hepatitis C Antibody    Collection Time: 03/23/23 11:49 PM    Specimen: Blood   Result Value Ref Range    Hepatitis C Ab Non-Reactive Non-Reactive   HIV-1 / O / 2 Ag / Antibody 4th Generation    Collection Time: 03/23/23 11:49 PM    Specimen: Blood   Result Value Ref Range    HIV-1/ HIV-2 Non-Reactive Non-Reactive   POC Glucose Once    Collection Time: 03/24/23 12:17 AM    Specimen: Blood   Result Value Ref Range    Glucose 103 70 - 130 mg/dL   Blood Gas, Arterial -    Collection Time: 03/24/23  1:14 AM    Specimen: Arterial Blood   Result Value Ref Range    Site Left Brachial     Kush's Test N/A     pH, Arterial 7.401 7.350 - 7.450 pH units    pCO2, Arterial 41.1 35.0 - 45.0 mm Hg    pO2, Arterial 330.0 (H) 83.0 - 108.0 mm Hg    HCO3, Arterial 25.5 20.0 - 26.0 mmol/L    Base Excess, Arterial 0.6 0.0 - 2.0 mmol/L    O2 Saturation, Arterial >100.0 (H) 94.0 - 99.0 %    Barometric Pressure for Blood Gas 744 mmHg    Modality Ventilator     FIO2 75 %     Ventilator Mode AC     Set Tidal Volume 550     Set [facility] Resp Rate 16.0     PEEP 5.0     Collected by Anival    Magnesium    Collection Time: 03/24/23  4:53 AM    Specimen: Blood   Result Value Ref Range    Magnesium 2.5 1.6 - 2.6 mg/dL   Lavender Top    Collection Time: 03/24/23  4:53 AM   Result Value Ref Range    Extra Tube hold for add-on    Comprehensive Metabolic Panel    Collection Time: 03/24/23 10:06 AM    Specimen: Blood   Result Value Ref Range    Glucose 85 65 - 99 mg/dL    BUN 2 (L) 6 - 20 mg/dL    Creatinine 0.63 (L) 0.76 - 1.27 mg/dL    Sodium 139 136 - 145 mmol/L    Potassium 3.5 3.5 - 5.2 mmol/L    Chloride 106 98 - 107 mmol/L    CO2 23.0 22.0 - 29.0 mmol/L    Calcium 8.5 (L) 8.6 - 10.5 mg/dL    Total Protein 5.9 (L) 6.0 - 8.5 g/dL    Albumin 3.6 3.5 - 5.2 g/dL    ALT (SGPT) 55 (H) 1 - 41 U/L    AST (SGOT) 75 (H) 1 - 40 U/L    Alkaline Phosphatase 97 39 - 117 U/L    Total Bilirubin 0.7 0.0 - 1.2 mg/dL    Globulin 2.3 gm/dL    A/G Ratio 1.6 g/dL    BUN/Creatinine Ratio 3.2 (L) 7.0 - 25.0    Anion Gap 10.0 5.0 - 15.0 mmol/L    eGFR 126.4 >60.0 mL/min/1.73   CBC Auto Differential    Collection Time: 03/24/23 10:06 AM    Specimen: Blood   Result Value Ref Range    WBC 5.90 3.40 - 10.80 10*3/mm3    RBC 3.62 (L) 4.14 - 5.80 10*6/mm3    Hemoglobin 12.4 (L) 13.0 - 17.7 g/dL    Hematocrit 36.1 (L) 37.5 - 51.0 %    MCV 99.7 (H) 79.0 - 97.0 fL    MCH 34.3 (H) 26.6 - 33.0 pg    MCHC 34.3 31.5 - 35.7 g/dL    RDW 13.9 12.3 - 15.4 %    RDW-SD 50.6 37.0 - 54.0 fl    MPV 10.0 6.0 - 12.0 fL    Platelets 158 140 - 450 10*3/mm3    Neutrophil % 67.5 42.7 - 76.0 %    Lymphocyte % 20.0 19.6 - 45.3 %    Monocyte % 9.7 5.0 - 12.0 %    Eosinophil % 1.7 0.3 - 6.2 %    Basophil % 0.8 0.0 - 1.5 %    Immature Grans % 0.3 0.0 - 0.5 %    Neutrophils, Absolute 3.98 1.70 - 7.00 10*3/mm3    Lymphocytes, Absolute 1.18 0.70 - 3.10 10*3/mm3    Monocytes, Absolute 0.57 0.10 - 0.90 10*3/mm3    Eosinophils, Absolute 0.10 0.00 - 0.40  10*3/mm3    Basophils, Absolute 0.05 0.00 - 0.20 10*3/mm3    Immature Grans, Absolute 0.02 0.00 - 0.05 10*3/mm3    nRBC 0.0 0.0 - 0.2 /100 WBC       No results found for: GLUF     No results found for: HGBA1C    No results found for: CHOL, TRIG, HDL, LDL, VLDL, LDLHDL     No results found for: TSH    No results found for: FREET4    No results found for: TBUS86VS, GNKLXOAM22, FOLATE    No results found for: HCGQUAL    Imaging Results:  XR Chest 1 View    Result Date: 3/24/2023  Narrative: EXAMINATION: XR CHEST 1 VIEW COMPARISON: Portable chest 7:29 AM the same day INDICATION: Intubated FINDINGS: Portable AP imaging of the chest is submitted - 1 view. Endotracheal tube placement appears satisfactory. A nasogastric tube is also in place terminating in the stomach. Heart size remains within normal limits. Both lungs remain somewhat low volume but appear clear, unchanged. No acute consolidation, pleural effusion, pneumothorax or pulmonary edema is evident.     Impression: ET tube and nasogastric tube placement appears satisfactory. No acute findings. Electronically signed by:  Carlos Tobar MD  3/24/2023 12:31 AM CDT Workstation: [id number]    XR Chest 1 View    Result Date: 3/22/2023  Narrative: Chest x-ray single view. CLINICAL INDICATION: Shortness of breath . Dyspnea COMPARISON: None FINDINGS: Cardiac silhouette is normal in size. Pulmonary vascularity is unremarkable. No focal infiltrate or consolidation.  No pleural effusion.  No pneumothorax.     Impression: No evidence of active disease. Electronically signed by:  Robin Varghese MD  3/22/2023 4:10 PM CDT Workstation: [id number]    XR Abdomen KUB    Result Date: 3/24/2023  Narrative: Procedure: Supine abdomen Reason for exam: Cortrak placement FINDINGS: Limited supine abdomen view submitted to evaluate feeding tube positioning. The feeding tube is seen with tip within the region of the gastric antrum.     Impression: The feeding tube is seen with tip within the region  of the gastric antrum. Electronically signed by:  Willem Harris MD  3/24/2023 11:42 AM CDT Workstation: [id number]      Assessment & Plan       Alcohol withdrawal (HCC)    Transaminitis    Delirium tremens (HCC)    Anxiety disorder    Affective disorder (HCC)      Recommendations:    Alcohol Withdrawal/Delirium Tremens:   --Cont Phenobart 100mg tid for rest of today then start taper.   --Cont CIIWA and PRN ativan   --Cont Ativan drip currently at 5mg/hr.  Recommend decrease by 1mg per day.   --Start ativan 4mg q1hrs PRN for agitation and autonomic instability.      Anxiety & Affective Disorder:   --Will consider psychotherapeutic agents after extubation and management of the detox.    Ability and Capacity to Respond to Treatment: fair given long history of ETOH use and poor control of mental health issues.    Thank you for allowing me to participate in the care of this patient.  Please contact me with any further questions or concerns.    Tanya Perez MD  03/24/23  13:59 CDT

## 2023-03-24 NOTE — PROGRESS NOTES
"Adult Nutrition  Assessment    Patient Name:  Jackson Acuña  YOB: 1985  MRN: [id number]  Admit Date:  3/22/2023    Assessment Date:  3/24/2023    Comments:  Pt noted to have worsening withdrawals and required intubation last night. 3/22/23 188#- #. Reported -205#. IV thiamine in place- propofol at 20.5 (+541cal). Noted last BM recorded 3/20. RN to check w/ MD re: initiating TF and possible Cortrak placement. Would recommend Peptamen AF to goal rate 50ml/hr and free water flush (+600ml) to provide 1440cal (+541cal propofol) = 1981cal, 91g pro and 1572ml (+600ml) = 1572ml/day. RD to follow hospital course.       Estimated/Assessed Needs - Anthropometrics     Row Name 03/24/23 0600          Anthropometrics    Height 182.9 cm (72\")     Weight 85.5 kg (188 lb 7.9 oz)                       Electronically signed by:  Verito Clay RD  03/24/23 10:17 CDT  "

## 2023-03-24 NOTE — PROGRESS NOTES
[facility] Medicine Services  INPATIENT PROGRESS NOTE    Length of Stay: 2  Date of Admission: 3/22/2023  Primary Care Physician: Brandon Darling MD    Subjective   Chief Complaint: Alcohol withdrawal  HPI:    He is in the ICU intubated and sedated due to alcohol withdrawal.  Continues with propofol and Ativan drip.    Review of Systems   Unable to perform ROS: Intubated        All pertinent negatives and positives are as above. All other systems have been reviewed and are negative unless otherwise stated.     Objective    Temp:  [98 °F (36.7 °C)-98.9 °F (37.2 °C)] 98.3 °F (36.8 °C)  Heart Rate:  [57-95] 61  Resp:  [16-20] 16  BP: (106-179)/() 135/73  FiO2 (%):  [30 %-75 %] 30 %  Physical Exam  Vitals and nursing note reviewed.   Constitutional:       General: He is not in acute distress.     Appearance: He is well-developed. He is ill-appearing. He is not diaphoretic.      Interventions: He is intubated.   HENT:      Head: Normocephalic and atraumatic.   Cardiovascular:      Rate and Rhythm: Normal rate.   Pulmonary:      Effort: Pulmonary effort is normal. No respiratory distress. He is intubated.      Breath sounds: No wheezing.   Abdominal:      General: There is no distension.      Palpations: Abdomen is soft.   Musculoskeletal:         General: Normal range of motion.   Skin:     General: Skin is warm and dry.   Neurological:      Cranial Nerves: No cranial nerve deficit.             Results Review:  I have reviewed the labs, radiology results, and diagnostic studies.    Laboratory Data:   Lab Results (last 24 hours)     Procedure Component Value Units Date/Time    Hepatitis B Surface Antibody [[id number]]  (Normal) Collected: 03/23/23 2345    Specimen: Blood Updated: 03/24/23 1245     Hep B S Ab Non-Reactive    Narrative:      Non-Reactive - Individual is considered to be not immune to infection with HBV.    Equivocal - Unable to determine if anti-HBs is  present at levels consistent with immunity. The individual should be further assessed by associated risk factors and the use of additional diagnositc information, or another sample may be collected and tested.    Reactive - Anti-HBs concentration detected at >10 mIU/mL. Individual is considered to be immune to infection with HBV.      Results may be falsely decreased if patient taking Biotin.      Comprehensive Metabolic Panel [[id number]]  (Abnormal) Collected: 03/24/23 1006    Specimen: Blood Updated: 03/24/23 1028     Glucose 85 mg/dL      BUN 2 mg/dL      Creatinine 0.63 mg/dL      Sodium 139 mmol/L      Potassium 3.5 mmol/L      Chloride 106 mmol/L      CO2 23.0 mmol/L      Calcium 8.5 mg/dL      Total Protein 5.9 g/dL      Albumin 3.6 g/dL      ALT (SGPT) 55 U/L      AST (SGOT) 75 U/L      Alkaline Phosphatase 97 U/L      Total Bilirubin 0.7 mg/dL      Globulin 2.3 gm/dL      A/G Ratio 1.6 g/dL      BUN/Creatinine Ratio 3.2     Anion Gap 10.0 mmol/L      eGFR 126.4 mL/min/1.73     Narrative:      GFR Normal >60  Chronic Kidney Disease <60  Kidney Failure <15      CBC & Differential [[id number]]  (Abnormal) Collected: 03/24/23 1006    Specimen: Blood Updated: 03/24/23 1011    Narrative:      The following orders were created for panel order CBC & Differential.  Procedure                               Abnormality         Status                     ---------                               -----------         ------                     CBC Auto Differential[[id number]]        Abnormal            Final result                 Please view results for these tests on the individual orders.    CBC Auto Differential [[id number]]  (Abnormal) Collected: 03/24/23 1006    Specimen: Blood Updated: 03/24/23 1011     WBC 5.90 10*3/mm3      RBC 3.62 10*6/mm3      Hemoglobin 12.4 g/dL      Hematocrit 36.1 %      MCV 99.7 fL      MCH 34.3 pg      MCHC 34.3 g/dL      RDW 13.9 %      RDW-SD 50.6 fl      MPV 10.0 fL      Platelets 158  10*3/mm3      Neutrophil % 67.5 %      Lymphocyte % 20.0 %      Monocyte % 9.7 %      Eosinophil % 1.7 %      Basophil % 0.8 %      Immature Grans % 0.3 %      Neutrophils, Absolute 3.98 10*3/mm3      Lymphocytes, Absolute 1.18 10*3/mm3      Monocytes, Absolute 0.57 10*3/mm3      Eosinophils, Absolute 0.10 10*3/mm3      Basophils, Absolute 0.05 10*3/mm3      Immature Grans, Absolute 0.02 10*3/mm3      nRBC 0.0 /100 WBC     Extra Tubes [[id number]] Collected: 03/24/23 0453    Specimen: Blood, Venous Line Updated: 03/24/23 0601    Narrative:      The following orders were created for panel order Extra Tubes.  Procedure                               Abnormality         Status                     ---------                               -----------         ------                     Lavender Top[[id number]]                                     Final result                 Please view results for these tests on the individual orders.    Lavender Top [[id number]] Collected: 03/24/23 0453    Specimen: Blood Updated: 03/24/23 0601     Extra Tube hold for add-on     Comment: Auto resulted       Magnesium [[id number]]  (Normal) Collected: 03/24/23 0453    Specimen: Blood Updated: 03/24/23 0551     Magnesium 2.5 mg/dL     Hepatitis C Antibody [[id number]]  (Normal) Collected: 03/23/23 2349    Specimen: Blood Updated: 03/24/23 0336     Hepatitis C Ab Non-Reactive    Narrative:      Results may be falsely decreased if patient taking Biotin.      HIV-1 / O / 2 Ag / Antibody 4th Generation [[id number]]  (Normal) Collected: 03/23/23 2349    Specimen: Blood Updated: 03/24/23 0336     HIV-1/ HIV-2 Non-Reactive    Narrative:      The HIV antibody/antigen combo assay is a qualitative assay for HIV that includes the p24 antigen as well as antibodies to HIV types 1 and 2. This test is intended to be used as a screening assay in the diagnosis of HIV infection in patients over the age of 2.    Hepatitis B Surface Antigen [[id number]]  (Normal)  Collected: 03/23/23 2345    Specimen: Blood Updated: 03/24/23 0326     Hepatitis B Surface Ag Non-Reactive    Magnesium [[id number]]  (Normal) Collected: 03/23/23 2345    Specimen: Blood Updated: 03/24/23 0120     Magnesium 2.4 mg/dL     Blood Gas, Arterial - [[id number]]  (Abnormal) Collected: 03/24/23 0114    Specimen: Arterial Blood Updated: 03/24/23 0115     Site Left Brachial     Kush's Test N/A     pH, Arterial 7.401 pH units      pCO2, Arterial 41.1 mm Hg      pO2, Arterial 330.0 mm Hg      Comment: 83 Value above reference range        HCO3, Arterial 25.5 mmol/L      Base Excess, Arterial 0.6 mmol/L      O2 Saturation, Arterial >100.0 %      Comment: 93 Value above reportable range > 100.0        Barometric Pressure for Blood Gas 744 mmHg      Modality Ventilator     FIO2 75 %      Ventilator Mode AC     Set Tidal Volume 550     Set Mech Resp Rate 16.0     PEEP 5.0     Collected by Anival     Comment: Meter: [id number]     :  [id number]       POC Glucose Once [[id number]]  (Normal) Collected: 03/24/23 0017    Specimen: Blood Updated: 03/24/23 0044     Glucose 103 mg/dL      Comment: RN NotifiedOperator: [id number] YFN AMBERMeter [id number]: [id number]       Comprehensive Metabolic Panel [[id number]]  (Abnormal) Collected: 03/23/23 2345    Specimen: Blood Updated: 03/24/23 0017     Glucose 102 mg/dL      BUN 2 mg/dL      Creatinine 0.80 mg/dL      Sodium 139 mmol/L      Potassium 4.0 mmol/L      Comment: Slight hemolysis detected by analyzer. Results may be affected.        Chloride 106 mmol/L      CO2 21.0 mmol/L      Calcium 8.7 mg/dL      Total Protein 6.2 g/dL      Albumin 3.8 g/dL      ALT (SGPT) 52 U/L      AST (SGOT) 59 U/L      Alkaline Phosphatase 101 U/L      Total Bilirubin 0.8 mg/dL      Globulin 2.4 gm/dL      A/G Ratio 1.6 g/dL      BUN/Creatinine Ratio 2.5     Anion Gap 12.0 mmol/L      eGFR 117.6 mL/min/1.73     Narrative:      GFR Normal >60  Chronic Kidney Disease <60  Kidney  Failure <15      Potassium [[id number]]  (Normal) Collected: 03/23/23 2345    Specimen: Blood Updated: 03/24/23 0017     Potassium 4.0 mmol/L      Comment: Slight hemolysis detected by analyzer. Results may be affected.       CBC (No Diff) [[id number]]  (Abnormal) Collected: 03/23/23 2345    Specimen: Blood Updated: 03/23/23 2352     WBC 5.28 10*3/mm3      RBC 3.78 10*6/mm3      Hemoglobin 13.0 g/dL      Hematocrit 37.5 %      MCV 99.2 fL      MCH 34.4 pg      MCHC 34.7 g/dL      RDW 13.6 %      RDW-SD 49.9 fl      MPV 10.3 fL      Platelets 183 10*3/mm3           Culture Data:   No results found for: BLOODCX  No results found for: URINECX  No results found for: RESPCX  No results found for: WOUNDCX  No results found for: STOOLCX  No components found for: BODYFLD    Radiology Data:   Imaging Results (Last 24 Hours)     Procedure Component Value Units Date/Time    XR Abdomen KUB [[id number]] Collected: 03/24/23 1046     Updated: 03/24/23 1145    Narrative:      Procedure: Supine abdomen    Reason for exam: Cortrak placement    FINDINGS: Limited supine abdomen view submitted to evaluate  feeding tube positioning. The feeding tube is seen with tip  within the region of the gastric antrum.      Impression:      The feeding tube is seen with tip within the region of  the gastric antrum.    Electronically signed by:  Willem Harris MD  3/24/2023 11:42 AM CDT  Workstation: [id number]    XR Chest 1 View [[id number]] Collected: 03/23/23 2343     Updated: 03/24/23 0033    Narrative:      EXAMINATION: XR CHEST 1 VIEW    COMPARISON: Portable chest 7:29 AM the same day    INDICATION: Intubated    FINDINGS: Portable AP imaging of the chest is submitted - 1 view.  Endotracheal tube placement appears satisfactory. A nasogastric  tube is also in place terminating in the stomach. Heart size  remains within normal limits. Both lungs remain somewhat low  volume but appear clear, unchanged. No acute consolidation,  pleural effusion,  pneumothorax or pulmonary edema is evident.       Impression:      ET tube and nasogastric tube placement appears  satisfactory. No acute findings.    Electronically signed by:  Carlos Tobar MD  3/24/2023 12:31 AM  CDT Workstation: [id number]          I have reviewed the patient's current medications.     Assessment/Plan     Active Hospital Problems    Diagnosis    • **Alcohol withdrawal (HCC)    • Delirium tremens (HCC)    • Transaminitis      Alcohol abuse- delirium tremens- intubated and sedated in the ICU, continue with Ativan and propofol drip.  Continue with monitoring    Anxiety/depression-continue with monitoring    Hypertension- continue with monitoring blood pressure, hydralazine will be added      Medical Decision Making  Number and Complexity of problems: 1 major complex  Differential Diagnosis: Sepsis    Conditions and Status:        Condition is unchanged.     [facility] Data  External documents reviewed: Previous medical records  My EKG interpretation: None  My plain film interpretation: None  Tests considered but not ordered: None     Decision rules/scores evaluated (example [id number], Wells, etc): None     Discussed with: Psychiatry     Treatment Plan  As above    Care Planning  Shared decision making: Family  Code status and discussions: Full code    Disposition  Social Determinants of Health that impact treatment or disposition: None  I expect the patient to be discharged to home in 4-5 days.      I have utilized all available immediate resources to obtain, update, or review the patient's current medications (including all prescriptions, over-the-counter products, herbals, cannabis/cannabidiol products, and vitamin/mineral/dietary (nutritional) supplements).     I confirmed that the patient's Advance Care Plan is present, code status is documented, or surrogate decision maker is listed in the patient's medical record.     38 minutes critical care time    William Shook MD   03/24/23   13:44  CDT

## 2023-03-25 ENCOUNTER — APPOINTMENT (OUTPATIENT)
Dept: INTERVENTIONAL RADIOLOGY/VASCULAR | Facility: HOSPITAL | Age: 38
DRG: 896 | End: 2023-03-25
Payer: COMMERCIAL

## 2023-03-25 ENCOUNTER — APPOINTMENT (OUTPATIENT)
Dept: ULTRASOUND IMAGING | Facility: HOSPITAL | Age: 38
DRG: 896 | End: 2023-03-25
Payer: COMMERCIAL

## 2023-03-25 PROCEDURE — 36410 VNPNXR 3YR/> PHY/QHP DX/THER: CPT

## 2023-03-25 PROCEDURE — C1751 CATH, INF, PER/CENT/MIDLINE: HCPCS

## 2023-03-25 PROCEDURE — 99233 SBSQ HOSP IP/OBS HIGH 50: CPT | Performed by: PSYCHIATRY & NEUROLOGY

## 2023-03-25 PROCEDURE — 25010000002 THIAMINE PER 100 MG

## 2023-03-25 PROCEDURE — 76937 US GUIDE VASCULAR ACCESS: CPT

## 2023-03-25 PROCEDURE — 25010000002 LORAZEPAM PER 2 MG

## 2023-03-25 PROCEDURE — 25010000002 PROPOFOL 10 MG/ML EMULSION

## 2023-03-25 PROCEDURE — 94003 VENT MGMT INPAT SUBQ DAY: CPT

## 2023-03-25 PROCEDURE — 94799 UNLISTED PULMONARY SVC/PX: CPT

## 2023-03-25 PROCEDURE — 25010000002 HYDRALAZINE PER 20 MG: Performed by: FAMILY MEDICINE

## 2023-03-25 PROCEDURE — 25010000002 LORAZEPAM PER 2 MG: Performed by: PSYCHIATRY & NEUROLOGY

## 2023-03-25 PROCEDURE — 05HB33Z INSERTION OF INFUSION DEVICE INTO RIGHT BASILIC VEIN, PERCUTANEOUS APPROACH: ICD-10-PCS | Performed by: HOSPITALIST

## 2023-03-25 PROCEDURE — 25010000002 ENOXAPARIN PER 10 MG

## 2023-03-25 PROCEDURE — 25010000002 PHENOBARBITAL PER 120 MG: Performed by: PSYCHIATRY & NEUROLOGY

## 2023-03-25 RX ORDER — PHENOBARBITAL SODIUM 130 MG/ML
100 INJECTION INTRAMUSCULAR EVERY 8 HOURS
Status: DISCONTINUED | OUTPATIENT
Start: 2023-03-25 | End: 2023-03-25

## 2023-03-25 RX ORDER — HYDRALAZINE HYDROCHLORIDE 20 MG/ML
10 INJECTION INTRAMUSCULAR; INTRAVENOUS EVERY 6 HOURS PRN
Status: DISCONTINUED | OUTPATIENT
Start: 2023-03-25 | End: 2023-04-05 | Stop reason: HOSPADM

## 2023-03-25 RX ADMIN — PHENOBARBITAL SODIUM 65 MG: 65 INJECTION INTRAMUSCULAR; INTRAVENOUS at 08:58

## 2023-03-25 RX ADMIN — PROPOFOL 50 MCG/KG/MIN: 10 INJECTION, EMULSION INTRAVENOUS at 18:16

## 2023-03-25 RX ADMIN — THIAMINE HYDROCHLORIDE 250 MG: 100 INJECTION, SOLUTION INTRAMUSCULAR; INTRAVENOUS at 15:12

## 2023-03-25 RX ADMIN — Medication 10 ML: at 20:38

## 2023-03-25 RX ADMIN — PHENOBARBITAL SODIUM 100 MG: 130 INJECTION INTRAMUSCULAR at 17:36

## 2023-03-25 RX ADMIN — CHLORHEXIDINE GLUCONATE 0.12% ORAL RINSE 15 ML: 1.2 LIQUID ORAL at 20:38

## 2023-03-25 RX ADMIN — ATENOLOL 100 MG: 50 TABLET ORAL at 09:32

## 2023-03-25 RX ADMIN — PROPOFOL 40 MCG/KG/MIN: 10 INJECTION, EMULSION INTRAVENOUS at 09:34

## 2023-03-25 RX ADMIN — Medication 10 ML: at 09:00

## 2023-03-25 RX ADMIN — LORAZEPAM 4 MG: 2 INJECTION INTRAMUSCULAR; INTRAVENOUS at 05:48

## 2023-03-25 RX ADMIN — PROPOFOL 40 MCG/KG/MIN: 10 INJECTION, EMULSION INTRAVENOUS at 14:32

## 2023-03-25 RX ADMIN — HYDRALAZINE HYDROCHLORIDE 10 MG: 20 INJECTION INTRAMUSCULAR; INTRAVENOUS at 18:12

## 2023-03-25 RX ADMIN — THIAMINE HYDROCHLORIDE 250 MG: 100 INJECTION, SOLUTION INTRAMUSCULAR; INTRAVENOUS at 09:05

## 2023-03-25 RX ADMIN — HYDRALAZINE HYDROCHLORIDE 10 MG: 20 INJECTION INTRAMUSCULAR; INTRAVENOUS at 12:10

## 2023-03-25 RX ADMIN — NICOTINE 1 PATCH: 21 PATCH, EXTENDED RELEASE TRANSDERMAL at 04:20

## 2023-03-25 RX ADMIN — LORAZEPAM 4 MG: 2 INJECTION INTRAMUSCULAR; INTRAVENOUS at 17:36

## 2023-03-25 RX ADMIN — CHLORHEXIDINE GLUCONATE 0.12% ORAL RINSE 15 ML: 1.2 LIQUID ORAL at 09:04

## 2023-03-25 RX ADMIN — SODIUM CHLORIDE, POTASSIUM CHLORIDE, SODIUM LACTATE AND CALCIUM CHLORIDE 150 ML/HR: 600; 310; 30; 20 INJECTION, SOLUTION INTRAVENOUS at 20:48

## 2023-03-25 RX ADMIN — SODIUM CHLORIDE, POTASSIUM CHLORIDE, SODIUM LACTATE AND CALCIUM CHLORIDE 150 ML/HR: 600; 310; 30; 20 INJECTION, SOLUTION INTRAVENOUS at 05:30

## 2023-03-25 RX ADMIN — ACETAMINOPHEN 650 MG: 325 TABLET ORAL at 19:29

## 2023-03-25 RX ADMIN — PROPOFOL 40 MCG/KG/MIN: 10 INJECTION, EMULSION INTRAVENOUS at 04:20

## 2023-03-25 RX ADMIN — THIAMINE HYDROCHLORIDE 250 MG: 100 INJECTION, SOLUTION INTRAMUSCULAR; INTRAVENOUS at 20:38

## 2023-03-25 RX ADMIN — LORAZEPAM 2 MG: 2 INJECTION INTRAMUSCULAR; INTRAVENOUS at 15:39

## 2023-03-25 RX ADMIN — LORAZEPAM 5 MG/HR: 2 INJECTION INTRAMUSCULAR; INTRAVENOUS at 22:15

## 2023-03-25 RX ADMIN — ENOXAPARIN SODIUM 40 MG: 40 INJECTION SUBCUTANEOUS at 09:04

## 2023-03-25 RX ADMIN — PANTOPRAZOLE SODIUM 40 MG: 40 INJECTION, POWDER, FOR SOLUTION INTRAVENOUS at 05:48

## 2023-03-25 RX ADMIN — PROPOFOL 50 MCG/KG/MIN: 10 INJECTION, EMULSION INTRAVENOUS at 22:55

## 2023-03-25 RX ADMIN — SODIUM CHLORIDE, POTASSIUM CHLORIDE, SODIUM LACTATE AND CALCIUM CHLORIDE 150 ML/HR: 600; 310; 30; 20 INJECTION, SOLUTION INTRAVENOUS at 12:26

## 2023-03-25 NOTE — PROGRESS NOTES
Psychiatry Progress Note    3/25/2023    Chief Complaint: delirium tremens    Subjective:  Patient is a 37 y.o. male who was hospitalized for delirium tremens.    Patient is intubated and unable to interact.  His wife is at bedside and discussed case and answered questions.    Patient's blood pressures have been elevated since yesterday morning.  Patient's BP was normal on 3/23/23 and he only received atenolol 100mg that day.  This indicates that elevation is likely to be from his alcohol withdrawal.    Patient is currently at 4mg/hr of ativan and phenobarb at 65mg bid.    Objective     Vital Signs    Vitals:    03/25/23 1033 03/25/23 1100 03/25/23 1200 03/25/23 1210   BP:  172/79 174/74 174/74   BP Location:   Right leg    Patient Position:   Lying    Pulse: 69 71 69    Resp:       Temp:   98.5 °F (36.9 °C)    TempSrc:   Temporal    SpO2: 99% 100% 99%    Weight:       Height:           Physical Exam: as of today, 03/25/23   General Appearance: intubated and sedated,  Hygiene:   fair  Gait & Station: in bed intubated and sedated  Musculoskeletal: No tremors or abnormal involuntary movements    Mental Status Exam: as of today, 03/25/23   Cooperation:  intubated and sedated  Eye Contact:  Closed  Psychomotor Behavior:  intubated and sedated  Mood: intubated and sedated and unable to answer  Affect:  asleep  Speech:  intubated and nonverbal  Thought Process:  intubated and nonverbal  Associations: intubated and nonverbal  Thought Content:     intubated and nonverbal   Suicidal:  None evidenced   Homicidal:  None evidenced   Hallucinations:  Not demonstrated today due to being intubated   Delusion:  Unable to demonstrate  Cognitive Functioning:   Consciousness: intubated and sedated  Reliability:  unable to assess due to being intubated  Insight:  unable to assess due to being intubated  Judgement:  unable to assess due to being intubated  Impulse Control:  unable to assess due to being intubated    Lab Results: Results  source: EMR   Lab Results (last 24 hours)     Procedure Component Value Units Date/Time    Potassium [[id number]]  (Normal) Collected: 03/24/23 2221    Specimen: Blood Updated: 03/24/23 2247     Potassium 4.8 mmol/L      Comment: Slight hemolysis detected by analyzer. Results may be affected.             Radiology Results:  Imaging Results (Last 24 Hours)     Procedure Component Value Units Date/Time    IR Insert Midline Without Port Pump 5 Plus [[id number]] Resulted: 03/25/23 1141     Updated: 03/25/23 1141    Narrative:      This procedure was auto-finalized with no dictation required.    US Guided Vascular Access [[id number]] Collected: 03/25/23 1050     Updated: 03/25/23 1130    Narrative:      PROCEDURE: Ultrasound Guidance Vascular Access      Ordering physician(s): ADA SEXTON    Clinical Indication: Vascular access      Findings:    The right basilic vein was sonographically evaluated and  determined to be patent. Concurrent realtime ultrasound was used  to visualize needle entry into the right basilic vein  for  midline catheter placement and 2 permanent images acquired for  permanent recording and reporting.         Impression:      Impression:   As above.    Electronically signed by:  Eric Smalls MD  3/25/2023 11:28 AM CDT  Workstation: [id number]          Medicine:   Current Facility-Administered Medications:   •  acetaminophen (TYLENOL) tablet 650 mg, 650 mg, Oral, Q4H PRN **OR** [DISCONTINUED] acetaminophen (TYLENOL) 160 MG/5ML solution 650 mg, 650 mg, Oral, Q4H PRN **OR** acetaminophen (TYLENOL) suppository 650 mg, 650 mg, Rectal, Q4H PRN, Harshal Jackson MD  •  atenolol (TENORMIN) tablet 100 mg, 100 mg, Oral, Daily, Harshal Jackson MD, 100 mg at 03/25/23 0932  •  chlorhexidine (PERIDEX) 0.12 % solution 15 mL, 15 mL, Mouth/Throat, Q12H, Bertrand Conteh MD, 15 mL at 03/25/23 0904  •  Enoxaparin Sodium (LOVENOX) syringe 40 mg, 40 mg, Subcutaneous, Daily, Harshal Jackson MD, 40 mg  at 03/25/23 0904  •  hydrALAZINE (APRESOLINE) injection 10 mg, 10 mg, Intravenous, Q6H PRN, William Shook MD, 10 mg at 03/25/23 1210  •  lactated ringers infusion, 150 mL/hr, Intravenous, Continuous, Harshal Jackson MD, Last Rate: 150 mL/hr at 03/25/23 1226, 150 mL/hr at 03/25/23 1226  •  LORazepam (ATIVAN) 100 mg in sodium chloride 0.9 % 100 mL infusion, 0.5-10 mg/hr, Intravenous, Titrated, Harshal Jackson MD, Last Rate: 4 mL/hr at 03/24/23 2020, 4 mg/hr at 03/24/23 2020  •  LORazepam (ATIVAN) tablet 1 mg, 1 mg, Oral, Q2H PRN **OR** LORazepam (ATIVAN) injection 1 mg, 1 mg, Intravenous, Q2H PRN, 1 mg at 03/23/23 1921 **OR** LORazepam (ATIVAN) tablet 2 mg, 2 mg, Oral, Q1H PRN **OR** LORazepam (ATIVAN) injection 2 mg, 2 mg, Intravenous, Q1H PRN, 2 mg at 03/23/23 2115 **OR** LORazepam (ATIVAN) injection 2 mg, 2 mg, Intravenous, Q15 Min PRN, 2 mg at 03/23/23 2321 **OR** LORazepam (ATIVAN) injection 2 mg, 2 mg, Intramuscular, Q15 Min PRN, Harshal Jackson MD  •  LORazepam (ATIVAN) injection 4 mg, 4 mg, Intravenous, Q1H PRN, Tanya Perez MD, 4 mg at 03/25/23 0548  •  Magnesium Sulfate - Total Dose 10 grams - Magnesium 1 or Less, 2 g, Intravenous, PRN **OR** Magnesium Sulfate - Total Dose 6 grams - Magnesium 1.1 - 1.5, 2 g, Intravenous, PRN **OR** Magnesium Sulfate - Total Dose 4 grams - Magnesium 1.6 - 1.9, 4 g, Intravenous, PRN, Rhona Hinds APRN, Last Rate: 25 mL/hr at 03/23/23 1226, 4 g at 03/23/23 1226  •  morphine injection 2 mg, 2 mg, Intravenous, Q4H PRN **AND** naloxone (NARCAN) injection 0.4 mg, 0.4 mg, Intravenous, Q5 Min PRN, Harshal Jackson MD  •  nicotine (NICODERM CQ) 21 MG/24HR patch 1 patch, 1 patch, Transdermal, Q24H, Harshal Jackson MD, 1 patch at 03/25/23 0420  •  ondansetron (ZOFRAN) tablet 4 mg, 4 mg, Oral, Q6H PRN **OR** ondansetron (ZOFRAN) injection 4 mg, 4 mg, Intravenous, Q6H PRN, Harshal Jackson MD  •  pantoprazole (PROTONIX) injection  40 mg, 40 mg, Intravenous, Q AM, Bertrand Conteh MD, 40 mg at 03/25/23 0548  •  [COMPLETED] PHENobarbital 65 mg in sodium chloride 0.9 % 100 mL IVPB, 65 mg, Intravenous, Once, 65 mg at 03/25/23 0858 **FOLLOWED BY** PHENobarbital 65 mg in sodium chloride 0.9 % 100 mL IVPB, 65 mg, Intravenous, Once **FOLLOWED BY** [START ON 3/26/2023] PHENobarbital (LUMINAL) tablet 32.4 mg, 32.4 mg, Oral, Once **FOLLOWED BY** [START ON 3/26/2023] PHENobarbital (LUMINAL) tablet 32.4 mg, 32.4 mg, Oral, Once **FOLLOWED BY** [START ON 3/27/2023] PHENobarbital (LUMINAL) tablet 32.4 mg, 32.4 mg, Oral, Once, Tanya Perez MD  •  potassium chloride (MICRO-K) CR capsule 40 mEq, 40 mEq, Oral, PRN, 40 mEq at 03/23/23 1859 **OR** potassium chloride (KLOR-CON) packet 40 mEq, 40 mEq, Oral, PRN, 40 mEq at 03/24/23 1628 **OR** potassium chloride 10 mEq in 100 mL IVPB, 10 mEq, Intravenous, Q1H PRN, Rhona Hnids, APRN  •  prochlorperazine (COMPAZINE) injection 2.5 mg, 2.5 mg, Intravenous, Q6H PRN, Harshal Jackson MD, 2.5 mg at 03/22/23 0317  •  propofol (DIPRIVAN) infusion 10 mg/mL 100 mL, 5-50 mcg/kg/min, Intravenous, Titrated, Harshal Jackson MD, Last Rate: 20.5 mL/hr at 03/25/23 0934, 40 mcg/kg/min at 03/25/23 0934  •  sodium chloride 0.9 % flush 10 mL, 10 mL, Intravenous, Q12H, Harshal Jackson MD, 10 mL at 03/25/23 0900  •  sodium chloride 0.9 % flush 10 mL, 10 mL, Intravenous, PRN, Harshal Jackson MD  •  sodium chloride 0.9 % infusion 40 mL, 40 mL, Intravenous, PRN, Harshal Jackson MD  •  thiamine (B-1) 250 mg in sodium chloride 0.9 % 100 mL IVPB, 250 mg, Intravenous, TID, Harshal Jackson MD, Last Rate: 200 mL/hr at 03/25/23 0905, 250 mg at 03/25/23 0905    Diagnoses/Assessment:     Alcohol withdrawal (HCC)    Transaminitis    Delirium tremens (HCC)    Anxiety disorder    Affective disorder (HCC)      Treatment Plan:    Alcohol Withdrawal/Delirium Tremens:              --Increase Phenobart back  to 100mg q8hrs and consider change to taper tomorrow.              --Cont CIIWA and PRN ativan              --Increase Ativan drip back to 5mg/hr.  Hold at this dose until seen by psych tomorrow.              --Cont ativan 4mg q1hrs PRN for agitation and autonomic instability.      Anxiety & Affective Disorder:              --Will consider psychotherapeutic agents after extubation and management of the detox.    HTN:    --Continued atenolol 100mg daily.   --Treat BP elevation with PRN ativan for withdrawal given reasoning above.    Tanya Perez MD  03/25/23 at 13:24 CDT

## 2023-03-25 NOTE — PLAN OF CARE
Goal Outcome Evaluation:  Plan of Care Reviewed With: other (see comments)        Progress: no change  Outcome Evaluation: VSS. last CIWA 5, PRN ativan given for SBP over 160, Afebri;le. Resting well at this time. All needs met at this time.

## 2023-03-25 NOTE — PROGRESS NOTES
[facility] Medicine Services  INPATIENT PROGRESS NOTE    Length of Stay: 3  Date of Admission: 3/22/2023  Primary Care Physician: Brandon Darling MD    Subjective   Chief Complaint: Alcohol withdrawal  HPI:    Patient is in the ICU intubated and sedated continue with propofol and Ativan drip.    Review of Systems   Unable to perform ROS: Intubated          All pertinent negatives and positives are as above. All other systems have been reviewed and are negative unless otherwise stated.     Objective    Temp:  [98.3 °F (36.8 °C)-99.6 °F (37.6 °C)] 98.9 °F (37.2 °C)  Heart Rate:  [61-86] 70  Resp:  [16-24] 16  BP: (133-197)/(63-88) 168/69  FiO2 (%):  [30 %] 30 %  Physical Exam  Vitals and nursing note reviewed.   Constitutional:       General: He is not in acute distress.     Appearance: He is well-developed. He is not diaphoretic.      Interventions: He is intubated.   HENT:      Head: Normocephalic and atraumatic.   Cardiovascular:      Rate and Rhythm: Normal rate.   Pulmonary:      Effort: Pulmonary effort is normal. No respiratory distress. He is intubated.      Breath sounds: No wheezing.   Abdominal:      General: There is no distension.      Palpations: Abdomen is soft.   Musculoskeletal:         General: Normal range of motion.   Skin:     General: Skin is warm and dry.   Neurological:      Cranial Nerves: No cranial nerve deficit.             Results Review:  I have reviewed the labs, radiology results, and diagnostic studies.    Laboratory Data:   Lab Results (last 24 hours)     Procedure Component Value Units Date/Time    Potassium [[id number]]  (Normal) Collected: 03/24/23 2221    Specimen: Blood Updated: 03/24/23 2247     Potassium 4.8 mmol/L      Comment: Slight hemolysis detected by analyzer. Results may be affected.       Hepatitis B Surface Antibody [[id number]]  (Normal) Collected: 03/23/23 2345    Specimen: Blood Updated: 03/24/23 1245     Hep B S Ab  Non-Reactive    Narrative:      Non-Reactive - Individual is considered to be not immune to infection with HBV.    Equivocal - Unable to determine if anti-HBs is present at levels consistent with immunity. The individual should be further assessed by associated risk factors and the use of additional diagnositc information, or another sample may be collected and tested.    Reactive - Anti-HBs concentration detected at >10 mIU/mL. Individual is considered to be immune to infection with HBV.      Results may be falsely decreased if patient taking Biotin.      Comprehensive Metabolic Panel [[id number]]  (Abnormal) Collected: 03/24/23 1006    Specimen: Blood Updated: 03/24/23 1028     Glucose 85 mg/dL      BUN 2 mg/dL      Creatinine 0.63 mg/dL      Sodium 139 mmol/L      Potassium 3.5 mmol/L      Chloride 106 mmol/L      CO2 23.0 mmol/L      Calcium 8.5 mg/dL      Total Protein 5.9 g/dL      Albumin 3.6 g/dL      ALT (SGPT) 55 U/L      AST (SGOT) 75 U/L      Alkaline Phosphatase 97 U/L      Total Bilirubin 0.7 mg/dL      Globulin 2.3 gm/dL      A/G Ratio 1.6 g/dL      BUN/Creatinine Ratio 3.2     Anion Gap 10.0 mmol/L      eGFR 126.4 mL/min/1.73     Narrative:      GFR Normal >60  Chronic Kidney Disease <60  Kidney Failure <15            Culture Data:   No results found for: BLOODCX  No results found for: URINECX  No results found for: RESPCX  No results found for: WOUNDCX  No results found for: STOOLCX  No components found for: BODYFLD    Radiology Data:   Imaging Results (Last 24 Hours)     Procedure Component Value Units Date/Time    XR Abdomen KUB [[id number]] Collected: 03/24/23 1046     Updated: 03/24/23 1145    Narrative:      Procedure: Supine abdomen    Reason for exam: Cortrak placement    FINDINGS: Limited supine abdomen view submitted to evaluate  feeding tube positioning. The feeding tube is seen with tip  within the region of the gastric antrum.      Impression:      The feeding tube is seen with tip within  the region of  the gastric antrum.    Electronically signed by:  Willem Harris MD  3/24/2023 11:42 AM CDT  Workstation: [id number]          I have reviewed the patient's current medications.     Assessment/Plan     Active Hospital Problems    Diagnosis    • **Alcohol withdrawal (HCC)    • Delirium tremens (HCC)    • Anxiety disorder    • Affective disorder (HCC)    • Transaminitis         Alcohol abuse- delirium tremens- intubated and sedated in the ICU, continue with Ativan and propofol drip.  Continue with monitoring     Anxiety/depression-continue with monitoring     Hypertension- continue with monitoring blood pressure, hydralazine will be added  Atenolol will be restarted today        Medical Decision Making  Number and Complexity of problems: 1 major complex  Differential Diagnosis: Sepsis     Conditions and Status:        Condition is unchanged.     [facility] Data  External documents reviewed: Previous medical records  My EKG interpretation: None  My plain film interpretation: None  Tests considered but not ordered: None     Decision rules/scores evaluated (example [id number], Wells, etc): None     Discussed with: Psychiatry     Treatment Plan  As above     Care Planning  Shared decision making: Family  Code status and discussions: Full code     Disposition  Social Determinants of Health that impact treatment or disposition: None  I expect the patient to be discharged to home in 4-5 days.       I have utilized all available immediate resources to obtain, update, or review the patient's current medications (including all prescriptions, over-the-counter products, herbals, cannabis/cannabidiol products, and vitamin/mineral/dietary (nutritional) supplements).      I confirmed that the patient's Advance Care Plan is present, code status is documented, or surrogate decision maker is listed in the patient's medical record.      40 minutes critical care time    William Shook MD   03/25/23   10:24 CDT

## 2023-03-25 NOTE — PLAN OF CARE
Problem: Device-Related Complication Risk (Mechanical Ventilation, Invasive)  Goal: Optimal Device Function  Intervention: Optimize Device Care and Function  Recent Flowsheet Documentation  Taken 3/24/2023 1902 by Amalia Mendoza, RRT  Airway Safety Measures:   manual resuscitator/mask at bedside   oxygen flowmeter at bedside   suction at bedside     Problem: Inability to Wean (Mechanical Ventilation, Invasive)  Goal: Mechanical Ventilation Liberation  Outcome: Ongoing, Progressing   Goal Outcome Evaluation:               Pt tolerating current vent setting, no signs of distress noted at this time. Pt does not meet criteria for SBT due to alcohol withdraw, pt remains on CIWA protocol. Will continue to monitor.

## 2023-03-25 NOTE — PROGRESS NOTES
TWO PATIENT IDENTIFIERS WERE USED. THE PATIENT WAS DRAPED WITH A FULL BODY DRAPE AND THE PATIENT'S RIGHT ARM WAS PREPPED WITH CHLORA PREP. ULTRASOUND WAS USED TO LOCALIZE THERIGHT BASILIC VEIN. SUBCUTANEOUS TISSUE AT THE CATHETER SITE WAS INFILTRATED WITH 2% LIDOCAINE. UNDER ULTRASOUND GUIDANCE, THE VEIN WAS ACCESSED WITH A 21 GAUGE  NEEDLE. AN 0.018 WIRE WAS THEN THREADED THROUGH THE NEEDLE. THE 21 GAUGE NEEDLE WAS REMOVED AND A 4 Tamazight SHEATH WAS PLACED OVER THE WIRE INTO THE VEIN.THE MIDLINE CATHETER WAS TRIMMED TO 17 CM. THE MIDLINE CATHETER WAS THEN PLACED OVER THE WIRE INTO THE VEIN, THE SHEATH WAS PEELED AWAY, WIRE WAS REMOVED. CATHETER WAS FLUSHED WITH NORMAL SALINE AND CATHETER TIP APPLIED. BIOPATCH PLACED. CATHETER SECURED WITH STAT LOCK AND TEGADERM. PATIENT TOLERATED PROCEDURE WELL. THIS WAS DONE AT BEDSIDE      IMPRESSION:SUCCESSFUL PLACEMENT OF DUAL LUMEN MIDLINE.           Corinne Posadas  3/25/2023  11:41 CDT

## 2023-03-26 LAB
ALBUMIN SERPL-MCNC: 3.1 G/DL (ref 3.5–5.2)
ALBUMIN/GLOB SERPL: 1.3 G/DL
ALP SERPL-CCNC: 106 U/L (ref 39–117)
ALT SERPL W P-5'-P-CCNC: 44 U/L (ref 1–41)
ANION GAP SERPL CALCULATED.3IONS-SCNC: 9 MMOL/L (ref 5–15)
AST SERPL-CCNC: 44 U/L (ref 1–40)
BASOPHILS # BLD AUTO: 0.06 10*3/MM3 (ref 0–0.2)
BASOPHILS NFR BLD AUTO: 0.5 % (ref 0–1.5)
BILIRUB SERPL-MCNC: 0.3 MG/DL (ref 0–1.2)
BUN SERPL-MCNC: 5 MG/DL (ref 6–20)
BUN/CREAT SERPL: 5.9 (ref 7–25)
CALCIUM SPEC-SCNC: 8.5 MG/DL (ref 8.6–10.5)
CHLORIDE SERPL-SCNC: 105 MMOL/L (ref 98–107)
CO2 SERPL-SCNC: 24 MMOL/L (ref 22–29)
CREAT SERPL-MCNC: 0.85 MG/DL (ref 0.76–1.27)
DEPRECATED RDW RBC AUTO: 53.5 FL (ref 37–54)
EGFRCR SERPLBLD CKD-EPI 2021: 115.5 ML/MIN/1.73
EOSINOPHIL # BLD AUTO: 0.03 10*3/MM3 (ref 0–0.4)
EOSINOPHIL NFR BLD AUTO: 0.3 % (ref 0.3–6.2)
ERYTHROCYTE [DISTWIDTH] IN BLOOD BY AUTOMATED COUNT: 14.2 % (ref 12.3–15.4)
GLOBULIN UR ELPH-MCNC: 2.4 GM/DL
GLUCOSE SERPL-MCNC: 105 MG/DL (ref 65–99)
HCT VFR BLD AUTO: 36.2 % (ref 37.5–51)
HGB BLD-MCNC: 12 G/DL (ref 13–17.7)
IMM GRANULOCYTES # BLD AUTO: 0.09 10*3/MM3 (ref 0–0.05)
IMM GRANULOCYTES NFR BLD AUTO: 0.8 % (ref 0–0.5)
LYMPHOCYTES # BLD AUTO: 1.22 10*3/MM3 (ref 0.7–3.1)
LYMPHOCYTES NFR BLD AUTO: 10.5 % (ref 19.6–45.3)
MCH RBC QN AUTO: 33.8 PG (ref 26.6–33)
MCHC RBC AUTO-ENTMCNC: 33.1 G/DL (ref 31.5–35.7)
MCV RBC AUTO: 102 FL (ref 79–97)
MONOCYTES # BLD AUTO: 1.23 10*3/MM3 (ref 0.1–0.9)
MONOCYTES NFR BLD AUTO: 10.6 % (ref 5–12)
NEUTROPHILS NFR BLD AUTO: 77.3 % (ref 42.7–76)
NEUTROPHILS NFR BLD AUTO: 8.99 10*3/MM3 (ref 1.7–7)
NRBC BLD AUTO-RTO: 0 /100 WBC (ref 0–0.2)
PLATELET # BLD AUTO: 189 10*3/MM3 (ref 140–450)
PMV BLD AUTO: 10.6 FL (ref 6–12)
POTASSIUM SERPL-SCNC: 3.9 MMOL/L (ref 3.5–5.2)
PROT SERPL-MCNC: 5.5 G/DL (ref 6–8.5)
RBC # BLD AUTO: 3.55 10*6/MM3 (ref 4.14–5.8)
SODIUM SERPL-SCNC: 138 MMOL/L (ref 136–145)
WBC NRBC COR # BLD: 11.62 10*3/MM3 (ref 3.4–10.8)

## 2023-03-26 PROCEDURE — 25010000002 PHENOBARBITAL PER 120 MG: Performed by: PSYCHIATRY & NEUROLOGY

## 2023-03-26 PROCEDURE — 85025 COMPLETE CBC W/AUTO DIFF WBC: CPT | Performed by: FAMILY MEDICINE

## 2023-03-26 PROCEDURE — 25010000002 HYDRALAZINE PER 20 MG: Performed by: FAMILY MEDICINE

## 2023-03-26 PROCEDURE — 25010000002 PROPOFOL 10 MG/ML EMULSION

## 2023-03-26 PROCEDURE — 94003 VENT MGMT INPAT SUBQ DAY: CPT

## 2023-03-26 PROCEDURE — 94799 UNLISTED PULMONARY SVC/PX: CPT

## 2023-03-26 PROCEDURE — 25010000002 LORAZEPAM PER 2 MG: Performed by: PSYCHIATRY & NEUROLOGY

## 2023-03-26 PROCEDURE — 25010000002 THIAMINE PER 100 MG: Performed by: PSYCHIATRY & NEUROLOGY

## 2023-03-26 PROCEDURE — 99233 SBSQ HOSP IP/OBS HIGH 50: CPT | Performed by: PSYCHIATRY & NEUROLOGY

## 2023-03-26 PROCEDURE — 25010000002 THIAMINE PER 100 MG

## 2023-03-26 PROCEDURE — 25010000002 ENOXAPARIN PER 10 MG

## 2023-03-26 PROCEDURE — 25010000002 LORAZEPAM PER 2 MG

## 2023-03-26 PROCEDURE — 80053 COMPREHEN METABOLIC PANEL: CPT | Performed by: FAMILY MEDICINE

## 2023-03-26 RX ADMIN — ATENOLOL 100 MG: 50 TABLET ORAL at 08:25

## 2023-03-26 RX ADMIN — LORAZEPAM 5 MG/HR: 2 INJECTION INTRAMUSCULAR; INTRAVENOUS at 21:30

## 2023-03-26 RX ADMIN — SODIUM CHLORIDE, POTASSIUM CHLORIDE, SODIUM LACTATE AND CALCIUM CHLORIDE 150 ML/HR: 600; 310; 30; 20 INJECTION, SOLUTION INTRAVENOUS at 12:17

## 2023-03-26 RX ADMIN — CHLORHEXIDINE GLUCONATE 0.12% ORAL RINSE 15 ML: 1.2 LIQUID ORAL at 20:12

## 2023-03-26 RX ADMIN — THIAMINE HYDROCHLORIDE 250 MG: 100 INJECTION, SOLUTION INTRAMUSCULAR; INTRAVENOUS at 15:27

## 2023-03-26 RX ADMIN — PROPOFOL 50 MCG/KG/MIN: 10 INJECTION, EMULSION INTRAVENOUS at 05:06

## 2023-03-26 RX ADMIN — SODIUM CHLORIDE, POTASSIUM CHLORIDE, SODIUM LACTATE AND CALCIUM CHLORIDE 150 ML/HR: 600; 310; 30; 20 INJECTION, SOLUTION INTRAVENOUS at 19:58

## 2023-03-26 RX ADMIN — ACETAMINOPHEN 650 MG: 325 TABLET ORAL at 23:40

## 2023-03-26 RX ADMIN — PROPOFOL 50 MCG/KG/MIN: 10 INJECTION, EMULSION INTRAVENOUS at 13:30

## 2023-03-26 RX ADMIN — ENOXAPARIN SODIUM 40 MG: 40 INJECTION SUBCUTANEOUS at 08:24

## 2023-03-26 RX ADMIN — PHENOBARBITAL SODIUM 100 MG: 130 INJECTION INTRAMUSCULAR at 17:48

## 2023-03-26 RX ADMIN — PANTOPRAZOLE SODIUM 40 MG: 40 INJECTION, POWDER, FOR SOLUTION INTRAVENOUS at 05:06

## 2023-03-26 RX ADMIN — PROPOFOL 50 MCG/KG/MIN: 10 INJECTION, EMULSION INTRAVENOUS at 17:31

## 2023-03-26 RX ADMIN — NICOTINE 1 PATCH: 21 PATCH, EXTENDED RELEASE TRANSDERMAL at 05:05

## 2023-03-26 RX ADMIN — SODIUM CHLORIDE, POTASSIUM CHLORIDE, SODIUM LACTATE AND CALCIUM CHLORIDE 150 ML/HR: 600; 310; 30; 20 INJECTION, SOLUTION INTRAVENOUS at 04:22

## 2023-03-26 RX ADMIN — PROPOFOL 50 MCG/KG/MIN: 10 INJECTION, EMULSION INTRAVENOUS at 23:58

## 2023-03-26 RX ADMIN — Medication 10 ML: at 08:25

## 2023-03-26 RX ADMIN — PROPOFOL 50 MCG/KG/MIN: 10 INJECTION, EMULSION INTRAVENOUS at 09:24

## 2023-03-26 RX ADMIN — THIAMINE HYDROCHLORIDE 500 MG: 100 INJECTION, SOLUTION INTRAMUSCULAR; INTRAVENOUS at 20:12

## 2023-03-26 RX ADMIN — PROPOFOL 50 MCG/KG/MIN: 10 INJECTION, EMULSION INTRAVENOUS at 02:08

## 2023-03-26 RX ADMIN — PHENOBARBITAL SODIUM 100 MG: 130 INJECTION INTRAMUSCULAR at 09:22

## 2023-03-26 RX ADMIN — HYDRALAZINE HYDROCHLORIDE 10 MG: 20 INJECTION INTRAMUSCULAR; INTRAVENOUS at 16:47

## 2023-03-26 RX ADMIN — PHENOBARBITAL SODIUM 100 MG: 130 INJECTION INTRAMUSCULAR at 00:55

## 2023-03-26 RX ADMIN — PROPOFOL 50 MCG/KG/MIN: 10 INJECTION, EMULSION INTRAVENOUS at 21:41

## 2023-03-26 RX ADMIN — CHLORHEXIDINE GLUCONATE 0.12% ORAL RINSE 15 ML: 1.2 LIQUID ORAL at 08:25

## 2023-03-26 RX ADMIN — THIAMINE HYDROCHLORIDE 250 MG: 100 INJECTION, SOLUTION INTRAMUSCULAR; INTRAVENOUS at 09:24

## 2023-03-26 RX ADMIN — Medication 10 ML: at 20:12

## 2023-03-26 RX ADMIN — LORAZEPAM 4 MG: 2 INJECTION INTRAMUSCULAR; INTRAVENOUS at 18:12

## 2023-03-26 NOTE — PLAN OF CARE
Problem: Inability to Wean (Mechanical Ventilation, Invasive)  Goal: Mechanical Ventilation Liberation  Outcome: Ongoing, Not Progressing   Goal Outcome Evaluation:            Pt tolerate well with current vent settings and continue monitor this time.

## 2023-03-26 NOTE — PROGRESS NOTES
Psychiatry Progress Note    3/26/2023    Chief Complaint: delirium tremens    Subjective:  Patient is a 37 y.o. male who was hospitalized for delirium tremens.    Remains intubated.  Does not meaningfully interact.     No family at bedside.     BP has improved.  No hypertonia.     Improved overall per discussion w/ staff.       Objective     Vital Signs    Vitals:    03/26/23 1300 03/26/23 1350 03/26/23 1400 03/26/23 1500   BP: 144/66  159/70 127/58   BP Location:       Patient Position:       Pulse: 68 71 74 69   Resp:       Temp:       TempSrc:       SpO2: 97% 97% 97% 97%   Weight:       Height:           Physical Exam: as of today, 03/26/23   General Appearance: intubated and sedated  Hygiene:   fair  Gait & Station: in bed intubated and sedated  Musculoskeletal: No tremors or abnormal involuntary movements    Mental Status Exam: as of today, 03/26/23   Cooperation:  intubated and sedated  Eye Contact:  Closed  Psychomotor Behavior:  intubated and sedated  Mood: intubated and sedated and unable to answer  Affect:  Unable to obtain given sedation  Speech:  intubated and nonverbal  Thought Process:  intubated and nonverbal  Associations: intubated and nonverbal  Thought Content:     intubated and nonverbal   Suicidal:  None evidenced   Homicidal:  None evidenced   Hallucinations:  Not demonstrated today due to being intubated   Delusion:  Unable to demonstrate  Cognitive Functioning:   Consciousness: intubated and sedated  Reliability:  unable to assess due to being intubated  Insight:  unable to assess due to being intubated  Judgement:  unable to assess due to being intubated  Impulse Control:  unable to assess due to being intubated    Lab Results: Results source: EMR   Lab Results (last 24 hours)     Procedure Component Value Units Date/Time    Comprehensive Metabolic Panel [[id number]]  (Abnormal) Collected: 03/26/23 0440    Specimen: Blood Updated: 03/26/23 0517     Glucose 105 mg/dL      BUN 5 mg/dL       Creatinine 0.85 mg/dL      Sodium 138 mmol/L      Potassium 3.9 mmol/L      Chloride 105 mmol/L      CO2 24.0 mmol/L      Calcium 8.5 mg/dL      Total Protein 5.5 g/dL      Albumin 3.1 g/dL      ALT (SGPT) 44 U/L      AST (SGOT) 44 U/L      Alkaline Phosphatase 106 U/L      Total Bilirubin 0.3 mg/dL      Globulin 2.4 gm/dL      A/G Ratio 1.3 g/dL      BUN/Creatinine Ratio 5.9     Anion Gap 9.0 mmol/L      eGFR 115.5 mL/min/1.73     Narrative:      GFR Normal >60  Chronic Kidney Disease <60  Kidney Failure <15      CBC & Differential [[id number]]  (Abnormal) Collected: 03/26/23 0440    Specimen: Blood Updated: 03/26/23 0501    Narrative:      The following orders were created for panel order CBC & Differential.  Procedure                               Abnormality         Status                     ---------                               -----------         ------                     CBC Auto Differential[[id number]]        Abnormal            Final result                 Please view results for these tests on the individual orders.    CBC Auto Differential [[id number]]  (Abnormal) Collected: 03/26/23 0440    Specimen: Blood Updated: 03/26/23 0501     WBC 11.62 10*3/mm3      RBC 3.55 10*6/mm3      Hemoglobin 12.0 g/dL      Hematocrit 36.2 %      .0 fL      MCH 33.8 pg      MCHC 33.1 g/dL      RDW 14.2 %      RDW-SD 53.5 fl      MPV 10.6 fL      Platelets 189 10*3/mm3      Neutrophil % 77.3 %      Lymphocyte % 10.5 %      Monocyte % 10.6 %      Eosinophil % 0.3 %      Basophil % 0.5 %      Immature Grans % 0.8 %      Neutrophils, Absolute 8.99 10*3/mm3      Lymphocytes, Absolute 1.22 10*3/mm3      Monocytes, Absolute 1.23 10*3/mm3      Eosinophils, Absolute 0.03 10*3/mm3      Basophils, Absolute 0.06 10*3/mm3      Immature Grans, Absolute 0.09 10*3/mm3      nRBC 0.0 /100 WBC           Radiology Results:  Imaging Results (Last 24 Hours)     ** No results found for the last 24 hours. **          Medicine:   Current  Facility-Administered Medications:   •  acetaminophen (TYLENOL) tablet 650 mg, 650 mg, Oral, Q4H PRN, 650 mg at 03/25/23 1929 **OR** [DISCONTINUED] acetaminophen (TYLENOL) 160 MG/5ML solution 650 mg, 650 mg, Oral, Q4H PRN **OR** acetaminophen (TYLENOL) suppository 650 mg, 650 mg, Rectal, Q4H PRN, Harshal Jackson MD  •  atenolol (TENORMIN) tablet 100 mg, 100 mg, Oral, Daily, Harshal Jackson MD, 100 mg at 03/26/23 0825  •  chlorhexidine (PERIDEX) 0.12 % solution 15 mL, 15 mL, Mouth/Throat, Q12H, Bertrand Conteh MD, 15 mL at 03/26/23 0825  •  Enoxaparin Sodium (LOVENOX) syringe 40 mg, 40 mg, Subcutaneous, Daily, Harshal Jackson MD, 40 mg at 03/26/23 0824  •  hydrALAZINE (APRESOLINE) injection 10 mg, 10 mg, Intravenous, Q6H PRN, ShookWilliam al MD, 10 mg at 03/25/23 1812  •  lactated ringers infusion, 150 mL/hr, Intravenous, Continuous, Harshal Jackson MD, Last Rate: 150 mL/hr at 03/26/23 1217, 150 mL/hr at 03/26/23 1217  •  LORazepam (ATIVAN) 100 mg in sodium chloride 0.9 % 100 mL infusion, 0.5-10 mg/hr, Intravenous, Titrated, Harshal Jackson MD, Last Rate: 5 mL/hr at 03/25/23 2215, 5 mg/hr at 03/25/23 2215  •  LORazepam (ATIVAN) tablet 1 mg, 1 mg, Oral, Q2H PRN **OR** LORazepam (ATIVAN) injection 1 mg, 1 mg, Intravenous, Q2H PRN, 1 mg at 03/23/23 1921 **OR** LORazepam (ATIVAN) tablet 2 mg, 2 mg, Oral, Q1H PRN **OR** LORazepam (ATIVAN) injection 2 mg, 2 mg, Intravenous, Q1H PRN, 2 mg at 03/25/23 1539 **OR** LORazepam (ATIVAN) injection 2 mg, 2 mg, Intravenous, Q15 Min PRN, 2 mg at 03/23/23 2321 **OR** LORazepam (ATIVAN) injection 2 mg, 2 mg, Intramuscular, Q15 Min PRN, Harshal Jackson MD  •  LORazepam (ATIVAN) injection 4 mg, 4 mg, Intravenous, Q1H PRN, Tanya Perez MD, 4 mg at 03/25/23 1736  •  Magnesium Sulfate - Total Dose 10 grams - Magnesium 1 or Less, 2 g, Intravenous, PRN **OR** Magnesium Sulfate - Total Dose 6 grams - Magnesium 1.1 - 1.5, 2 g, Intravenous,  PRN **OR** Magnesium Sulfate - Total Dose 4 grams - Magnesium 1.6 - 1.9, 4 g, Intravenous, PRN, Rhona Hinds S, APRN, Last Rate: 25 mL/hr at 03/23/23 1226, 4 g at 03/23/23 1226  •  morphine injection 2 mg, 2 mg, Intravenous, Q4H PRN **AND** naloxone (NARCAN) injection 0.4 mg, 0.4 mg, Intravenous, Q5 Min PRN, Harshal Jackson MD  •  nicotine (NICODERM CQ) 21 MG/24HR patch 1 patch, 1 patch, Transdermal, Q24H, Harshal Jackson MD, 1 patch at 03/26/23 0505  •  ondansetron (ZOFRAN) tablet 4 mg, 4 mg, Oral, Q6H PRN **OR** ondansetron (ZOFRAN) injection 4 mg, 4 mg, Intravenous, Q6H PRN, Harshal Jackson MD  •  pantoprazole (PROTONIX) injection 40 mg, 40 mg, Intravenous, Q AM, Bertrand Conteh MD, 40 mg at 03/26/23 0506  •  PHENobarbital 100 mg in sodium chloride 0.9 % 100 mL IVPB, 100 mg, Intravenous, Q8H, Tanya Perez MD, 100 mg at 03/26/23 0922  •  potassium chloride (MICRO-K) CR capsule 40 mEq, 40 mEq, Oral, PRN, 40 mEq at 03/23/23 1859 **OR** potassium chloride (KLOR-CON) packet 40 mEq, 40 mEq, Oral, PRN, 40 mEq at 03/24/23 1628 **OR** potassium chloride 10 mEq in 100 mL IVPB, 10 mEq, Intravenous, Q1H PRN, Rhona Hinds, APRN  •  prochlorperazine (COMPAZINE) injection 2.5 mg, 2.5 mg, Intravenous, Q6H PRN, Harshal Jackson MD, 2.5 mg at 03/22/23 0317  •  propofol (DIPRIVAN) infusion 10 mg/mL 100 mL, 5-50 mcg/kg/min, Intravenous, Titrated, Harshal Jackson MD, Last Rate: 25.6 mL/hr at 03/26/23 1330, 50 mcg/kg/min at 03/26/23 1330  •  sodium chloride 0.9 % flush 10 mL, 10 mL, Intravenous, Q12H, Harshal Jackson MD, 10 mL at 03/26/23 0825  •  sodium chloride 0.9 % flush 10 mL, 10 mL, Intravenous, PRN, Harshal Jackson MD  •  sodium chloride 0.9 % infusion 40 mL, 40 mL, Intravenous, PRN, Harshal Jackson MD  •  thiamine (B-1) 250 mg in sodium chloride 0.9 % 100 mL IVPB, 250 mg, Intravenous, TID, Harshal Jackson MD, Last Rate: 200 mL/hr at 03/26/23 1527,  250 mg at 03/26/23 1527        Diagnoses/Assessment:     Alcohol withdrawal (HCC)    Transaminitis    Delirium tremens (HCC)    Anxiety disorder    Affective disorder (HCC)        Treatment Plan:    Alcohol Withdrawal/Delirium Tremens:  --EtOH w/d & DTs are life threatening without treatment              --Cont Phenobart 100mg q8hrs    --Given improvements from yesterday, maintain today and consider taper in next 1-2 days              --Cont CIWA and PRN ativan              --Cont Ativan drip back to 5mg/hr.   maintain today and consider taper in next 1-2 days              --Cont ativan 4mg q1hrs PRN for agitation and autonomic instability.    --Optimize Thiamine to 500mg q8h for Wernicke's ppx; EtOH use and w/d are neurotoxic and may affect baseline     Anxiety & Affective Disorder:              --Will consider psychotherapeutic agents after extubation and management of the detox.      HTN:    --Continued atenolol 100mg daily.   --Treat BP elevation with PRN ativan for withdrawal given reasoning above.        Omkar Hope II, MD  03/26/23 at 15:48 CDT
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
## 2023-03-26 NOTE — PLAN OF CARE
Goal Outcome Evaluation:  Plan of Care Reviewed With: spouse        Progress: no change  Outcome Evaluation: VSS. Slight fever at beginning of shift, tylenol given and temp remains normal. UOP adequate. Resting well at this time, all needs met at this time. Pt remains on the vent with Ativan and Propofol gtt infusing.

## 2023-03-26 NOTE — PLAN OF CARE
Problem: Device-Related Complication Risk (Mechanical Ventilation, Invasive)  Goal: Optimal Device Function  Intervention: Optimize Device Care and Function  Recent Flowsheet Documentation  Taken 3/25/2023 1906 by Amalia Mendoza, RRT  Airway Safety Measures:   manual resuscitator/mask at bedside   oxygen flowmeter at bedside   suction at bedside     Problem: Inability to Wean (Mechanical Ventilation, Invasive)  Goal: Mechanical Ventilation Liberation  Outcome: Ongoing, Progressing   Goal Outcome Evaluation:               Pt tolerating current vent settings, no signs of distress noted at this time. Pt remains on CIWA and ativan drip, does not meet criteria for SBT at this time. Will continue to monitor and wean when appropriate.

## 2023-03-27 ENCOUNTER — APPOINTMENT (OUTPATIENT)
Dept: CT IMAGING | Facility: HOSPITAL | Age: 38
DRG: 896 | End: 2023-03-27
Payer: COMMERCIAL

## 2023-03-27 LAB
ALBUMIN SERPL-MCNC: 2.9 G/DL (ref 3.5–5.2)
ALBUMIN/GLOB SERPL: 1.2 G/DL
ALP SERPL-CCNC: 95 U/L (ref 39–117)
ALT SERPL W P-5'-P-CCNC: 30 U/L (ref 1–41)
ANION GAP SERPL CALCULATED.3IONS-SCNC: 8 MMOL/L (ref 5–15)
ARTERIAL PATENCY WRIST A: ABNORMAL
AST SERPL-CCNC: 27 U/L (ref 1–40)
ATMOSPHERIC PRESS: 746 MMHG
BACTERIA UR QL AUTO: ABNORMAL /HPF
BASE EXCESS BLDA CALC-SCNC: 1.9 MMOL/L (ref 0–2)
BASOPHILS # BLD AUTO: 0.07 10*3/MM3 (ref 0–0.2)
BASOPHILS NFR BLD AUTO: 0.6 % (ref 0–1.5)
BDY SITE: ABNORMAL
BILIRUB SERPL-MCNC: 0.2 MG/DL (ref 0–1.2)
BILIRUB UR QL STRIP: NEGATIVE
BUN SERPL-MCNC: 5 MG/DL (ref 6–20)
BUN/CREAT SERPL: 7.6 (ref 7–25)
CALCIUM SPEC-SCNC: 8.4 MG/DL (ref 8.6–10.5)
CHLORIDE SERPL-SCNC: 108 MMOL/L (ref 98–107)
CLARITY UR: CLEAR
CO2 SERPL-SCNC: 24 MMOL/L (ref 22–29)
COLOR UR: YELLOW
CREAT SERPL-MCNC: 0.66 MG/DL (ref 0.76–1.27)
D-LACTATE SERPL-SCNC: 1.3 MMOL/L (ref 0.5–2)
DEPRECATED RDW RBC AUTO: 54.5 FL (ref 37–54)
EGFRCR SERPLBLD CKD-EPI 2021: 124.7 ML/MIN/1.73
EOSINOPHIL # BLD AUTO: 0.08 10*3/MM3 (ref 0–0.4)
EOSINOPHIL NFR BLD AUTO: 0.7 % (ref 0.3–6.2)
ERYTHROCYTE [DISTWIDTH] IN BLOOD BY AUTOMATED COUNT: 14.6 % (ref 12.3–15.4)
GLOBULIN UR ELPH-MCNC: 2.4 GM/DL
GLUCOSE BLDC GLUCOMTR-MCNC: 105 MG/DL (ref 70–130)
GLUCOSE BLDC GLUCOMTR-MCNC: 90 MG/DL (ref 70–130)
GLUCOSE SERPL-MCNC: 105 MG/DL (ref 65–99)
GLUCOSE UR STRIP-MCNC: NEGATIVE MG/DL
HCO3 BLDA-SCNC: 26.3 MMOL/L (ref 20–26)
HCT VFR BLD AUTO: 33 % (ref 37.5–51)
HGB BLD-MCNC: 11.1 G/DL (ref 13–17.7)
HGB UR QL STRIP.AUTO: ABNORMAL
HYALINE CASTS UR QL AUTO: ABNORMAL /LPF
IMM GRANULOCYTES # BLD AUTO: 0.12 10*3/MM3 (ref 0–0.05)
IMM GRANULOCYTES NFR BLD AUTO: 1.1 % (ref 0–0.5)
INHALED O2 CONCENTRATION: 30 %
KETONES UR QL STRIP: NEGATIVE
LEUKOCYTE ESTERASE UR QL STRIP.AUTO: NEGATIVE
LYMPHOCYTES # BLD AUTO: 1.39 10*3/MM3 (ref 0.7–3.1)
LYMPHOCYTES NFR BLD AUTO: 12.7 % (ref 19.6–45.3)
Lab: ABNORMAL
MCH RBC QN AUTO: 34 PG (ref 26.6–33)
MCHC RBC AUTO-ENTMCNC: 33.6 G/DL (ref 31.5–35.7)
MCV RBC AUTO: 101.2 FL (ref 79–97)
MODALITY: ABNORMAL
MONOCYTES # BLD AUTO: 1.11 10*3/MM3 (ref 0.1–0.9)
MONOCYTES NFR BLD AUTO: 10.1 % (ref 5–12)
NEUTROPHILS NFR BLD AUTO: 74.8 % (ref 42.7–76)
NEUTROPHILS NFR BLD AUTO: 8.18 10*3/MM3 (ref 1.7–7)
NITRITE UR QL STRIP: NEGATIVE
NRBC BLD AUTO-RTO: 0 /100 WBC (ref 0–0.2)
PCO2 BLDA: 39.6 MM HG (ref 35–45)
PEEP RESPIRATORY: 5 CM[H2O]
PH BLDA: 7.43 PH UNITS (ref 7.35–7.45)
PH UR STRIP.AUTO: 7.5 [PH] (ref 5–9)
PLATELET # BLD AUTO: 177 10*3/MM3 (ref 140–450)
PMV BLD AUTO: 10.8 FL (ref 6–12)
PO2 BLDA: 84.7 MM HG (ref 83–108)
POTASSIUM SERPL-SCNC: 3.6 MMOL/L (ref 3.5–5.2)
POTASSIUM SERPL-SCNC: 4.2 MMOL/L (ref 3.5–5.2)
PROCALCITONIN SERPL-MCNC: 0.12 NG/ML (ref 0–0.25)
PROT SERPL-MCNC: 5.3 G/DL (ref 6–8.5)
PROT UR QL STRIP: NEGATIVE
RBC # BLD AUTO: 3.26 10*6/MM3 (ref 4.14–5.8)
RBC # UR STRIP: ABNORMAL /HPF
REF LAB TEST METHOD: ABNORMAL
SAO2 % BLDCOA: 97.7 % (ref 94–99)
SET MECH RESP RATE: 16
SODIUM SERPL-SCNC: 140 MMOL/L (ref 136–145)
SP GR UR STRIP: 1.01 (ref 1–1.03)
SQUAMOUS #/AREA URNS HPF: ABNORMAL /HPF
UROBILINOGEN UR QL STRIP: ABNORMAL
VENTILATOR MODE: AC
VT ON VENT VENT: 560 ML
WBC # UR STRIP: ABNORMAL /HPF
WBC NRBC COR # BLD: 10.95 10*3/MM3 (ref 3.4–10.8)

## 2023-03-27 PROCEDURE — 80053 COMPREHEN METABOLIC PANEL: CPT | Performed by: INTERNAL MEDICINE

## 2023-03-27 PROCEDURE — 85025 COMPLETE CBC W/AUTO DIFF WBC: CPT | Performed by: INTERNAL MEDICINE

## 2023-03-27 PROCEDURE — 25010000002 HYDRALAZINE PER 20 MG: Performed by: FAMILY MEDICINE

## 2023-03-27 PROCEDURE — 81001 URINALYSIS AUTO W/SCOPE: CPT | Performed by: INTERNAL MEDICINE

## 2023-03-27 PROCEDURE — 94799 UNLISTED PULMONARY SVC/PX: CPT

## 2023-03-27 PROCEDURE — 25010000002 PHENOBARBITAL PER 120 MG: Performed by: PSYCHIATRY & NEUROLOGY

## 2023-03-27 PROCEDURE — 87205 SMEAR GRAM STAIN: CPT | Performed by: INTERNAL MEDICINE

## 2023-03-27 PROCEDURE — 83605 ASSAY OF LACTIC ACID: CPT | Performed by: INTERNAL MEDICINE

## 2023-03-27 PROCEDURE — 82803 BLOOD GASES ANY COMBINATION: CPT

## 2023-03-27 PROCEDURE — 36600 WITHDRAWAL OF ARTERIAL BLOOD: CPT

## 2023-03-27 PROCEDURE — 87070 CULTURE OTHR SPECIMN AEROBIC: CPT | Performed by: INTERNAL MEDICINE

## 2023-03-27 PROCEDURE — 87040 BLOOD CULTURE FOR BACTERIA: CPT | Performed by: INTERNAL MEDICINE

## 2023-03-27 PROCEDURE — 25010000002 ENOXAPARIN PER 10 MG

## 2023-03-27 PROCEDURE — 84145 PROCALCITONIN (PCT): CPT | Performed by: INTERNAL MEDICINE

## 2023-03-27 PROCEDURE — 25010000002 PROPOFOL 10 MG/ML EMULSION

## 2023-03-27 PROCEDURE — 71250 CT THORAX DX C-: CPT

## 2023-03-27 PROCEDURE — 82962 GLUCOSE BLOOD TEST: CPT

## 2023-03-27 PROCEDURE — 99233 SBSQ HOSP IP/OBS HIGH 50: CPT | Performed by: PSYCHIATRY & NEUROLOGY

## 2023-03-27 PROCEDURE — 25010000002 THIAMINE PER 100 MG: Performed by: PSYCHIATRY & NEUROLOGY

## 2023-03-27 PROCEDURE — 87186 SC STD MICRODIL/AGAR DIL: CPT | Performed by: INTERNAL MEDICINE

## 2023-03-27 PROCEDURE — 84132 ASSAY OF SERUM POTASSIUM: CPT | Performed by: FAMILY MEDICINE

## 2023-03-27 PROCEDURE — 94003 VENT MGMT INPAT SUBQ DAY: CPT

## 2023-03-27 PROCEDURE — 87147 CULTURE TYPE IMMUNOLOGIC: CPT | Performed by: INTERNAL MEDICINE

## 2023-03-27 PROCEDURE — 25010000002 LORAZEPAM PER 2 MG: Performed by: PSYCHIATRY & NEUROLOGY

## 2023-03-27 PROCEDURE — 25010000002 LORAZEPAM PER 2 MG

## 2023-03-27 RX ADMIN — PROPOFOL 50 MCG/KG/MIN: 10 INJECTION, EMULSION INTRAVENOUS at 03:58

## 2023-03-27 RX ADMIN — THIAMINE HYDROCHLORIDE 500 MG: 100 INJECTION, SOLUTION INTRAMUSCULAR; INTRAVENOUS at 21:39

## 2023-03-27 RX ADMIN — THIAMINE HYDROCHLORIDE 500 MG: 100 INJECTION, SOLUTION INTRAMUSCULAR; INTRAVENOUS at 15:00

## 2023-03-27 RX ADMIN — SODIUM CHLORIDE, POTASSIUM CHLORIDE, SODIUM LACTATE AND CALCIUM CHLORIDE 150 ML/HR: 600; 310; 30; 20 INJECTION, SOLUTION INTRAVENOUS at 19:39

## 2023-03-27 RX ADMIN — HYDRALAZINE HYDROCHLORIDE 10 MG: 20 INJECTION INTRAMUSCULAR; INTRAVENOUS at 07:47

## 2023-03-27 RX ADMIN — POTASSIUM CHLORIDE 40 MEQ: 1.5 POWDER, FOR SOLUTION ORAL at 08:16

## 2023-03-27 RX ADMIN — SODIUM CHLORIDE, POTASSIUM CHLORIDE, SODIUM LACTATE AND CALCIUM CHLORIDE 150 ML/HR: 600; 310; 30; 20 INJECTION, SOLUTION INTRAVENOUS at 03:59

## 2023-03-27 RX ADMIN — PHENOBARBITAL SODIUM 100 MG: 130 INJECTION INTRAMUSCULAR at 18:08

## 2023-03-27 RX ADMIN — SODIUM CHLORIDE, POTASSIUM CHLORIDE, SODIUM LACTATE AND CALCIUM CHLORIDE 150 ML/HR: 600; 310; 30; 20 INJECTION, SOLUTION INTRAVENOUS at 11:26

## 2023-03-27 RX ADMIN — CHLORHEXIDINE GLUCONATE 0.12% ORAL RINSE 15 ML: 1.2 LIQUID ORAL at 20:37

## 2023-03-27 RX ADMIN — Medication 10 ML: at 20:37

## 2023-03-27 RX ADMIN — PANTOPRAZOLE SODIUM 40 MG: 40 INJECTION, POWDER, FOR SOLUTION INTRAVENOUS at 05:11

## 2023-03-27 RX ADMIN — PROPOFOL 50 MCG/KG/MIN: 10 INJECTION, EMULSION INTRAVENOUS at 15:42

## 2023-03-27 RX ADMIN — THIAMINE HYDROCHLORIDE 500 MG: 100 INJECTION, SOLUTION INTRAMUSCULAR; INTRAVENOUS at 08:16

## 2023-03-27 RX ADMIN — POTASSIUM CHLORIDE 40 MEQ: 1.5 POWDER, FOR SOLUTION ORAL at 05:11

## 2023-03-27 RX ADMIN — PROPOFOL 50 MCG/KG/MIN: 10 INJECTION, EMULSION INTRAVENOUS at 22:06

## 2023-03-27 RX ADMIN — PROPOFOL 50 MCG/KG/MIN: 10 INJECTION, EMULSION INTRAVENOUS at 08:16

## 2023-03-27 RX ADMIN — Medication 10 ML: at 08:06

## 2023-03-27 RX ADMIN — ENOXAPARIN SODIUM 40 MG: 40 INJECTION SUBCUTANEOUS at 08:05

## 2023-03-27 RX ADMIN — LORAZEPAM 4 MG: 2 INJECTION INTRAMUSCULAR; INTRAVENOUS at 20:37

## 2023-03-27 RX ADMIN — ATENOLOL 100 MG: 50 TABLET ORAL at 08:05

## 2023-03-27 RX ADMIN — ACETAMINOPHEN 650 MG: 325 TABLET ORAL at 23:48

## 2023-03-27 RX ADMIN — PHENOBARBITAL SODIUM 100 MG: 130 INJECTION INTRAMUSCULAR at 00:44

## 2023-03-27 RX ADMIN — PROPOFOL 50 MCG/KG/MIN: 10 INJECTION, EMULSION INTRAVENOUS at 19:38

## 2023-03-27 RX ADMIN — LORAZEPAM 4 MG: 2 INJECTION INTRAMUSCULAR; INTRAVENOUS at 12:51

## 2023-03-27 RX ADMIN — CHLORHEXIDINE GLUCONATE 0.12% ORAL RINSE 15 ML: 1.2 LIQUID ORAL at 08:06

## 2023-03-27 RX ADMIN — PHENOBARBITAL SODIUM 100 MG: 130 INJECTION INTRAMUSCULAR at 08:32

## 2023-03-27 RX ADMIN — PROPOFOL 50 MCG/KG/MIN: 10 INJECTION, EMULSION INTRAVENOUS at 11:13

## 2023-03-27 RX ADMIN — LORAZEPAM 5 MG/HR: 2 INJECTION INTRAMUSCULAR; INTRAVENOUS at 21:35

## 2023-03-27 RX ADMIN — NICOTINE 1 PATCH: 21 PATCH, EXTENDED RELEASE TRANSDERMAL at 03:36

## 2023-03-27 RX ADMIN — LORAZEPAM 4 MG: 2 INJECTION INTRAMUSCULAR; INTRAVENOUS at 23:48

## 2023-03-27 NOTE — PLAN OF CARE
Goal Outcome Evaluation:  Plan of Care Reviewed With: spouse        Progress: no change  Outcome Evaluation: VSS> UOP adequate. 1 dose of PRN tylenol given for elevated temp. Pt remains intubated and sedated. Propofol and ativan gtt infusing. K+ of 3.6, first dose replaced. No change in vent settings. Patient resting, all needs met at this time.
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
## 2023-03-27 NOTE — PROGRESS NOTES
Psychiatry Progress Note    3/27/2023    Chief Complaint: delirium tremens    Subjective:  Patient is a 37 y.o. male who was hospitalized for delirium tremens.    Remains intubated.  Does not meaningfully interact.     No family at bedside.     BP increased at times, HR normative; T max of 100.8F.           Objective     Vital Signs    Vitals:    03/27/23 1500 03/27/23 1529 03/27/23 1615 03/27/23 1700   BP: 145/65  172/80 125/60   BP Location:   Right leg    Patient Position:   Lying    Pulse: 80 79 80 75   Resp:  17 18    Temp:   99.1 °F (37.3 °C)    TempSrc:   Oral    SpO2: 96% 97% 100% 95%   Weight:       Height:           Physical Exam: as of today, 03/27/23   General Appearance: intubated and sedated  Hygiene:   fair  Gait & Station: in bed intubated and sedated  Musculoskeletal: No tremors or abnormal involuntary movements    Mental Status Exam: as of today, 03/27/23   Cooperation:  intubated and sedated  Eye Contact:  Closed  Psychomotor Behavior:  intubated and sedated  Mood: intubated and sedated and unable to answer  Affect:  Unable to obtain given sedation  Speech:  intubated and nonverbal  Thought Process:  intubated and nonverbal  Associations: intubated and nonverbal  Thought Content:     intubated and nonverbal   Suicidal:  None evidenced   Homicidal:  None evidenced   Hallucinations:  Not demonstrated today due to being intubated   Delusion:  Unable to demonstrate  Cognitive Functioning:   Consciousness: intubated and sedated  Reliability:  unable to assess due to being intubated  Insight:  unable to assess due to being intubated  Judgement:  unable to assess due to being intubated  Impulse Control:  unable to assess due to being intubated    Lab Results: Results source: EMR   Lab Results (last 24 hours)     Procedure Component Value Units Date/Time    Urinalysis, Microscopic Only - Indwelling Urethral Catheter [[id number]]  (Abnormal) Collected: 03/27/23 1459    Specimen: Urine from Indwelling  Urethral Catheter Updated: 03/27/23 1518     RBC, UA 3-5 /HPF      WBC, UA 0-2 /HPF      Comment: Urine culture not indicated.        Bacteria, UA None Seen /HPF      Squamous Epithelial Cells, UA None Seen /HPF      Hyaline Casts, UA None Seen /LPF      Methodology Automated Microscopy    Urinalysis With Culture If Indicated - Indwelling Urethral Catheter [[id number]]  (Abnormal) Collected: 03/27/23 1459    Specimen: Urine from Indwelling Urethral Catheter Updated: 03/27/23 1516     Color, UA Yellow     Appearance, UA Clear     pH, UA 7.5     Specific Gravity, UA 1.008     Glucose, UA Negative     Ketones, UA Negative     Bilirubin, UA Negative     Blood, UA Trace     Protein, UA Negative     Leuk Esterase, UA Negative     Nitrite, UA Negative     Urobilinogen, UA 0.2 E.U./dL    Narrative:      In absence of clinical symptoms, the presence of pyuria, bacteria, and/or nitrites on the urinalysis result does not correlate with infection.    Respiratory Culture - Aspirate, ET Suction [[id number]] Collected: 03/27/23 1333    Specimen: Aspirate from ET Suction Updated: 03/27/23 1355     Gram Stain Moderate (3+) WBCs per low power field      Rare (1+) Epithelial cells per low power field      Mixed bacterial derrick    Procalcitonin [[id number]]  (Normal) Collected: 03/27/23 1311    Specimen: Blood Updated: 03/27/23 1354     Procalcitonin 0.12 ng/mL     Narrative:      As a Marker for Sepsis (Non-Neonates):    1. <0.5 ng/mL represents a low risk of severe sepsis and/or septic shock.  2. >2 ng/mL represents a high risk of severe sepsis and/or septic shock.    As a Marker for Lower Respiratory Tract Infections that require antibiotic therapy:    PCT on Admission    Antibiotic Therapy       6-12 Hrs later    >0.5                Strongly Recommended  >0.25 - <0.5        Recommended   0.1 - 0.25          Discouraged              Remeasure/reassess PCT  <0.1                Strongly Discouraged     Remeasure/reassess PCT    As 28 day  "mortality risk marker: \"Change in Procalcitonin Result\" (>80% or <=80%) if Day 0 (or Day 1) and Day 4 values are available. Refer to http://[email or web address]-pct-calculator.com    Change in PCT <=80%  A decrease of PCT levels below or equal to 80% defines a positive change in PCT test result representing a higher risk for 28-day all-cause mortality of patients diagnosed with severe sepsis for septic shock.    Change in PCT >80%  A decrease of PCT levels of more than 80% defines a negative change in PCT result representing a lower risk for 28-day all-cause mortality of patients diagnosed with severe sepsis or septic shock.       Lactic Acid, Plasma [[id number]]  (Normal) Collected: 03/27/23 1311    Specimen: Blood from Arm, Left Updated: 03/27/23 1342     Lactate 1.3 mmol/L     Blood Culture - Blood, Arm, Left [[id number]] Collected: 03/27/23 1311    Specimen: Blood from Arm, Left Updated: 03/27/23 1325    Blood Culture - Blood, Wrist, Right [[id number]] Collected: 03/27/23 1310    Specimen: Blood from Wrist, Right Updated: 03/27/23 1324    POC Glucose Once [[id number]]  (Normal) Collected: 03/27/23 1126    Specimen: Blood Updated: 03/27/23 1308     Glucose 90 mg/dL      Comment: : [id number] MICHELLE AUSTINMeter ID: [id number]       Potassium [[id number]]  (Normal) Collected: 03/27/23 1218    Specimen: Blood Updated: 03/27/23 1245     Potassium 4.2 mmol/L     Comprehensive Metabolic Panel [[id number]]  (Abnormal) Collected: 03/27/23 0356    Specimen: Blood Updated: 03/27/23 0442     Glucose 105 mg/dL      BUN 5 mg/dL      Creatinine 0.66 mg/dL      Sodium 140 mmol/L      Potassium 3.6 mmol/L      Chloride 108 mmol/L      CO2 24.0 mmol/L      Calcium 8.4 mg/dL      Total Protein 5.3 g/dL      Albumin 2.9 g/dL      ALT (SGPT) 30 U/L      AST (SGOT) 27 U/L      Alkaline Phosphatase 95 U/L      Total Bilirubin 0.2 mg/dL      Globulin 2.4 gm/dL      A/G Ratio 1.2 g/dL      BUN/Creatinine Ratio 7.6     Anion Gap 8.0 mmol/L      " eGFR 124.7 mL/min/1.73     Narrative:      GFR Normal >60  Chronic Kidney Disease <60  Kidney Failure <15      Blood Gas, Arterial - [[id number]]  (Abnormal) Collected: 03/27/23 0428    Specimen: Arterial Blood Updated: 03/27/23 0430     Site Left Radial     Kush's Test N/A     pH, Arterial 7.431 pH units      pCO2, Arterial 39.6 mm Hg      pO2, Arterial 84.7 mm Hg      HCO3, Arterial 26.3 mmol/L      Comment: 83 Value above reference range        Base Excess, Arterial 1.9 mmol/L      O2 Saturation, Arterial 97.7 %      Barometric Pressure for Blood Gas 746 mmHg      Modality Ventilator     FIO2 30 %      Ventilator Mode AC     Set Tidal Volume 560     Set Mech Resp Rate 16.0     PEEP 5.0     Collected by      Comment: Meter: [id number]     :  [id number]       CBC & Differential [[id number]]  (Abnormal) Collected: 03/27/23 0356    Specimen: Blood Updated: 03/27/23 0412    Narrative:      The following orders were created for panel order CBC & Differential.  Procedure                               Abnormality         Status                     ---------                               -----------         ------                     CBC Auto Differential[[id number]]        Abnormal            Final result                 Please view results for these tests on the individual orders.    CBC Auto Differential [[id number]]  (Abnormal) Collected: 03/27/23 0356    Specimen: Blood Updated: 03/27/23 0412     WBC 10.95 10*3/mm3      RBC 3.26 10*6/mm3      Hemoglobin 11.1 g/dL      Hematocrit 33.0 %      .2 fL      MCH 34.0 pg      MCHC 33.6 g/dL      RDW 14.6 %      RDW-SD 54.5 fl      MPV 10.8 fL      Platelets 177 10*3/mm3      Neutrophil % 74.8 %      Lymphocyte % 12.7 %      Monocyte % 10.1 %      Eosinophil % 0.7 %      Basophil % 0.6 %      Immature Grans % 1.1 %      Neutrophils, Absolute 8.18 10*3/mm3      Lymphocytes, Absolute 1.39 10*3/mm3      Monocytes, Absolute 1.11 10*3/mm3      Eosinophils,  Absolute 0.08 10*3/mm3      Basophils, Absolute 0.07 10*3/mm3      Immature Grans, Absolute 0.12 10*3/mm3      nRBC 0.0 /100 WBC           Radiology Results:  Imaging Results (Last 24 Hours)     Procedure Component Value Units Date/Time    CT Chest Without Contrast Diagnostic [[id number]] Resulted: 03/27/23 1557     Updated: 03/27/23 1558          Medicine:   Current Facility-Administered Medications:   •  acetaminophen (TYLENOL) tablet 650 mg, 650 mg, Oral, Q4H PRN, 650 mg at 03/26/23 2340 **OR** [DISCONTINUED] acetaminophen (TYLENOL) 160 MG/5ML solution 650 mg, 650 mg, Oral, Q4H PRN **OR** acetaminophen (TYLENOL) suppository 650 mg, 650 mg, Rectal, Q4H PRN, Harshal Jackson MD  •  atenolol (TENORMIN) tablet 100 mg, 100 mg, Oral, Daily, Harshal Jackson MD, 100 mg at 03/27/23 0805  •  chlorhexidine (PERIDEX) 0.12 % solution 15 mL, 15 mL, Mouth/Throat, Q12H, Bertrand Conteh MD, 15 mL at 03/27/23 0806  •  Enoxaparin Sodium (LOVENOX) syringe 40 mg, 40 mg, Subcutaneous, Daily, Harshal Jackson MD, 40 mg at 03/27/23 0805  •  hydrALAZINE (APRESOLINE) injection 10 mg, 10 mg, Intravenous, Q6H PRN, William Shook MD, 10 mg at 03/27/23 0747  •  lactated ringers infusion, 150 mL/hr, Intravenous, Continuous, Harshal Jackson MD, Last Rate: 150 mL/hr at 03/27/23 1126, 150 mL/hr at 03/27/23 1126  •  LORazepam (ATIVAN) 100 mg in sodium chloride 0.9 % 100 mL infusion, 0.5-10 mg/hr, Intravenous, Titrated, Harshal Jackson MD, Last Rate: 5 mL/hr at 03/26/23 2130, 5 mg/hr at 03/26/23 2130  •  LORazepam (ATIVAN) tablet 1 mg, 1 mg, Oral, Q2H PRN **OR** LORazepam (ATIVAN) injection 1 mg, 1 mg, Intravenous, Q2H PRN, 1 mg at 03/23/23 1921 **OR** LORazepam (ATIVAN) tablet 2 mg, 2 mg, Oral, Q1H PRN **OR** LORazepam (ATIVAN) injection 2 mg, 2 mg, Intravenous, Q1H PRN, 2 mg at 03/25/23 1539 **OR** LORazepam (ATIVAN) injection 2 mg, 2 mg, Intravenous, Q15 Min PRN, 2 mg at 03/23/23 2321 **OR** LORazepam  (ATIVAN) injection 2 mg, 2 mg, Intramuscular, Q15 Min PRN, Harshal Jackson MD  •  LORazepam (ATIVAN) injection 4 mg, 4 mg, Intravenous, Q1H PRN, Tanya Perez MD, 4 mg at 03/27/23 1251  •  Magnesium Sulfate - Total Dose 10 grams - Magnesium 1 or Less, 2 g, Intravenous, PRN **OR** Magnesium Sulfate - Total Dose 6 grams - Magnesium 1.1 - 1.5, 2 g, Intravenous, PRN **OR** Magnesium Sulfate - Total Dose 4 grams - Magnesium 1.6 - 1.9, 4 g, Intravenous, PRN, Rhona Hinds, APRN, Last Rate: 25 mL/hr at 03/23/23 1226, 4 g at 03/23/23 1226  •  morphine injection 2 mg, 2 mg, Intravenous, Q4H PRN **AND** naloxone (NARCAN) injection 0.4 mg, 0.4 mg, Intravenous, Q5 Min PRN, Harshal Jackson MD  •  nicotine (NICODERM CQ) 21 MG/24HR patch 1 patch, 1 patch, Transdermal, Q24H, Harshal Jackson MD, 1 patch at 03/27/23 0336  •  ondansetron (ZOFRAN) tablet 4 mg, 4 mg, Oral, Q6H PRN **OR** ondansetron (ZOFRAN) injection 4 mg, 4 mg, Intravenous, Q6H PRN, Harshal Jackson MD  •  pantoprazole (PROTONIX) injection 40 mg, 40 mg, Intravenous, Q AM, Bertrand Conteh MD, 40 mg at 03/27/23 0511  •  PHENobarbital 100 mg in sodium chloride 0.9 % 100 mL IVPB, 100 mg, Intravenous, Q8H, Tanya Perez MD, 100 mg at 03/27/23 0832  •  potassium chloride (MICRO-K) CR capsule 40 mEq, 40 mEq, Oral, PRN, 40 mEq at 03/23/23 1859 **OR** potassium chloride (KLOR-CON) packet 40 mEq, 40 mEq, Oral, PRN, 40 mEq at 03/27/23 0816 **OR** potassium chloride 10 mEq in 100 mL IVPB, 10 mEq, Intravenous, Q1H PRN, Rhona Hinds APRN  •  prochlorperazine (COMPAZINE) injection 2.5 mg, 2.5 mg, Intravenous, Q6H PRN, Harshal Jackson MD, 2.5 mg at 03/22/23 0317  •  propofol (DIPRIVAN) infusion 10 mg/mL 100 mL, 5-50 mcg/kg/min, Intravenous, Titrated, Harshal Jackson MD, Last Rate: 25.6 mL/hr at 03/27/23 1542, 50 mcg/kg/min at 03/27/23 1542  •  sodium chloride 0.9 % flush 10 mL, 10 mL, Intravenous, Q12H, Harshal Jackson  MD Kyrie, 10 mL at 03/27/23 0806  •  sodium chloride 0.9 % flush 10 mL, 10 mL, Intravenous, PRN, Harshal Jackson MD  •  sodium chloride 0.9 % infusion 40 mL, 40 mL, Intravenous, PRN, Harshal Jackson MD  •  thiamine (B-1) 500 mg in sodium chloride 0.9 % 100 mL IVPB, 500 mg, Intravenous, TID, Omkar Hope II, MD, Last Rate: 200 mL/hr at 03/27/23 1500, 500 mg at 03/27/23 1500        Diagnoses/Assessment:     Alcohol withdrawal (HCC)    Transaminitis    Delirium tremens (HCC)    Anxiety disorder    Affective disorder (HCC)        Treatment Plan:    Alcohol Withdrawal/Delirium Tremens:  --EtOH w/d & DTs are life threatening without treatment              --Taper Phenobarb to 65mg q8hrs               --Cont CIWA and PRN ativan              --Cont Ativan drip at 5mg/hr.   If does well w/ phenobarb decrease, could consider lowering; alternatively, when able to be off vent, can change to pushes, if needed.              --Cont ativan 4mg q1hrs PRN for agitation and autonomic instability.    --Cont Thiamine 500mg q8h for Wernicke's ppx; EtOH use and w/d are neurotoxic and may affect baseline     Anxiety & Affective Disorder:              --Will consider psychotherapeutic agents after extubation and management of the detox.      HTN:    --Continued atenolol 100mg daily.   --Treat BP elevation with PRN ativan for withdrawal given reasoning above.      Fever: defer to and concur w/ treatment by primary team and sepsis w/u.       Omkar Hope II, MD  03/27/23 at 18:05 CDT

## 2023-03-27 NOTE — PROGRESS NOTES
Adult Nutrition  Assessment/PES    Patient Name:  Jackson Acuña  YOB: 1985  MRN: [id number]  Admit Date:  3/22/2023    Assessment Date:  3/27/2023    Comments: Pt remains in vent, intubated 3/23. 3/22/23 188#- # w/ noted 1+ general edema and 2+ edema hands. Reported -205# on admit. IV thiamine/phenobarb and CIWA in place- propofol increased to 25.6 (+676cal). Noted last BM recorded 3/25. Cortrak placed 3/24 and TF initiated. Peptamen AF at goal rate 50ml/hr and free water flush (+600ml) to provide 1440cal (+676cal propofol) = 2116cal, 91g pro and 1572ml (+600ml) = 1572ml/day. RD to follow hospital course.     Reason for Assessment     Row Name 03/27/23 1116          Reason for Assessment    Reason For Assessment follow-up protocol     Diagnosis substance use/abuse;pulmonary disease     Identified At Risk by Screening Criteria tube feeding or parenteral nutrition                Nutrition/Diet History     Row Name 03/27/23 1116          Nutrition/Diet History    Typical Intake (Food/Fluid/EN/PN) RN notes no problems notd w/ TF                Labs/Tests/Procedures/Meds     Row Name 03/27/23 1117          Labs/Procedures/Meds    Lab Results Reviewed reviewed     Lab Results Comments Glu 105, alb 2.9L        Diagnostic Tests/Procedures    Diagnostic Test/Procedure Reviewed reviewed     Diagnostic Test/Procedures Comments vent        Medications    Pertinent Medications Reviewed reviewed     Pertinent Medications Comments CIWA/IV thiamine/penobarbital, IVF, ativan drip, propofol                  Estimated/Assessed Needs - Anthropometrics     Row Name 03/27/23 0500          Anthropometrics    Weight 82.2 kg (181 lb 3.5 oz)                Nutrition Prescription Ordered     Row Name 03/27/23 1118          Nutrition Prescription PO    Current PO Diet NPO        Nutrition Prescription EN    Enteral Route ND     Product Peptamen AF (Vital AF 1.2)     TF Delivery Method Continuous     Continuous TF  Goal Rate (mL/hr) 50 mL/hr     Water flush (mL)  25 mL     Water Flush Frequency Per hour        Propofol Considerations    Propofol (mL/hr) 25.6 mL/hr     Propofol (Kcal/day) 676 Kcal/day                Evaluation of Received Nutrient/Fluid Intake     Row Name 03/27/23 1118          Calories Evaluation    Total Calorie Target (kcal) 2150     Oral Calories (kcal) 0     Enteral Calories (kcal) 1440     Parenteral Calories (kcal) 0     Other Calories (kcal) 676  propofol     Total Calories (kcal) 2116     % of Kcal Needs 98.42        Protein Evaluation    Total Protein Target (g) 85     Oral Protein (g) 0     Enteral Protein (g) 91     Parenteral Protein (g) 0     Other Protein (g) 0     Total Protein (g) 91     % of Protein Needs 107.06        Intake Assessment    Energy/Calorie Requirement Assessment meeting needs     Protein Requirement Assessment meeting needs                   Problem/Interventions:   Problem 1     Row Name 03/27/23 1120          Nutrition Diagnoses Problem 1    Problem 1 Predicted Suboptimal Intake     Etiology (related to) Medical Diagnosis;Factors Affecting Nutrition     Substance Use ETOH;Drug/illicit/recreational     Reported/Observed By Patient     Oral Chewing Difficulty     Reported GI Symptoms N & V     Resolved? --  pended--pt now on vent                Problem 2     Row Name 03/27/23 1120          Nutrition Diagnoses Problem 2    Problem 2 Needs Alternate Route     Etiology (related to) Medical Diagnosis     Pulmonary/Critical Care Ventilator     Substance Use ETOH;Drug/illicit/recreational     Signs/Symptoms (evidenced by) NPO                    Intervention Goal     Row Name 03/27/23 1124          Intervention Goal    General Maintain nutrition;Nutrition support treatment;Meet nutritional needs for age/condition;Reduce/improve symptoms     TF/PN Maintain TF/PN     Weight Maintain weight                Nutrition Intervention     Row Name 03/27/23 1124          Nutrition Intervention     LEYDA/Tech Action Follow Tx progress;Care plan reviewd                  Education/Evaluation     Row Name 03/27/23 1124          Monitor/Evaluation    Monitor I&O;Pertinent labs;TF delivery/tolerance;Weight;Skin status;Symptoms     Education Follow-up Reinforce PRN                 Electronically signed by:  Verito Clay RD  03/27/23 11:25 CDT

## 2023-03-28 LAB
ANION GAP SERPL CALCULATED.3IONS-SCNC: 9 MMOL/L (ref 5–15)
BASOPHILS # BLD AUTO: 0.06 10*3/MM3 (ref 0–0.2)
BASOPHILS NFR BLD AUTO: 0.7 % (ref 0–1.5)
BUN SERPL-MCNC: 5 MG/DL (ref 6–20)
BUN/CREAT SERPL: 6.7 (ref 7–25)
CALCIUM SPEC-SCNC: 8.7 MG/DL (ref 8.6–10.5)
CHLORIDE SERPL-SCNC: 104 MMOL/L (ref 98–107)
CO2 SERPL-SCNC: 25 MMOL/L (ref 22–29)
CREAT SERPL-MCNC: 0.75 MG/DL (ref 0.76–1.27)
DEPRECATED RDW RBC AUTO: 56.7 FL (ref 37–54)
EGFRCR SERPLBLD CKD-EPI 2021: 119.9 ML/MIN/1.73
EOSINOPHIL # BLD AUTO: 0.11 10*3/MM3 (ref 0–0.4)
EOSINOPHIL NFR BLD AUTO: 1.4 % (ref 0.3–6.2)
ERYTHROCYTE [DISTWIDTH] IN BLOOD BY AUTOMATED COUNT: 14.6 % (ref 12.3–15.4)
GLUCOSE BLDC GLUCOMTR-MCNC: 102 MG/DL (ref 70–130)
GLUCOSE BLDC GLUCOMTR-MCNC: 108 MG/DL (ref 70–130)
GLUCOSE BLDC GLUCOMTR-MCNC: 110 MG/DL (ref 70–130)
GLUCOSE SERPL-MCNC: 104 MG/DL (ref 65–99)
HCT VFR BLD AUTO: 33.7 % (ref 37.5–51)
HGB BLD-MCNC: 11 G/DL (ref 13–17.7)
IMM GRANULOCYTES # BLD AUTO: 0.09 10*3/MM3 (ref 0–0.05)
IMM GRANULOCYTES NFR BLD AUTO: 1.1 % (ref 0–0.5)
LYMPHOCYTES # BLD AUTO: 1.23 10*3/MM3 (ref 0.7–3.1)
LYMPHOCYTES NFR BLD AUTO: 15.3 % (ref 19.6–45.3)
MCH RBC QN AUTO: 33.8 PG (ref 26.6–33)
MCHC RBC AUTO-ENTMCNC: 32.6 G/DL (ref 31.5–35.7)
MCV RBC AUTO: 103.7 FL (ref 79–97)
MONOCYTES # BLD AUTO: 1.17 10*3/MM3 (ref 0.1–0.9)
MONOCYTES NFR BLD AUTO: 14.6 % (ref 5–12)
NEUTROPHILS NFR BLD AUTO: 5.38 10*3/MM3 (ref 1.7–7)
NEUTROPHILS NFR BLD AUTO: 66.9 % (ref 42.7–76)
NRBC BLD AUTO-RTO: 0 /100 WBC (ref 0–0.2)
PLATELET # BLD AUTO: 218 10*3/MM3 (ref 140–450)
PMV BLD AUTO: 10.7 FL (ref 6–12)
POTASSIUM SERPL-SCNC: 3.8 MMOL/L (ref 3.5–5.2)
RBC # BLD AUTO: 3.25 10*6/MM3 (ref 4.14–5.8)
SODIUM SERPL-SCNC: 138 MMOL/L (ref 136–145)
WBC NRBC COR # BLD: 8.04 10*3/MM3 (ref 3.4–10.8)

## 2023-03-28 PROCEDURE — 94799 UNLISTED PULMONARY SVC/PX: CPT

## 2023-03-28 PROCEDURE — 99233 SBSQ HOSP IP/OBS HIGH 50: CPT | Performed by: PSYCHIATRY & NEUROLOGY

## 2023-03-28 PROCEDURE — 25010000002 VANCOMYCIN 10 G RECONSTITUTED SOLUTION: Performed by: HOSPITALIST

## 2023-03-28 PROCEDURE — 25010000002 PROPOFOL 10 MG/ML EMULSION

## 2023-03-28 PROCEDURE — 80048 BASIC METABOLIC PNL TOTAL CA: CPT | Performed by: INTERNAL MEDICINE

## 2023-03-28 PROCEDURE — 94760 N-INVAS EAR/PLS OXIMETRY 1: CPT

## 2023-03-28 PROCEDURE — 25010000002 LORAZEPAM PER 2 MG: Performed by: PSYCHIATRY & NEUROLOGY

## 2023-03-28 PROCEDURE — 25010000002 THIAMINE PER 100 MG: Performed by: PSYCHIATRY & NEUROLOGY

## 2023-03-28 PROCEDURE — 25010000002 PHENOBARBITAL PER 120 MG: Performed by: PSYCHIATRY & NEUROLOGY

## 2023-03-28 PROCEDURE — 94761 N-INVAS EAR/PLS OXIMETRY MLT: CPT

## 2023-03-28 PROCEDURE — 25010000002 ENOXAPARIN PER 10 MG

## 2023-03-28 PROCEDURE — 85025 COMPLETE CBC W/AUTO DIFF WBC: CPT | Performed by: INTERNAL MEDICINE

## 2023-03-28 PROCEDURE — 82962 GLUCOSE BLOOD TEST: CPT

## 2023-03-28 PROCEDURE — 25010000002 LORAZEPAM PER 2 MG

## 2023-03-28 PROCEDURE — 94003 VENT MGMT INPAT SUBQ DAY: CPT

## 2023-03-28 RX ADMIN — SODIUM CHLORIDE, POTASSIUM CHLORIDE, SODIUM LACTATE AND CALCIUM CHLORIDE 150 ML/HR: 600; 310; 30; 20 INJECTION, SOLUTION INTRAVENOUS at 05:42

## 2023-03-28 RX ADMIN — CHLORHEXIDINE GLUCONATE 0.12% ORAL RINSE 15 ML: 1.2 LIQUID ORAL at 20:31

## 2023-03-28 RX ADMIN — VANCOMYCIN HYDROCHLORIDE 1750 MG: 10 INJECTION, POWDER, LYOPHILIZED, FOR SOLUTION INTRAVENOUS at 15:46

## 2023-03-28 RX ADMIN — PHENOBARBITAL SODIUM 65 MG: 65 INJECTION INTRAMUSCULAR at 01:17

## 2023-03-28 RX ADMIN — PROPOFOL 50 MCG/KG/MIN: 10 INJECTION, EMULSION INTRAVENOUS at 05:42

## 2023-03-28 RX ADMIN — ATENOLOL 100 MG: 50 TABLET ORAL at 08:00

## 2023-03-28 RX ADMIN — PROPOFOL 50 MCG/KG/MIN: 10 INJECTION, EMULSION INTRAVENOUS at 17:57

## 2023-03-28 RX ADMIN — THIAMINE HYDROCHLORIDE 500 MG: 100 INJECTION, SOLUTION INTRAMUSCULAR; INTRAVENOUS at 20:31

## 2023-03-28 RX ADMIN — PROPOFOL 50 MCG/KG/MIN: 10 INJECTION, EMULSION INTRAVENOUS at 09:32

## 2023-03-28 RX ADMIN — PHENOBARBITAL SODIUM 65 MG: 65 INJECTION INTRAMUSCULAR at 09:31

## 2023-03-28 RX ADMIN — SODIUM CHLORIDE, POTASSIUM CHLORIDE, SODIUM LACTATE AND CALCIUM CHLORIDE 150 ML/HR: 600; 310; 30; 20 INJECTION, SOLUTION INTRAVENOUS at 12:57

## 2023-03-28 RX ADMIN — THIAMINE HYDROCHLORIDE 500 MG: 100 INJECTION, SOLUTION INTRAMUSCULAR; INTRAVENOUS at 15:46

## 2023-03-28 RX ADMIN — CHLORHEXIDINE GLUCONATE 0.12% ORAL RINSE 15 ML: 1.2 LIQUID ORAL at 08:00

## 2023-03-28 RX ADMIN — ENOXAPARIN SODIUM 40 MG: 40 INJECTION SUBCUTANEOUS at 08:00

## 2023-03-28 RX ADMIN — LORAZEPAM 4 MG: 2 INJECTION INTRAMUSCULAR; INTRAVENOUS at 17:03

## 2023-03-28 RX ADMIN — PROPOFOL 50 MCG/KG/MIN: 10 INJECTION, EMULSION INTRAVENOUS at 02:05

## 2023-03-28 RX ADMIN — NICOTINE 1 PATCH: 21 PATCH, EXTENDED RELEASE TRANSDERMAL at 04:19

## 2023-03-28 RX ADMIN — SODIUM CHLORIDE, POTASSIUM CHLORIDE, SODIUM LACTATE AND CALCIUM CHLORIDE 150 ML/HR: 600; 310; 30; 20 INJECTION, SOLUTION INTRAVENOUS at 22:38

## 2023-03-28 RX ADMIN — LORAZEPAM 5 MG/HR: 2 INJECTION INTRAMUSCULAR; INTRAVENOUS at 17:03

## 2023-03-28 RX ADMIN — THIAMINE HYDROCHLORIDE 500 MG: 100 INJECTION, SOLUTION INTRAMUSCULAR; INTRAVENOUS at 08:00

## 2023-03-28 RX ADMIN — Medication 10 ML: at 08:00

## 2023-03-28 RX ADMIN — PANTOPRAZOLE SODIUM 40 MG: 40 INJECTION, POWDER, FOR SOLUTION INTRAVENOUS at 05:43

## 2023-03-28 RX ADMIN — PROPOFOL 50 MCG/KG/MIN: 10 INJECTION, EMULSION INTRAVENOUS at 22:37

## 2023-03-28 RX ADMIN — PHENOBARBITAL SODIUM 65 MG: 65 INJECTION INTRAMUSCULAR at 17:03

## 2023-03-28 RX ADMIN — PROPOFOL 50 MCG/KG/MIN: 10 INJECTION, EMULSION INTRAVENOUS at 12:57

## 2023-03-28 NOTE — PLAN OF CARE
Goal Outcome Evaluation:  Plan of Care Reviewed With: spouse        Progress: no change  Outcome Evaluation: VSS, 1 dose of PRN Tylenol for elevated temp, temp WNL. Pt remains on propofol gtt and intubated at this time. PRN Ativan given for high BP. Patient resting at this time, all needs met.

## 2023-03-28 NOTE — PROGRESS NOTES
"  Pharmacokinetics by Pharmacy - Vancomycin    Jackson Acuña is a 37 y.o. male   [Ht: 182.9 cm (72\"); Wt: 92.2 kg (203 lb 4.2 oz)] is being started on Vancomycin for PNA. Consult is for 7 days.     Estimated Creatinine Clearance: 177.6 mL/min (A) (by C-G formula based on SCr of 0.75 mg/dL (L)).   Creatinine   Date Value Ref Range Status   03/28/2023 0.75 (L) 0.76 - 1.27 mg/dL Final   03/27/2023 0.66 (L) 0.76 - 1.27 mg/dL Final   03/26/2023 0.85 0.76 - 1.27 mg/dL Final      Lab Results   Component Value Date    WBC 8.04 03/28/2023    WBC 10.95 (H) 03/27/2023    WBC 11.62 (H) 03/26/2023      Temp Readings from Last 1 Encounters:   03/28/23 97.8 °F (36.6 °C) (Oral)      C-Reactive Protein   Date Value Ref Range Status   06/16/2021 0.59 (H) 0.00 - 0.50 mg/dL Final     Lactate   Date Value Ref Range Status   03/27/2023 1.3 0.5 - 2.0 mmol/L Final          Baseline culture results:  Microbiology Results (last 10 days)       Procedure Component Value - Date/Time    Respiratory Culture - Aspirate, ET Suction [[id number]]  (Abnormal) Collected: 03/27/23 1333    Lab Status: Preliminary result Specimen: Aspirate from ET Suction Updated: 03/28/23 0959     Respiratory Culture Heavy growth (4+) Staphylococcus aureus      Light growth (2+) Normal Respiratory Derrick     Gram Stain Moderate (3+) WBCs per low power field      Rare (1+) Epithelial cells per low power field      Mixed bacterial derrick    Blood Culture - Blood, Arm, Left [[id number]]  (Normal) Collected: 03/27/23 1311    Lab Status: Preliminary result Specimen: Blood from Arm, Left Updated: 03/28/23 1330     Blood Culture No growth at 24 hours    Blood Culture - Blood, Wrist, Right [[id number]]  (Normal) Collected: 03/27/23 1310    Lab Status: Preliminary result Specimen: Blood from Wrist, Right Updated: 03/28/23 1330     Blood Culture No growth at 24 hours            Respiratory Cultures are pending. Staph Aureus in preliminary report    Plan  Initiated Vancomycin 1750 mg x 1 " dose was given at 3/28 at 1546 then was dosed at 1750 mg IVPB Q12H to start in the am at 0330. Will order Vancomycin peak level 3/29 at 1830 and  trough level 3/30 at 0300.  Calculated AUC is 519.90 (goal 400-600), calculated trough level is 12.14  (goal 10-20) and calculated peak level is 34.34 (goal 30-40).  Pharmacy will monitor renal function and adjust dose accordingly.    Deana Gar, PharmD  03/28/23 15:52 CDT

## 2023-03-28 NOTE — PLAN OF CARE
Goal Outcome Evaluation:      Pt remains intubated and sedated. Uop adequate. Afebrile. Pt on ativan and LR gtt. Pt given one prn dose of ativan for elevated BP.

## 2023-03-28 NOTE — PROGRESS NOTES
[facility] Medicine Services  INPATIENT PROGRESS NOTE    Length of Stay: 6  Date of Admission: 3/22/2023  Primary Care Physician: Brandon Darling MD    Subjective   Chief Complaint: Alcohol withdrawal  HPI: Intubated and sedated    As of today, 03/28/23  Review of Systems   Unable to perform ROS: Intubated        All pertinent negatives and positives are as above. All other systems have been reviewed and are negative unless otherwise stated.     Objective    Temp:  [97.6 °F (36.4 °C)-100.1 °F (37.8 °C)] 97.8 °F (36.6 °C)  Heart Rate:  [59-84] 63  Resp:  [16-21] 16  BP: (117-186)/(57-91) 125/66  FiO2 (%):  [30 %] 30 %    AM-PAC 6 Clicks Score (PT): 6 (03/28/23 0800)    Vent Settings:  FiO2 (%):  [30 %] 30 %  S RR:  [16] 16  PEEP/CPAP (cm H2O):  [5 cm H20] 5 cm H20  MAP (cm H2O):  [8-10] 8    As of today, 03/28/23  Physical Exam  Vitals reviewed.   Constitutional:       Appearance: He is well-developed.      Interventions: He is sedated and intubated.   HENT:      Head: Normocephalic and atraumatic.   Eyes:      Pupils: Pupils are equal, round, and reactive to light.   Cardiovascular:      Rate and Rhythm: Normal rate and regular rhythm.      Heart sounds: Normal heart sounds. No murmur heard.    No friction rub. No gallop.   Pulmonary:      Effort: Pulmonary effort is normal. No respiratory distress. He is intubated.      Breath sounds: Normal breath sounds. No wheezing or rales.   Chest:      Chest wall: No tenderness.   Abdominal:      General: Bowel sounds are normal. There is no distension.      Palpations: Abdomen is soft.      Tenderness: There is no abdominal tenderness.   Musculoskeletal:      Cervical back: Normal range of motion and neck supple.   Neurological:      Comments: Intubated and sedated   Psychiatric:      Comments: Intubated and sedated         Results Review:  I have reviewed the labs, radiology results, and diagnostic  studies.    Laboratory Data:   Results from last 7 days   Lab Units 03/28/23  0512 03/27/23  1218 03/27/23  0356 03/26/23  0440 03/24/23  2221 03/24/23  1006   SODIUM mmol/L 138  --  140 138  --  139   POTASSIUM mmol/L 3.8 4.2 3.6 3.9   < > 3.5   CHLORIDE mmol/L 104  --  108* 105  --  106   CO2 mmol/L 25.0  --  24.0 24.0  --  23.0   BUN mg/dL 5*  --  5* 5*  --  2*   CREATININE mg/dL 0.75*  --  0.66* 0.85  --  0.63*   GLUCOSE mg/dL 104*  --  105* 105*  --  85   CALCIUM mg/dL 8.7  --  8.4* 8.5*  --  8.5*   BILIRUBIN mg/dL  --   --  0.2 0.3  --  0.7   ALK PHOS U/L  --   --  95 106  --  97   ALT (SGPT) U/L  --   --  30 44*  --  55*   AST (SGOT) U/L  --   --  27 44*  --  75*   ANION GAP mmol/L 9.0  --  8.0 9.0  --  10.0    < > = values in this interval not displayed.     Estimated Creatinine Clearance: 177.6 mL/min (A) (by C-G formula based on SCr of 0.75 mg/dL (L)).  Results from last 7 days   Lab Units 03/24/23  0453 03/23/23  2345 03/23/23  0626   MAGNESIUM mg/dL 2.5 2.4 1.9         Results from last 7 days   Lab Units 03/28/23  0512 03/27/23  0356 03/26/23  0440 03/24/23  1006 03/23/23  2345   WBC 10*3/mm3 8.04 10.95* 11.62* 5.90 5.28   HEMOGLOBIN g/dL 11.0* 11.1* 12.0* 12.4* 13.0   HEMATOCRIT % 33.7* 33.0* 36.2* 36.1* 37.5   PLATELETS 10*3/mm3 218 177 189 158 183           Culture Data:   No results found for: BLOODCX  No results found for: URINECX  Respiratory Culture   Date Value Ref Range Status   03/27/2023 Heavy growth (4+) Staphylococcus aureus (A)  Preliminary   03/27/2023 Light growth (2+) Normal Respiratory Sangita  Preliminary     No results found for: WOUNDCX  No results found for: STOOLCX  No components found for: BODYFLD    Radiology Data:   Imaging Results (Last 24 Hours)     Procedure Component Value Units Date/Time    CT Chest Without Contrast Diagnostic [[id number]] Resulted: 03/27/23 1557     Updated: 03/27/23 1558          ABG:  Results from last 7 days   Lab Units 03/27/23  0428   PH, ARTERIAL pH  units 7.431   PO2 ART mm Hg 84.7   PCO2, ARTERIAL mm Hg 39.6   HCO3 ART mmol/L 26.3*       I have reviewed the patient's current medications.     Assessment/Plan     Active Hospital Problems    Diagnosis    • **Alcohol withdrawal (HCC)    • Delirium tremens (HCC)    • Anxiety disorder    • Affective disorder (HCC)    • Transaminitis        Plan:  1.  Alcohol withdrawal: Severe.  Required intubation.  Continue Ativan and phenobarb.  Appreciate psychiatry assistance.  2.  Acute respiratory failure with hypoxemia: Remains on mechanical ventilation.  Will wean as tolerated.  3.  Staph pneumonia: Culture grew Staph aureus.  Will empirically start vancomycin until sensitivities are resulted.  CT scan of the chest pending.  4.  DVT prophylaxis: On Lovenox.        Medical Decision Making  Number and Complexity of problems: Several highly complex medical problems    Conditions and Status:        Condition is unchanged.     Discussed with: Nursing     Treatment Plan  As above    Care Planning  Shared decision making: Patient unable to participate  Code status and discussions: Full code    Disposition  Social Determinants of Health that impact treatment or disposition: Alcohol abuse  I expect the patient to be discharged to rehab in 4-5 days.       I spent 32 minutes providing critical care management to this patient.  This excludes time spent in performing separately billed procedures.          This document has been electronically signed by Jan Saavedra MD on March 28, 2023 12:47 CDT

## 2023-03-28 NOTE — PLAN OF CARE
Problem: Adult Inpatient Plan of Care  Goal: Plan of Care Review  Outcome: Ongoing, Progressing  Goal: Patient-Specific Goal (Individualized)  Outcome: Ongoing, Progressing  Goal: Absence of Hospital-Acquired Illness or Injury  Outcome: Ongoing, Progressing  Goal: Optimal Comfort and Wellbeing  Outcome: Ongoing, Progressing  Goal: Readiness for Transition of Care  Outcome: Ongoing, Progressing     Problem: Fall Injury Risk  Goal: Absence of Fall and Fall-Related Injury  Outcome: Ongoing, Progressing     Problem: Hypertension Comorbidity  Goal: Blood Pressure in Desired Range  Outcome: Ongoing, Progressing     Problem: Alcohol Withdrawal  Goal: Alcohol Withdrawal Symptom Control  Outcome: Ongoing, Progressing     Problem: Acute Neurologic Deterioration (Alcohol Withdrawal)  Goal: Optimal Neurologic Function  Outcome: Ongoing, Progressing     Problem: Substance Misuse (Alcohol Withdrawal)  Goal: Readiness for Change Identified  Outcome: Ongoing, Progressing     Problem: Restraint, Nonviolent  Goal: Absence of Harm or Injury  Outcome: Ongoing, Progressing     Problem: Skin Injury Risk Increased  Goal: Skin Health and Integrity  Outcome: Ongoing, Progressing  Intervention: Optimize Skin Protection  Recent Flowsheet Documentation  Taken 3/28/2023 0711 by Pilar Pearson RRT  Head of Bed (HOB) Positioning: HOB at 30-45 degrees     Problem: Communication Impairment (Mechanical Ventilation, Invasive)  Goal: Effective Communication  Outcome: Ongoing, Progressing  Goal: Effective Communication  Outcome: Ongoing, Progressing     Problem: Device-Related Complication Risk (Mechanical Ventilation, Invasive)  Goal: Optimal Device Function  Outcome: Ongoing, Progressing  Intervention: Optimize Device Care and Function  Recent Flowsheet Documentation  Taken 3/28/2023 0711 by Pilar Pearson RRT  Airway Safety Measures: manual resuscitator/mask at bedside  Goal: Optimal Device Function  Outcome: Ongoing,  Progressing  Intervention: Optimize Device Care and Function  Recent Flowsheet Documentation  Taken 3/28/2023 0711 by Pilar Pearson RRT  Airway Safety Measures: manual resuscitator/mask at bedside     Problem: Inability to Wean (Mechanical Ventilation, Invasive)  Goal: Mechanical Ventilation Liberation  Outcome: Ongoing, Progressing  Goal: Mechanical Ventilation Liberation  Outcome: Ongoing, Progressing     Problem: Nutrition Impairment (Mechanical Ventilation, Invasive)  Goal: Optimal Nutrition Delivery  Outcome: Ongoing, Progressing  Goal: Optimal Nutrition Delivery  Outcome: Ongoing, Progressing     Problem: Skin and Tissue Injury (Mechanical Ventilation, Invasive)  Goal: Absence of Device-Related Skin and Tissue Injury  Outcome: Ongoing, Progressing  Goal: Absence of Device-Related Skin and Tissue Injury  Outcome: Ongoing, Progressing     Problem: Ventilator-Induced Lung Injury (Mechanical Ventilation, Invasive)  Goal: Absence of Ventilator-Induced Lung Injury  Outcome: Ongoing, Progressing  Intervention: Prevent Ventilator-Associated Pneumonia  Recent Flowsheet Documentation  Taken 3/28/2023 0711 by Pilar Pearson RRT  Head of Bed (HOB) Positioning: HOB at 30-45 degrees  Goal: Absence of Ventilator-Induced Lung Injury  Outcome: Ongoing, Progressing  Intervention: Prevent Ventilator-Associated Pneumonia  Recent Flowsheet Documentation  Taken 3/28/2023 0711 by Pilar Pearson RRT  Head of Bed (HOB) Positioning: HOB at 30-45 degrees   Goal Outcome Evaluation:   Pt stable on current vent settings. Will continue to monitor.

## 2023-03-29 LAB
ALBUMIN SERPL-MCNC: 3.2 G/DL (ref 3.5–5.2)
ALBUMIN/GLOB SERPL: 1.3 G/DL
ALP SERPL-CCNC: 97 U/L (ref 39–117)
ALT SERPL W P-5'-P-CCNC: 25 U/L (ref 1–41)
ANION GAP SERPL CALCULATED.3IONS-SCNC: 10 MMOL/L (ref 5–15)
AST SERPL-CCNC: 23 U/L (ref 1–40)
BACTERIA SPEC RESP CULT: ABNORMAL
BACTERIA SPEC RESP CULT: ABNORMAL
BILIRUB SERPL-MCNC: 0.2 MG/DL (ref 0–1.2)
BUN SERPL-MCNC: 8 MG/DL (ref 6–20)
BUN/CREAT SERPL: 12.7 (ref 7–25)
CALCIUM SPEC-SCNC: 8.6 MG/DL (ref 8.6–10.5)
CHLORIDE SERPL-SCNC: 104 MMOL/L (ref 98–107)
CO2 SERPL-SCNC: 25 MMOL/L (ref 22–29)
CREAT SERPL-MCNC: 0.63 MG/DL (ref 0.76–1.27)
DEPRECATED RDW RBC AUTO: 54.7 FL (ref 37–54)
EGFRCR SERPLBLD CKD-EPI 2021: 126.4 ML/MIN/1.73
ERYTHROCYTE [DISTWIDTH] IN BLOOD BY AUTOMATED COUNT: 14.6 % (ref 12.3–15.4)
GLOBULIN UR ELPH-MCNC: 2.5 GM/DL
GLUCOSE BLDC GLUCOMTR-MCNC: 102 MG/DL (ref 70–130)
GLUCOSE BLDC GLUCOMTR-MCNC: 106 MG/DL (ref 70–130)
GLUCOSE BLDC GLUCOMTR-MCNC: 96 MG/DL (ref 70–130)
GLUCOSE SERPL-MCNC: 115 MG/DL (ref 65–99)
GRAM STN SPEC: ABNORMAL
HCT VFR BLD AUTO: 32 % (ref 37.5–51)
HGB BLD-MCNC: 10.8 G/DL (ref 13–17.7)
MCH RBC QN AUTO: 34.2 PG (ref 26.6–33)
MCHC RBC AUTO-ENTMCNC: 33.8 G/DL (ref 31.5–35.7)
MCV RBC AUTO: 101.3 FL (ref 79–97)
PLATELET # BLD AUTO: 267 10*3/MM3 (ref 140–450)
PMV BLD AUTO: 10.4 FL (ref 6–12)
POTASSIUM SERPL-SCNC: 3.8 MMOL/L (ref 3.5–5.2)
PROT SERPL-MCNC: 5.7 G/DL (ref 6–8.5)
RBC # BLD AUTO: 3.16 10*6/MM3 (ref 4.14–5.8)
SODIUM SERPL-SCNC: 139 MMOL/L (ref 136–145)
WBC NRBC COR # BLD: 8.6 10*3/MM3 (ref 3.4–10.8)

## 2023-03-29 PROCEDURE — 25010000002 THIAMINE PER 100 MG: Performed by: PSYCHIATRY & NEUROLOGY

## 2023-03-29 PROCEDURE — 25010000002 ENOXAPARIN PER 10 MG

## 2023-03-29 PROCEDURE — 94760 N-INVAS EAR/PLS OXIMETRY 1: CPT

## 2023-03-29 PROCEDURE — 94799 UNLISTED PULMONARY SVC/PX: CPT

## 2023-03-29 PROCEDURE — 82962 GLUCOSE BLOOD TEST: CPT

## 2023-03-29 PROCEDURE — 80053 COMPREHEN METABOLIC PANEL: CPT | Performed by: HOSPITALIST

## 2023-03-29 PROCEDURE — 85027 COMPLETE CBC AUTOMATED: CPT | Performed by: HOSPITALIST

## 2023-03-29 PROCEDURE — 94003 VENT MGMT INPAT SUBQ DAY: CPT

## 2023-03-29 PROCEDURE — 94761 N-INVAS EAR/PLS OXIMETRY MLT: CPT

## 2023-03-29 PROCEDURE — 25010000002 PHENOBARBITAL PER 120 MG: Performed by: PSYCHIATRY & NEUROLOGY

## 2023-03-29 PROCEDURE — 25010000002 LORAZEPAM PER 2 MG

## 2023-03-29 PROCEDURE — 25010000002 LORAZEPAM PER 2 MG: Performed by: PSYCHIATRY & NEUROLOGY

## 2023-03-29 PROCEDURE — 25010000002 VANCOMYCIN 10 G RECONSTITUTED SOLUTION: Performed by: HOSPITALIST

## 2023-03-29 PROCEDURE — 25010000002 PROPOFOL 10 MG/ML EMULSION

## 2023-03-29 PROCEDURE — 99232 SBSQ HOSP IP/OBS MODERATE 35: CPT | Performed by: PSYCHIATRY & NEUROLOGY

## 2023-03-29 RX ORDER — LORAZEPAM 2 MG/ML
2 INJECTION INTRAMUSCULAR
Status: DISCONTINUED | OUTPATIENT
Start: 2023-03-29 | End: 2023-04-05 | Stop reason: HOSPADM

## 2023-03-29 RX ORDER — LORAZEPAM 2 MG/1
2 TABLET ORAL
Status: DISCONTINUED | OUTPATIENT
Start: 2023-03-29 | End: 2023-04-05 | Stop reason: HOSPADM

## 2023-03-29 RX ORDER — LORAZEPAM 0.5 MG/1
1 TABLET ORAL
Status: DISCONTINUED | OUTPATIENT
Start: 2023-03-29 | End: 2023-04-05 | Stop reason: HOSPADM

## 2023-03-29 RX ORDER — LORAZEPAM 2 MG/ML
1 INJECTION INTRAMUSCULAR
Status: DISCONTINUED | OUTPATIENT
Start: 2023-03-29 | End: 2023-04-05 | Stop reason: HOSPADM

## 2023-03-29 RX ADMIN — PHENOBARBITAL SODIUM 65 MG: 65 INJECTION INTRAMUSCULAR at 17:47

## 2023-03-29 RX ADMIN — LORAZEPAM 5 MG/HR: 2 INJECTION INTRAMUSCULAR; INTRAVENOUS at 11:02

## 2023-03-29 RX ADMIN — PROPOFOL 50 MCG/KG/MIN: 10 INJECTION, EMULSION INTRAVENOUS at 04:16

## 2023-03-29 RX ADMIN — PROPOFOL 50 MCG/KG/MIN: 10 INJECTION, EMULSION INTRAVENOUS at 00:51

## 2023-03-29 RX ADMIN — ATENOLOL 100 MG: 50 TABLET ORAL at 08:03

## 2023-03-29 RX ADMIN — THIAMINE HYDROCHLORIDE 500 MG: 100 INJECTION, SOLUTION INTRAMUSCULAR; INTRAVENOUS at 16:25

## 2023-03-29 RX ADMIN — PANTOPRAZOLE SODIUM 40 MG: 40 INJECTION, POWDER, FOR SOLUTION INTRAVENOUS at 05:10

## 2023-03-29 RX ADMIN — PROPOFOL 50 MCG/KG/MIN: 10 INJECTION, EMULSION INTRAVENOUS at 19:16

## 2023-03-29 RX ADMIN — VANCOMYCIN HYDROCHLORIDE 1750 MG: 10 INJECTION, POWDER, LYOPHILIZED, FOR SOLUTION INTRAVENOUS at 03:20

## 2023-03-29 RX ADMIN — CHLORHEXIDINE GLUCONATE 0.12% ORAL RINSE 15 ML: 1.2 LIQUID ORAL at 20:50

## 2023-03-29 RX ADMIN — CHLORHEXIDINE GLUCONATE 0.12% ORAL RINSE 15 ML: 1.2 LIQUID ORAL at 08:03

## 2023-03-29 RX ADMIN — NICOTINE 1 PATCH: 21 PATCH, EXTENDED RELEASE TRANSDERMAL at 03:37

## 2023-03-29 RX ADMIN — Medication 10 ML: at 20:51

## 2023-03-29 RX ADMIN — THIAMINE HYDROCHLORIDE 500 MG: 100 INJECTION, SOLUTION INTRAMUSCULAR; INTRAVENOUS at 20:50

## 2023-03-29 RX ADMIN — PROPOFOL 50 MCG/KG/MIN: 10 INJECTION, EMULSION INTRAVENOUS at 11:02

## 2023-03-29 RX ADMIN — PROPOFOL 50 MCG/KG/MIN: 10 INJECTION, EMULSION INTRAVENOUS at 15:01

## 2023-03-29 RX ADMIN — DOXYCYCLINE 100 MG: 100 INJECTION, POWDER, LYOPHILIZED, FOR SOLUTION INTRAVENOUS at 23:58

## 2023-03-29 RX ADMIN — DOXYCYCLINE 100 MG: 100 INJECTION, POWDER, LYOPHILIZED, FOR SOLUTION INTRAVENOUS at 15:02

## 2023-03-29 RX ADMIN — THIAMINE HYDROCHLORIDE 500 MG: 100 INJECTION, SOLUTION INTRAMUSCULAR; INTRAVENOUS at 08:02

## 2023-03-29 RX ADMIN — LORAZEPAM 4 MG: 2 INJECTION INTRAMUSCULAR; INTRAVENOUS at 03:18

## 2023-03-29 RX ADMIN — PROPOFOL 50 MCG/KG/MIN: 10 INJECTION, EMULSION INTRAVENOUS at 23:21

## 2023-03-29 RX ADMIN — PHENOBARBITAL SODIUM 65 MG: 65 INJECTION INTRAMUSCULAR at 09:35

## 2023-03-29 RX ADMIN — PHENOBARBITAL SODIUM 65 MG: 65 INJECTION INTRAMUSCULAR at 00:44

## 2023-03-29 RX ADMIN — Medication 10 ML: at 08:03

## 2023-03-29 RX ADMIN — ENOXAPARIN SODIUM 40 MG: 40 INJECTION SUBCUTANEOUS at 08:03

## 2023-03-29 RX ADMIN — PROPOFOL 50 MCG/KG/MIN: 10 INJECTION, EMULSION INTRAVENOUS at 08:02

## 2023-03-29 NOTE — PROGRESS NOTES
Psychiatry Progress Note    3/29/2023    Chief Complaint: delirium tremens    Subjective:  Patient is a 37 y.o. male who was hospitalized for delirium tremens.    Intubated.  No hypertonia.     No family present.     Some difficulties again with lower dose.  Able to taper down to 4.75 mg.       Objective     Vital Signs    Vitals:    03/29/23 1400 03/29/23 1500 03/29/23 1600 03/29/23 1611   BP: 127/70 121/64 140/74    BP Location:   Right arm    Patient Position:   Lying    Pulse: 60 61 65 68   Resp:   20 19   Temp:       TempSrc:       SpO2: 99% 99% 98% 97%   Weight:       Height:           Physical Exam: as of today, 03/29/23   General Appearance: intubated and sedated  Hygiene:   fair  Gait & Station: in bed intubated and sedated  Musculoskeletal: No tremors or abnormal involuntary movements    Mental Status Exam: as of today, 03/29/23   Cooperation:  intubated and sedated  Eye Contact:  Closed  Psychomotor Behavior:  intubated and sedated  Mood: intubated and sedated and unable to answer  Affect:  Unable to obtain given sedation  Speech:  intubated and nonverbal  Thought Process:  intubated and nonverbal  Associations: intubated and nonverbal  Thought Content:     intubated and nonverbal   Suicidal:  None evidenced   Homicidal:  None evidenced   Hallucinations:  Not demonstrated today due to being intubated   Delusion:  Unable to demonstrate  Cognitive Functioning:   Consciousness: intubated and sedated  Reliability:  unable to assess due to being intubated  Insight:  unable to assess due to being intubated  Judgement:  unable to assess due to being intubated  Impulse Control:  unable to assess due to being intubated    Lab Results: Results source: EMR   Lab Results (last 24 hours)     Procedure Component Value Units Date/Time    Blood Culture - Blood, Wrist, Right [[id number]]  (Normal) Collected: 03/27/23 1310    Specimen: Blood from Wrist, Right Updated: 03/29/23 1330     Blood Culture No growth at 2 days     Blood Culture - Blood, Arm, Left [[id number]]  (Normal) Collected: 03/27/23 1311    Specimen: Blood from Arm, Left Updated: 03/29/23 1330     Blood Culture No growth at 2 days    POC Glucose Once [[id number]]  (Normal) Collected: 03/29/23 1112    Specimen: Blood Updated: 03/29/23 1126     Glucose 102 mg/dL      Comment: : [id number] ZAINA AMANDAMeter ID: [id number]       Comprehensive Metabolic Panel [[id number]]  (Abnormal) Collected: 03/29/23 0944    Specimen: Blood Updated: 03/29/23 1020     Glucose 115 mg/dL      BUN 8 mg/dL      Creatinine 0.63 mg/dL      Sodium 139 mmol/L      Potassium 3.8 mmol/L      Chloride 104 mmol/L      CO2 25.0 mmol/L      Calcium 8.6 mg/dL      Total Protein 5.7 g/dL      Albumin 3.2 g/dL      ALT (SGPT) 25 U/L      AST (SGOT) 23 U/L      Alkaline Phosphatase 97 U/L      Total Bilirubin 0.2 mg/dL      Globulin 2.5 gm/dL      A/G Ratio 1.3 g/dL      BUN/Creatinine Ratio 12.7     Anion Gap 10.0 mmol/L      eGFR 126.4 mL/min/1.73     Narrative:      GFR Normal >60  Chronic Kidney Disease <60  Kidney Failure <15      CBC (No Diff) [[id number]]  (Abnormal) Collected: 03/29/23 0944    Specimen: Blood Updated: 03/29/23 0955     WBC 8.60 10*3/mm3      RBC 3.16 10*6/mm3      Hemoglobin 10.8 g/dL      Hematocrit 32.0 %      .3 fL      MCH 34.2 pg      MCHC 33.8 g/dL      RDW 14.6 %      RDW-SD 54.7 fl      MPV 10.4 fL      Platelets 267 10*3/mm3     POC Glucose Once [[id number]]  (Normal) Collected: 03/29/23 0515    Specimen: Blood Updated: 03/29/23 0832     Glucose 96 mg/dL      Comment: : [id number] DOMINGUEZ MADISONMeter ID: [id number]       POC Glucose Once [[id number]]  (Normal) Collected: 03/28/23 2357    Specimen: Blood Updated: 03/29/23 0832     Glucose 106 mg/dL      Comment: : [id number] ANGELICA BosticMeter ID: [id number]       Respiratory Culture - Aspirate, ET Suction [[id number]]  (Abnormal)  (Susceptibility) Collected: 03/27/23 1333    Specimen: Aspirate  from ET Suction Updated: 03/29/23 0808     Respiratory Culture Heavy growth (4+) Staphylococcus aureus      Light growth (2+) Normal Respiratory Derrick     Gram Stain Moderate (3+) WBCs per low power field      Rare (1+) Epithelial cells per low power field      Mixed bacterial derrick    Susceptibility      Staphylococcus aureus      BOONE      Clindamycin Resistant     Inducible Clindamycin Resistance Positive      Oxacillin Susceptible      Tetracycline Susceptible      Trimethoprim + Sulfamethoxazole Susceptible      Vancomycin Susceptible                       Susceptibility Comments     Staphylococcus aureus    This isolate is presumed to be clindamycin resistant based on detection of inducible clindamycin resistance.  Clindamycin may still be effective in some patients.  This isolate is presumed to be clindamycin resistant based on detection of inducible clindamycin resistance.  Clindamycin may still be effective in some patients.                   Radiology Results:  Imaging Results (Last 24 Hours)     ** No results found for the last 24 hours. **          Medicine:   Current Facility-Administered Medications:   •  acetaminophen (TYLENOL) tablet 650 mg, 650 mg, Oral, Q4H PRN, 650 mg at 03/27/23 2348 **OR** [DISCONTINUED] acetaminophen (TYLENOL) 160 MG/5ML solution 650 mg, 650 mg, Oral, Q4H PRN **OR** acetaminophen (TYLENOL) suppository 650 mg, 650 mg, Rectal, Q4H PRN, Harshal Jackson MD  •  atenolol (TENORMIN) tablet 100 mg, 100 mg, Oral, Daily, Harshal Jackson MD, 100 mg at 03/29/23 0803  •  chlorhexidine (PERIDEX) 0.12 % solution 15 mL, 15 mL, Mouth/Throat, Q12H, Bertrand Conteh MD, 15 mL at 03/29/23 0803  •  doxycycline (VIBRAMYCIN) 100 mg/100 mL 0.9% NS MBP, 100 mg, Intravenous, Q12H, Jan Saavedra MD, 100 mg at 03/29/23 1502  •  Enoxaparin Sodium (LOVENOX) syringe 40 mg, 40 mg, Subcutaneous, Daily, Harshal Jackson MD, 40 mg at 03/29/23 0803  •  hydrALAZINE (APRESOLINE) injection 10  mg, 10 mg, Intravenous, Q6H PRN, William Shook MD, 10 mg at 03/27/23 0747  •  lactated ringers infusion, 75 mL/hr, Intravenous, Continuous, Jan Saavedra MD, Last Rate: 75 mL/hr at 03/29/23 0912, 75 mL/hr at 03/29/23 0912  •  LORazepam (ATIVAN) 100 mg in sodium chloride 0.9 % 100 mL infusion, 0.5-10 mg/hr, Intravenous, Titrated, Omkar Hope II, MD, Last Rate: 4.5 mL/hr at 03/29/23 1659, 4.5 mg/hr at 03/29/23 1659  •  LORazepam (ATIVAN) injection 4 mg, 4 mg, Intravenous, Q1H PRN, Tanya Perez MD, 4 mg at 03/29/23 0318  •  Magnesium Sulfate - Total Dose 10 grams - Magnesium 1 or Less, 2 g, Intravenous, PRN **OR** Magnesium Sulfate - Total Dose 6 grams - Magnesium 1.1 - 1.5, 2 g, Intravenous, PRN **OR** Magnesium Sulfate - Total Dose 4 grams - Magnesium 1.6 - 1.9, 4 g, Intravenous, PRN, Rhona Hinds, APRN, Last Rate: 25 mL/hr at 03/23/23 1226, 4 g at 03/23/23 1226  •  morphine injection 2 mg, 2 mg, Intravenous, Q4H PRN **AND** naloxone (NARCAN) injection 0.4 mg, 0.4 mg, Intravenous, Q5 Min PRN, Harshal Jackson MD  •  nicotine (NICODERM CQ) 21 MG/24HR patch 1 patch, 1 patch, Transdermal, Q24H, Harshal Jackson MD, 1 patch at 03/29/23 0337  •  ondansetron (ZOFRAN) tablet 4 mg, 4 mg, Oral, Q6H PRN **OR** ondansetron (ZOFRAN) injection 4 mg, 4 mg, Intravenous, Q6H PRN, Harshal Jackson MD  •  pantoprazole (PROTONIX) injection 40 mg, 40 mg, Intravenous, Q AM, Bertrand Conteh MD, 40 mg at 03/29/23 0510  •  PHENobarbital 65 mg in sodium chloride 0.9 % 100 mL IVPB, 65 mg, Intravenous, Q8H, Omkar Hope II, MD, 65 mg at 03/29/23 1747  •  potassium chloride (MICRO-K) CR capsule 40 mEq, 40 mEq, Oral, PRN, 40 mEq at 03/23/23 1859 **OR** potassium chloride (KLOR-CON) packet 40 mEq, 40 mEq, Oral, PRN, 40 mEq at 03/27/23 0816 **OR** potassium chloride 10 mEq in 100 mL IVPB, 10 mEq, Intravenous, Q1H PRN, Rhona Hinds, APRN  •  prochlorperazine (COMPAZINE) injection 2.5  mg, 2.5 mg, Intravenous, Q6H PRN, Harshal Jackson MD, 2.5 mg at 03/22/23 0317  •  propofol (DIPRIVAN) infusion 10 mg/mL 100 mL, 5-50 mcg/kg/min, Intravenous, Titrated, Harshal Jackson MD, Last Rate: 25.6 mL/hr at 03/29/23 1501, 50 mcg/kg/min at 03/29/23 1501  •  sodium chloride 0.9 % flush 10 mL, 10 mL, Intravenous, Q12H, Harshal Jackson MD, 10 mL at 03/29/23 0803  •  sodium chloride 0.9 % flush 10 mL, 10 mL, Intravenous, PRN, Harshal Jackson MD  •  sodium chloride 0.9 % infusion 40 mL, 40 mL, Intravenous, PRN, Harshal Jackson MD  •  thiamine (B-1) 500 mg in sodium chloride 0.9 % 100 mL IVPB, 500 mg, Intravenous, TID, Omkar Hope II, MD, Last Rate: 200 mL/hr at 03/29/23 1625, 500 mg at 03/29/23 1625        Diagnoses/Assessment:     Alcohol withdrawal (HCC)    Transaminitis    Delirium tremens (HCC)    Anxiety disorder    Affective disorder (HCC)        Treatment Plan:    Alcohol Withdrawal/Delirium Tremens:  --EtOH w/d & DTs are life threatening without treatment              --Cont Phenobarb to 65mg q8hrs               --Cont CIWA and PRN ativan              --Cont to taper Ativan.  Is able to gradually taper: 4.75mg today and consider lowering by 0.25mg              --Cont ativan 4mg q1hrs PRN for agitation and autonomic instability.    --Cont Thiamine 500mg q8h for Wernicke's ppx; EtOH use and w/d are neurotoxic and may affect baseline     Anxiety & Affective Disorder:              --Will consider psychotherapeutic agents after extubation and management of the detox.      HTN:    --Continued atenolol 100mg daily.   --Treat BP elevation with PRN ativan for withdrawal given reasoning above.      Staph PNA: On Doxycycline; concur w. And defer to the primary team.       Omkar Hope II, MD  03/29/23 at 17:57 CDT

## 2023-03-29 NOTE — PROGRESS NOTES
Psychiatry Progress Note    3/28/22      Chief Complaint: delirium tremens    Subjective:  Patient is a 37 y.o. male who was hospitalized for delirium tremens.    Remains intubated.  Does not meaningfully interact.     No family at bedside.     BP increased at times. Difficulty de-escalating dose w/ rebound HTN.           Objective     Vital Signs    Vitals:    03/29/23 1400 03/29/23 1500 03/29/23 1600 03/29/23 1611   BP: 127/70 121/64 140/74    BP Location:   Right arm    Patient Position:   Lying    Pulse: 60 61 65 68   Resp:   20 19   Temp:       TempSrc:       SpO2: 99% 99% 98% 97%   Weight:       Height:           Physical Exam: as of today, 03/29/23   General Appearance: intubated and sedated  Hygiene:   fair  Gait & Station: in bed intubated and sedated  Musculoskeletal: No tremors or abnormal involuntary movements      Mental Status Exam: as of today, 03/29/23   Cooperation:  intubated and sedated  Eye Contact:  Closed  Psychomotor Behavior:  intubated and sedated  Mood: intubated and sedated and unable to answer  Affect:  Unable to obtain given sedation  Speech:  intubated and nonverbal  Thought Process:  intubated and nonverbal  Associations: intubated and nonverbal  Thought Content:     intubated and nonverbal   Suicidal:  None evidenced   Homicidal:  None evidenced   Hallucinations:  Not demonstrated today due to being intubated   Delusion:  Unable to demonstrate  Cognitive Functioning:   Consciousness: intubated and sedated  Reliability:  unable to assess due to being intubated  Insight:  unable to assess due to being intubated  Judgement:  unable to assess due to being intubated  Impulse Control:  unable to assess due to being intubated    Lab Results: Results source: EMR   Lab Results (last 24 hours)     Procedure Component Value Units Date/Time    Blood Culture - Blood, Wrist, Right [[id number]]  (Normal) Collected: 03/27/23 1310    Specimen: Blood from Wrist, Right Updated: 03/29/23 1330     Blood  Culture No growth at 2 days    Blood Culture - Blood, Arm, Left [[id number]]  (Normal) Collected: 03/27/23 1311    Specimen: Blood from Arm, Left Updated: 03/29/23 1330     Blood Culture No growth at 2 days    POC Glucose Once [[id number]]  (Normal) Collected: 03/29/23 1112    Specimen: Blood Updated: 03/29/23 1126     Glucose 102 mg/dL      Comment: : [id number] ZAINA AMANDAMeter ID: [id number]       Comprehensive Metabolic Panel [[id number]]  (Abnormal) Collected: 03/29/23 0944    Specimen: Blood Updated: 03/29/23 1020     Glucose 115 mg/dL      BUN 8 mg/dL      Creatinine 0.63 mg/dL      Sodium 139 mmol/L      Potassium 3.8 mmol/L      Chloride 104 mmol/L      CO2 25.0 mmol/L      Calcium 8.6 mg/dL      Total Protein 5.7 g/dL      Albumin 3.2 g/dL      ALT (SGPT) 25 U/L      AST (SGOT) 23 U/L      Alkaline Phosphatase 97 U/L      Total Bilirubin 0.2 mg/dL      Globulin 2.5 gm/dL      A/G Ratio 1.3 g/dL      BUN/Creatinine Ratio 12.7     Anion Gap 10.0 mmol/L      eGFR 126.4 mL/min/1.73     Narrative:      GFR Normal >60  Chronic Kidney Disease <60  Kidney Failure <15      CBC (No Diff) [[id number]]  (Abnormal) Collected: 03/29/23 0944    Specimen: Blood Updated: 03/29/23 0955     WBC 8.60 10*3/mm3      RBC 3.16 10*6/mm3      Hemoglobin 10.8 g/dL      Hematocrit 32.0 %      .3 fL      MCH 34.2 pg      MCHC 33.8 g/dL      RDW 14.6 %      RDW-SD 54.7 fl      MPV 10.4 fL      Platelets 267 10*3/mm3     POC Glucose Once [[id number]]  (Normal) Collected: 03/29/23 0515    Specimen: Blood Updated: 03/29/23 0832     Glucose 96 mg/dL      Comment: : [id number] DOMINGUEZ MADISONMeter ID: [id number]       POC Glucose Once [[id number]]  (Normal) Collected: 03/28/23 2357    Specimen: Blood Updated: 03/29/23 0832     Glucose 106 mg/dL      Comment: : [id number] ANGELICA SebekaMeter ID: [id number]       Respiratory Culture - Aspirate, ET Suction [[id number]]  (Abnormal)  (Susceptibility) Collected:  03/27/23 1333    Specimen: Aspirate from ET Suction Updated: 03/29/23 0808     Respiratory Culture Heavy growth (4+) Staphylococcus aureus      Light growth (2+) Normal Respiratory Derrick     Gram Stain Moderate (3+) WBCs per low power field      Rare (1+) Epithelial cells per low power field      Mixed bacterial derrick    Susceptibility      Staphylococcus aureus      BOONE      Clindamycin Resistant     Inducible Clindamycin Resistance Positive      Oxacillin Susceptible      Tetracycline Susceptible      Trimethoprim + Sulfamethoxazole Susceptible      Vancomycin Susceptible                       Susceptibility Comments     Staphylococcus aureus    This isolate is presumed to be clindamycin resistant based on detection of inducible clindamycin resistance.  Clindamycin may still be effective in some patients.  This isolate is presumed to be clindamycin resistant based on detection of inducible clindamycin resistance.  Clindamycin may still be effective in some patients.                   Radiology Results:  Imaging Results (Last 24 Hours)     ** No results found for the last 24 hours. **          Medicine:   Current Facility-Administered Medications:   •  acetaminophen (TYLENOL) tablet 650 mg, 650 mg, Oral, Q4H PRN, 650 mg at 03/27/23 2348 **OR** [DISCONTINUED] acetaminophen (TYLENOL) 160 MG/5ML solution 650 mg, 650 mg, Oral, Q4H PRN **OR** acetaminophen (TYLENOL) suppository 650 mg, 650 mg, Rectal, Q4H PRN, Harshal Jackson MD  •  atenolol (TENORMIN) tablet 100 mg, 100 mg, Oral, Daily, Harshal Jackson MD, 100 mg at 03/29/23 0803  •  chlorhexidine (PERIDEX) 0.12 % solution 15 mL, 15 mL, Mouth/Throat, Q12H, Bertrand Conteh MD, 15 mL at 03/29/23 0803  •  doxycycline (VIBRAMYCIN) 100 mg/100 mL 0.9% NS MBP, 100 mg, Intravenous, Q12H, Jan Saavedra MD, 100 mg at 03/29/23 1502  •  Enoxaparin Sodium (LOVENOX) syringe 40 mg, 40 mg, Subcutaneous, Daily, Harshal Jackson MD, 40 mg at 03/29/23 0803  •   hydrALAZINE (APRESOLINE) injection 10 mg, 10 mg, Intravenous, Q6H PRN, William Shook MD, 10 mg at 03/27/23 0747  •  lactated ringers infusion, 75 mL/hr, Intravenous, Continuous, Jan Saavedra MD, Last Rate: 75 mL/hr at 03/29/23 0912, 75 mL/hr at 03/29/23 0912  •  LORazepam (ATIVAN) 100 mg in sodium chloride 0.9 % 100 mL infusion, 0.5-10 mg/hr, Intravenous, Titrated, Omkar Hope II, MD, Last Rate: 4.5 mL/hr at 03/29/23 1659, 4.5 mg/hr at 03/29/23 1659  •  LORazepam (ATIVAN) injection 4 mg, 4 mg, Intravenous, Q1H PRN, Tanya Perez MD, 4 mg at 03/29/23 0318  •  Magnesium Sulfate - Total Dose 10 grams - Magnesium 1 or Less, 2 g, Intravenous, PRN **OR** Magnesium Sulfate - Total Dose 6 grams - Magnesium 1.1 - 1.5, 2 g, Intravenous, PRN **OR** Magnesium Sulfate - Total Dose 4 grams - Magnesium 1.6 - 1.9, 4 g, Intravenous, PRN, Rhona Hinds APRN, Last Rate: 25 mL/hr at 03/23/23 1226, 4 g at 03/23/23 1226  •  morphine injection 2 mg, 2 mg, Intravenous, Q4H PRN **AND** naloxone (NARCAN) injection 0.4 mg, 0.4 mg, Intravenous, Q5 Min PRN, Harshal Jackson MD  •  nicotine (NICODERM CQ) 21 MG/24HR patch 1 patch, 1 patch, Transdermal, Q24H, Harshal Jackson MD, 1 patch at 03/29/23 0337  •  ondansetron (ZOFRAN) tablet 4 mg, 4 mg, Oral, Q6H PRN **OR** ondansetron (ZOFRAN) injection 4 mg, 4 mg, Intravenous, Q6H PRN, Harshal Jackson MD  •  pantoprazole (PROTONIX) injection 40 mg, 40 mg, Intravenous, Q AM, Bertrand Conteh MD, 40 mg at 03/29/23 0510  •  PHENobarbital 65 mg in sodium chloride 0.9 % 100 mL IVPB, 65 mg, Intravenous, Q8H, Omkar Hope II, MD, 65 mg at 03/29/23 1747  •  potassium chloride (MICRO-K) CR capsule 40 mEq, 40 mEq, Oral, PRN, 40 mEq at 03/23/23 1859 **OR** potassium chloride (KLOR-CON) packet 40 mEq, 40 mEq, Oral, PRN, 40 mEq at 03/27/23 0816 **OR** potassium chloride 10 mEq in 100 mL IVPB, 10 mEq, Intravenous, Q1H PRN, Rhona Hinds, APRN  •   prochlorperazine (COMPAZINE) injection 2.5 mg, 2.5 mg, Intravenous, Q6H PRN, Harshal Jackson MD, 2.5 mg at 03/22/23 0317  •  propofol (DIPRIVAN) infusion 10 mg/mL 100 mL, 5-50 mcg/kg/min, Intravenous, Titrated, Harshal Jackson MD, Last Rate: 25.6 mL/hr at 03/29/23 1501, 50 mcg/kg/min at 03/29/23 1501  •  sodium chloride 0.9 % flush 10 mL, 10 mL, Intravenous, Q12H, Harshal Jackson MD, 10 mL at 03/29/23 0803  •  sodium chloride 0.9 % flush 10 mL, 10 mL, Intravenous, PRN, Harshal Jackson MD  •  sodium chloride 0.9 % infusion 40 mL, 40 mL, Intravenous, PRN, Harshal Jackson MD  •  thiamine (B-1) 500 mg in sodium chloride 0.9 % 100 mL IVPB, 500 mg, Intravenous, TID, Omkar Hope II, MD, Last Rate: 200 mL/hr at 03/29/23 1625, 500 mg at 03/29/23 1625        Diagnoses/Assessment:     Alcohol withdrawal (HCC)    Transaminitis    Delirium tremens (HCC)    Anxiety disorder    Affective disorder (HCC)        Treatment Plan:    Alcohol Withdrawal/Delirium Tremens:  --EtOH w/d & DTs are life threatening without treatment              --Cont Phenobarb to 65mg q8hrs               --Cont CIWA and PRN ativan              --Lower Ativan drip to 4.5mg/hr as toelrated.               --Cont ativan 4mg q1hrs PRN for agitation and autonomic instability.    --Cont Thiamine 500mg q8h for Wernicke's ppx; EtOH use and w/d are neurotoxic and may affect baseline     Anxiety & Affective Disorder:              --Will consider psychotherapeutic agents after extubation and management of the detox.      HTN:    --Continued atenolol 100mg daily.  --Treat BP elevation with PRN ativan for withdrawal given reasoning above.           Omkar Hope II, MD  03/29/23 at 17:58 CDT

## 2023-03-29 NOTE — PROGRESS NOTES
[facility] Medicine Services  INPATIENT PROGRESS NOTE    Length of Stay: 7  Date of Admission: 3/22/2023  Primary Care Physician: Brandon Darling MD    Subjective   Chief Complaint: Alcohol withdrawal  HPI: Intubated and sedated    As of today, 03/29/23  Review of Systems   Unable to perform ROS: Intubated        All pertinent negatives and positives are as above. All other systems have been reviewed and are negative unless otherwise stated.     Objective    Temp:  [98.8 °F (37.1 °C)-99.7 °F (37.6 °C)] 98.9 °F (37.2 °C)  Heart Rate:  [63-89] 64  Resp:  [16-24] 17  BP: (111-186)/() 111/56  FiO2 (%):  [30 %] 30 %    AM-PAC 6 Clicks Score (PT): 6 (03/29/23 0800)    Vent Settings:  FiO2 (%):  [30 %] 30 %  S RR:  [16] 16  PEEP/CPAP (cm H2O):  [5 cm H20] 5 cm H20  MAP (cm H2O):  [10-12] 12    As of today, 03/29/23  Physical Exam  Vitals reviewed.   Constitutional:       Appearance: He is well-developed.      Interventions: He is sedated and intubated.   HENT:      Head: Normocephalic and atraumatic.   Eyes:      Pupils: Pupils are equal, round, and reactive to light.   Cardiovascular:      Rate and Rhythm: Normal rate and regular rhythm.      Heart sounds: Normal heart sounds. No murmur heard.    No friction rub. No gallop.   Pulmonary:      Effort: Pulmonary effort is normal. No respiratory distress. He is intubated.      Breath sounds: Normal breath sounds. No wheezing or rales.   Chest:      Chest wall: No tenderness.   Abdominal:      General: Bowel sounds are normal. There is no distension.      Palpations: Abdomen is soft.      Tenderness: There is no abdominal tenderness.   Musculoskeletal:      Cervical back: Normal range of motion and neck supple.   Neurological:      Comments: Intubated and sedated   Psychiatric:      Comments: Intubated and sedated         Results Review:  I have reviewed the labs, radiology results, and diagnostic  studies.    Laboratory Data:   Results from last 7 days   Lab Units 03/29/23  0944 03/28/23  0512 03/27/23  1218 03/27/23  0356 03/26/23  0440   SODIUM mmol/L 139 138  --  140 138   POTASSIUM mmol/L 3.8 3.8 4.2 3.6 3.9   CHLORIDE mmol/L 104 104  --  108* 105   CO2 mmol/L 25.0 25.0  --  24.0 24.0   BUN mg/dL 8 5*  --  5* 5*   CREATININE mg/dL 0.63* 0.75*  --  0.66* 0.85   GLUCOSE mg/dL 115* 104*  --  105* 105*   CALCIUM mg/dL 8.6 8.7  --  8.4* 8.5*   BILIRUBIN mg/dL 0.2  --   --  0.2 0.3   ALK PHOS U/L 97  --   --  95 106   ALT (SGPT) U/L 25  --   --  30 44*   AST (SGOT) U/L 23  --   --  27 44*   ANION GAP mmol/L 10.0 9.0  --  8.0 9.0     Estimated Creatinine Clearance: 192.6 mL/min (A) (by C-G formula based on SCr of 0.63 mg/dL (L)).  Results from last 7 days   Lab Units 03/24/23  0453 03/23/23  2345 03/23/23  0626   MAGNESIUM mg/dL 2.5 2.4 1.9         Results from last 7 days   Lab Units 03/29/23  0944 03/28/23  0512 03/27/23  0356 03/26/23  0440 03/24/23  1006   WBC 10*3/mm3 8.60 8.04 10.95* 11.62* 5.90   HEMOGLOBIN g/dL 10.8* 11.0* 11.1* 12.0* 12.4*   HEMATOCRIT % 32.0* 33.7* 33.0* 36.2* 36.1*   PLATELETS 10*3/mm3 267 218 177 189 158           Culture Data:   Blood Culture   Date Value Ref Range Status   03/27/2023 No growth at 24 hours  Preliminary   03/27/2023 No growth at 24 hours  Preliminary     No results found for: URINECX  Respiratory Culture   Date Value Ref Range Status   03/27/2023 Heavy growth (4+) Staphylococcus aureus (A)  Final   03/27/2023 Light growth (2+) Normal Respiratory Sangita  Final     No results found for: WOUNDCX  No results found for: STOOLCX  No components found for: BODYFLD    Radiology Data:   Imaging Results (Last 24 Hours)     Procedure Component Value Units Date/Time    CT Chest Without Contrast Diagnostic [[id number]] Collected: 03/27/23 1549     Updated: 03/28/23 1316    Narrative:      PROCEDURE: CT CHEST WO CONTRAST DIAGNOSTIC    INDICATION: Pneumonia    COMPARISON: Chest x-ray  2/23/2023     TECHNIQUE:    - reconstructions: Axial, coronal, sagittal    - contrast:  oral:  None                       intravenous: None    This exam was performed according to our departmental  dose-optimization program, which includes automated exposure  control, adjustment of the mA and/or kV according to patient size  and/or use of iterative reconstruction technique.  DLP is 1195.8    FINDINGS:  Endotracheal tube terminates the level of the clavicular heads.  Enteric tube is incompletely included in the study but seen at  least as far distally as the gastroduodenal junction    PULMONARY PARENCHYMA:      -Hepatic groundglass opacities in the upper lobes bilaterally.  There is partial consolidation of the lower lobes dependently,  left greater than right.     MEDIASTINUM / PADMINI:      - heart: Normal size, no pericardial fluid    - aorta/great vessels: Normal caliber and configuration for age    - misc: No mediastinal mass / significant adenopathy     PLEURAL COMPARTMENT:      - misc: Small bilateral pleural effusions.      MISC:      - inferior neck: Negative    - osseous/body wall: Negative    - subdiaphramatic: As visualized, limited, grossly unremarkable      Impression:      1. Partial consolidation of both lower lobes dependently, with  predominantly upper lobe patchy groundglass opacifications. This  likely represents reported pneumonia. Finding should be followed  up to ensure complete radiographic resolution  2. Small bilateral effusions    Electronically signed by:  Mindy Conley MD  3/28/2023 1:14 PM CDT  Workstation: [id number]          ABG:  Results from last 7 days   Lab Units 03/27/23  0428   PH, ARTERIAL pH units 7.431   PO2 ART mm Hg 84.7   PCO2, ARTERIAL mm Hg 39.6   HCO3 ART mmol/L 26.3*       I have reviewed the patient's current medications.     Assessment/Plan     Active Hospital Problems    Diagnosis    • **Alcohol withdrawal (HCC)    • Delirium tremens (HCC)    • Anxiety disorder    •  Affective disorder (HCC)    • Transaminitis        Plan:  1.  Alcohol withdrawal: Severe.  Required intubation.  Continue Ativan and phenobarb.  Appreciate psychiatry assistance.  2.  Acute respiratory failure with hypoxemia: Remains on mechanical ventilation.  Will wean as tolerated.  3.  Staph pneumonia: Culture grew Staph aureus.  Sensitivities shows sensitive to oxacillin.  Will de-escalate from vancomycin to doxycycline.  4.  DVT prophylaxis: On Lovenox.        Medical Decision Making  Number and Complexity of problems: Several highly complex medical problems    Conditions and Status:        Condition is unchanged.     Discussed with: Nursing     Treatment Plan  As above    Care Planning  Shared decision making: Patient unable to participate  Code status and discussions: Full code    Disposition  Social Determinants of Health that impact treatment or disposition: Alcohol abuse  I expect the patient to be discharged to rehab in 4-5 days.       I spent 32 minutes providing critical care management to this patient.  This excludes time spent in performing separately billed procedures.          This document has been electronically signed by Jan Saavedra MD on March 29, 2023 12:14 CDT

## 2023-03-29 NOTE — PLAN OF CARE
Goal Outcome Evaluation:      Pt remains intubated and sedated. Pt on ativan, LR, and propofol gtt. Afebrile. In and out preformed due to retention. Dia placed per MD order. TF running at goal rate. VSS.

## 2023-03-29 NOTE — PLAN OF CARE
Goal Outcome Evaluation:  Plan of Care Reviewed With: spouse        Progress: no change  Outcome Evaluation: Episodes of HTN, PRN Ativan given. Showing increased signs of agitation this shift, vital sign wise, unable to decrease Ativan gtt at this time. Remains on max dose of propofol gtt and intubated. Afebrile this shift. Excessively coughing and having increased secretions from mouth and nose. All needs met at ths time.

## 2023-03-30 LAB
GLUCOSE BLDC GLUCOMTR-MCNC: 103 MG/DL (ref 70–130)
GLUCOSE BLDC GLUCOMTR-MCNC: 115 MG/DL (ref 70–130)
GLUCOSE BLDC GLUCOMTR-MCNC: 87 MG/DL (ref 70–130)

## 2023-03-30 PROCEDURE — 82962 GLUCOSE BLOOD TEST: CPT

## 2023-03-30 PROCEDURE — 99232 SBSQ HOSP IP/OBS MODERATE 35: CPT | Performed by: PSYCHIATRY & NEUROLOGY

## 2023-03-30 PROCEDURE — 94003 VENT MGMT INPAT SUBQ DAY: CPT

## 2023-03-30 PROCEDURE — 25010000002 THIAMINE PER 100 MG: Performed by: PSYCHIATRY & NEUROLOGY

## 2023-03-30 PROCEDURE — 25010000002 PHENOBARBITAL PER 120 MG: Performed by: PSYCHIATRY & NEUROLOGY

## 2023-03-30 PROCEDURE — 94761 N-INVAS EAR/PLS OXIMETRY MLT: CPT

## 2023-03-30 PROCEDURE — 25010000002 MORPHINE PER 10 MG

## 2023-03-30 PROCEDURE — 94799 UNLISTED PULMONARY SVC/PX: CPT

## 2023-03-30 PROCEDURE — 25010000002 ENOXAPARIN PER 10 MG

## 2023-03-30 PROCEDURE — 25010000002 PROPOFOL 10 MG/ML EMULSION

## 2023-03-30 RX ADMIN — DOXYCYCLINE 100 MG: 100 INJECTION, POWDER, LYOPHILIZED, FOR SOLUTION INTRAVENOUS at 14:36

## 2023-03-30 RX ADMIN — PHENOBARBITAL SODIUM 65 MG: 65 INJECTION INTRAMUSCULAR at 07:52

## 2023-03-30 RX ADMIN — THIAMINE HYDROCHLORIDE 500 MG: 100 INJECTION, SOLUTION INTRAMUSCULAR; INTRAVENOUS at 22:00

## 2023-03-30 RX ADMIN — PROPOFOL 50 MCG/KG/MIN: 10 INJECTION, EMULSION INTRAVENOUS at 18:34

## 2023-03-30 RX ADMIN — PROPOFOL 50 MCG/KG/MIN: 10 INJECTION, EMULSION INTRAVENOUS at 11:31

## 2023-03-30 RX ADMIN — LORAZEPAM 4.5 MG/HR: 2 INJECTION INTRAMUSCULAR; INTRAVENOUS at 08:52

## 2023-03-30 RX ADMIN — PROPOFOL 50 MCG/KG/MIN: 10 INJECTION, EMULSION INTRAVENOUS at 21:38

## 2023-03-30 RX ADMIN — ACETAMINOPHEN 650 MG: 325 TABLET ORAL at 04:37

## 2023-03-30 RX ADMIN — PROPOFOL 50 MCG/KG/MIN: 10 INJECTION, EMULSION INTRAVENOUS at 02:29

## 2023-03-30 RX ADMIN — Medication 10 ML: at 21:38

## 2023-03-30 RX ADMIN — PROPOFOL 50 MCG/KG/MIN: 10 INJECTION, EMULSION INTRAVENOUS at 07:51

## 2023-03-30 RX ADMIN — NICOTINE 1 PATCH: 21 PATCH, EXTENDED RELEASE TRANSDERMAL at 03:45

## 2023-03-30 RX ADMIN — PROPOFOL 50 MCG/KG/MIN: 10 INJECTION, EMULSION INTRAVENOUS at 16:00

## 2023-03-30 RX ADMIN — MORPHINE SULFATE 2 MG: 2 INJECTION, SOLUTION INTRAMUSCULAR; INTRAVENOUS at 08:43

## 2023-03-30 RX ADMIN — THIAMINE HYDROCHLORIDE 500 MG: 100 INJECTION, SOLUTION INTRAMUSCULAR; INTRAVENOUS at 10:50

## 2023-03-30 RX ADMIN — Medication 10 ML: at 10:50

## 2023-03-30 RX ADMIN — THIAMINE HYDROCHLORIDE 500 MG: 100 INJECTION, SOLUTION INTRAMUSCULAR; INTRAVENOUS at 16:00

## 2023-03-30 RX ADMIN — ATENOLOL 100 MG: 50 TABLET ORAL at 10:49

## 2023-03-30 RX ADMIN — PHENOBARBITAL SODIUM 65 MG: 65 INJECTION INTRAMUSCULAR at 23:11

## 2023-03-30 RX ADMIN — SODIUM CHLORIDE, POTASSIUM CHLORIDE, SODIUM LACTATE AND CALCIUM CHLORIDE 75 ML/HR: 600; 310; 30; 20 INJECTION, SOLUTION INTRAVENOUS at 17:33

## 2023-03-30 RX ADMIN — PHENOBARBITAL SODIUM 65 MG: 65 INJECTION INTRAMUSCULAR at 17:07

## 2023-03-30 RX ADMIN — ENOXAPARIN SODIUM 40 MG: 40 INJECTION SUBCUTANEOUS at 10:50

## 2023-03-30 RX ADMIN — CHLORHEXIDINE GLUCONATE 0.12% ORAL RINSE 15 ML: 1.2 LIQUID ORAL at 21:38

## 2023-03-30 RX ADMIN — PANTOPRAZOLE SODIUM 40 MG: 40 INJECTION, POWDER, FOR SOLUTION INTRAVENOUS at 05:32

## 2023-03-30 RX ADMIN — CHLORHEXIDINE GLUCONATE 0.12% ORAL RINSE 15 ML: 1.2 LIQUID ORAL at 10:50
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
## 2023-03-30 NOTE — PLAN OF CARE
Goal Outcome Evaluation:      Remains intubated and sedated. VSS afebrile, Remains on Ativan, LR, and Propofol. UOP greater than adaquate

## 2023-03-30 NOTE — PROGRESS NOTES
Adult Nutrition  Assessment    Patient Name:  Jackson Acuña  YOB: 1985  MRN: [id number]  Admit Date:  3/22/2023    Assessment Date:  3/30/2023    Comments:  Pt remains on vent, intubated 3/23. 3/22/23 188#- 3/27 181# and # w/ noted 2+ edema hands. Reported -205# on admit. IV thiamine/phenobarb and CIWA in place- propofol at 25.6 (+676cal). Noted last BM recorded 3/28. Cortrak placed 3/24 -Peptamen AF at goal rate 50ml/hr and free water flush (+600ml) to provide 1440cal (+676cal propofol) = 2116cal, 91g pro and 1572ml (+600ml) = 1572ml/day. RD to follow hospital course.        Electronically signed by:  Verito Clay RD  03/30/23 13:37 CDT

## 2023-03-30 NOTE — PROGRESS NOTES
[facility] Medicine Services  INPATIENT PROGRESS NOTE    Length of Stay: 8  Date of Admission: 3/22/2023  Primary Care Physician: Brandon Darling MD    Subjective   Chief Complaint: Alcohol withdrawal  HPI:    He has delirium tremens currently intubated and sedated.  Current continue on Ativan and propofol drips.    Review of Systems   Unable to perform ROS: Intubated        All pertinent negatives and positives are as above. All other systems have been reviewed and are negative unless otherwise stated.     Objective    Temp:  [98.8 °F (37.1 °C)-101.8 °F (38.8 °C)] 99.8 °F (37.7 °C)  Heart Rate:  [60-97] 63  Resp:  [16-25] 16  BP: (106-156)/(53-79) 114/58  FiO2 (%):  [30 %] 30 %  Physical Exam  Vitals and nursing note reviewed.   Constitutional:       General: He is not in acute distress.     Appearance: He is well-developed. He is not diaphoretic.      Interventions: He is intubated.   HENT:      Head: Normocephalic and atraumatic.   Cardiovascular:      Rate and Rhythm: Normal rate.   Pulmonary:      Effort: Pulmonary effort is normal. No respiratory distress. He is intubated.      Breath sounds: No wheezing.   Abdominal:      General: There is no distension.      Palpations: Abdomen is soft.   Musculoskeletal:         General: Normal range of motion.   Skin:     General: Skin is warm and dry.   Neurological:      Mental Status: He is alert.      Cranial Nerves: No cranial nerve deficit.             Results Review:  I have reviewed the labs, radiology results, and diagnostic studies.    Laboratory Data:   Lab Results (last 24 hours)     Procedure Component Value Units Date/Time    Blood Culture - Blood, Arm, Left [[id number]]  (Normal) Collected: 03/27/23 1311    Specimen: Blood from Arm, Left Updated: 03/30/23 1330     Blood Culture No growth at 3 days    Blood Culture - Blood, Wrist, Right [[id number]]  (Normal) Collected: 03/27/23 1310    Specimen: Blood from Wrist,  Right Updated: 03/30/23 1330     Blood Culture No growth at 3 days    POC Glucose Once [[id number]]  (Normal) Collected: 03/30/23 1128    Specimen: Blood Updated: 03/30/23 1140     Glucose 115 mg/dL      Comment: RN NotifiedOperator: [id number] CHANNING AMANDAMeter ID: [id number]       POC Glucose Once [[id number]]  (Normal) Collected: 03/30/23 0530    Specimen: Blood Updated: 03/30/23 0638     Glucose 103 mg/dL      Comment: : [id number] CHRISTOFER SUSANMeter ID: [id number]       POC Glucose Once [[id number]]  (Normal) Collected: 03/30/23 0015    Specimen: Blood Updated: 03/30/23 0026     Glucose 87 mg/dL      Comment: : [id number] CHRISTIANO AMANDAMeter ID: [id number]             Culture Data:   No results found for: BLOODCX  No results found for: URINECX  No results found for: RESPCX  No results found for: WOUNDCX  No results found for: STOOLCX  No components found for: BODYFLD    Radiology Data:   Imaging Results (Last 24 Hours)     ** No results found for the last 24 hours. **          I have reviewed the patient's current medications.     Assessment/Plan     Active Hospital Problems    Diagnosis    • **Alcohol withdrawal (HCC)    • Delirium tremens (HCC)    • Anxiety disorder    • Affective disorder (HCC)    • Transaminitis      Alcohol withdrawal-severe-currently intubated and continue with Ativan and propofol.  Psychiatry also managing.    Acute respiratory failure with hypoxia-continue with ventilator management.  Continue to wean as tolerated    Staphylococcus pneumonia-continue with IV antibiotics, will monitor    DVT prophylaxis-Lovenox    Medical Decision Making  Number and Complexity of problems: Several highly complex medical problems     Conditions and Status:        Condition is unchanged.     Discussed with: Nursing     Treatment Plan  As above     Care Planning  Shared decision making: Patient unable to participate  Code status and discussions: Full code     Disposition  Social  Determinants of Health that impact treatment or disposition: Alcohol abuse  I expect the patient to be discharged to rehab in 4-5 days.         Critical care time-40 minutes      William Shook MD   03/30/23   13:43 CDT

## 2023-03-31 LAB
ANION GAP SERPL CALCULATED.3IONS-SCNC: 11 MMOL/L (ref 5–15)
BASOPHILS # BLD AUTO: 0.16 10*3/MM3 (ref 0–0.2)
BASOPHILS NFR BLD AUTO: 1.8 % (ref 0–1.5)
BUN SERPL-MCNC: 12 MG/DL (ref 6–20)
BUN/CREAT SERPL: 16.9 (ref 7–25)
CALCIUM SPEC-SCNC: 8.9 MG/DL (ref 8.6–10.5)
CHLORIDE SERPL-SCNC: 106 MMOL/L (ref 98–107)
CO2 SERPL-SCNC: 24 MMOL/L (ref 22–29)
CREAT SERPL-MCNC: 0.71 MG/DL (ref 0.76–1.27)
DEPRECATED RDW RBC AUTO: 54.2 FL (ref 37–54)
EGFRCR SERPLBLD CKD-EPI 2021: 121.9 ML/MIN/1.73
EOSINOPHIL # BLD AUTO: 0.19 10*3/MM3 (ref 0–0.4)
EOSINOPHIL NFR BLD AUTO: 2.2 % (ref 0.3–6.2)
ERYTHROCYTE [DISTWIDTH] IN BLOOD BY AUTOMATED COUNT: 14.5 % (ref 12.3–15.4)
GLUCOSE BLDC GLUCOMTR-MCNC: 106 MG/DL (ref 70–130)
GLUCOSE BLDC GLUCOMTR-MCNC: 114 MG/DL (ref 70–130)
GLUCOSE BLDC GLUCOMTR-MCNC: 121 MG/DL (ref 70–130)
GLUCOSE BLDC GLUCOMTR-MCNC: 92 MG/DL (ref 70–130)
GLUCOSE BLDC GLUCOMTR-MCNC: 98 MG/DL (ref 70–130)
GLUCOSE SERPL-MCNC: 111 MG/DL (ref 65–99)
HCT VFR BLD AUTO: 34.3 % (ref 37.5–51)
HGB BLD-MCNC: 11.2 G/DL (ref 13–17.7)
IMM GRANULOCYTES # BLD AUTO: 0.31 10*3/MM3 (ref 0–0.05)
IMM GRANULOCYTES NFR BLD AUTO: 3.6 % (ref 0–0.5)
LYMPHOCYTES # BLD AUTO: 1.65 10*3/MM3 (ref 0.7–3.1)
LYMPHOCYTES NFR BLD AUTO: 19 % (ref 19.6–45.3)
MAGNESIUM SERPL-MCNC: 2.2 MG/DL (ref 1.6–2.6)
MCH RBC QN AUTO: 33.3 PG (ref 26.6–33)
MCHC RBC AUTO-ENTMCNC: 32.7 G/DL (ref 31.5–35.7)
MCV RBC AUTO: 102.1 FL (ref 79–97)
MONOCYTES # BLD AUTO: 1.17 10*3/MM3 (ref 0.1–0.9)
MONOCYTES NFR BLD AUTO: 13.5 % (ref 5–12)
NEUTROPHILS NFR BLD AUTO: 5.21 10*3/MM3 (ref 1.7–7)
NEUTROPHILS NFR BLD AUTO: 59.9 % (ref 42.7–76)
NRBC BLD AUTO-RTO: 0 /100 WBC (ref 0–0.2)
PLATELET # BLD AUTO: 357 10*3/MM3 (ref 140–450)
PMV BLD AUTO: 10.2 FL (ref 6–12)
POTASSIUM SERPL-SCNC: 3.8 MMOL/L (ref 3.5–5.2)
RBC # BLD AUTO: 3.36 10*6/MM3 (ref 4.14–5.8)
SODIUM SERPL-SCNC: 141 MMOL/L (ref 136–145)
WBC NRBC COR # BLD: 8.69 10*3/MM3 (ref 3.4–10.8)

## 2023-03-31 PROCEDURE — 25010000002 PROPOFOL 10 MG/ML EMULSION

## 2023-03-31 PROCEDURE — 94799 UNLISTED PULMONARY SVC/PX: CPT

## 2023-03-31 PROCEDURE — 94761 N-INVAS EAR/PLS OXIMETRY MLT: CPT

## 2023-03-31 PROCEDURE — 80048 BASIC METABOLIC PNL TOTAL CA: CPT

## 2023-03-31 PROCEDURE — 25010000002 THIAMINE PER 100 MG: Performed by: PSYCHIATRY & NEUROLOGY

## 2023-03-31 PROCEDURE — 94003 VENT MGMT INPAT SUBQ DAY: CPT

## 2023-03-31 PROCEDURE — 82962 GLUCOSE BLOOD TEST: CPT

## 2023-03-31 PROCEDURE — 25010000002 LORAZEPAM PER 2 MG: Performed by: PSYCHIATRY & NEUROLOGY

## 2023-03-31 PROCEDURE — 85025 COMPLETE CBC W/AUTO DIFF WBC: CPT

## 2023-03-31 PROCEDURE — 83735 ASSAY OF MAGNESIUM: CPT

## 2023-03-31 PROCEDURE — 99232 SBSQ HOSP IP/OBS MODERATE 35: CPT | Performed by: PSYCHIATRY & NEUROLOGY

## 2023-03-31 PROCEDURE — 25010000002 ENOXAPARIN PER 10 MG

## 2023-03-31 PROCEDURE — 25010000002 PHENOBARBITAL PER 120 MG: Performed by: PSYCHIATRY & NEUROLOGY

## 2023-03-31 RX ADMIN — THIAMINE HYDROCHLORIDE 500 MG: 100 INJECTION, SOLUTION INTRAMUSCULAR; INTRAVENOUS at 17:00

## 2023-03-31 RX ADMIN — ENOXAPARIN SODIUM 40 MG: 40 INJECTION SUBCUTANEOUS at 08:48

## 2023-03-31 RX ADMIN — PHENOBARBITAL SODIUM 65 MG: 65 INJECTION INTRAMUSCULAR at 08:27

## 2023-03-31 RX ADMIN — PROPOFOL 50 MCG/KG/MIN: 10 INJECTION, EMULSION INTRAVENOUS at 15:31

## 2023-03-31 RX ADMIN — ACETAMINOPHEN 650 MG: 325 TABLET ORAL at 00:38

## 2023-03-31 RX ADMIN — PROPOFOL 50 MCG/KG/MIN: 10 INJECTION, EMULSION INTRAVENOUS at 19:26

## 2023-03-31 RX ADMIN — CHLORHEXIDINE GLUCONATE 0.12% ORAL RINSE 15 ML: 1.2 LIQUID ORAL at 08:48

## 2023-03-31 RX ADMIN — PHENOBARBITAL SODIUM 65 MG: 65 INJECTION INTRAMUSCULAR at 16:09

## 2023-03-31 RX ADMIN — DOXYCYCLINE 100 MG: 100 INJECTION, POWDER, LYOPHILIZED, FOR SOLUTION INTRAVENOUS at 12:14

## 2023-03-31 RX ADMIN — LORAZEPAM 4 MG/HR: 2 INJECTION INTRAMUSCULAR; INTRAVENOUS at 10:23

## 2023-03-31 RX ADMIN — PROPOFOL 50 MCG/KG/MIN: 10 INJECTION, EMULSION INTRAVENOUS at 10:36

## 2023-03-31 RX ADMIN — PROPOFOL 50 MCG/KG/MIN: 10 INJECTION, EMULSION INTRAVENOUS at 22:26

## 2023-03-31 RX ADMIN — CHLORHEXIDINE GLUCONATE 0.12% ORAL RINSE 15 ML: 1.2 LIQUID ORAL at 20:46

## 2023-03-31 RX ADMIN — DOXYCYCLINE 100 MG: 100 INJECTION, POWDER, LYOPHILIZED, FOR SOLUTION INTRAVENOUS at 00:20

## 2023-03-31 RX ADMIN — Medication 10 ML: at 20:46

## 2023-03-31 RX ADMIN — PHENOBARBITAL SODIUM 65 MG: 65 INJECTION INTRAMUSCULAR at 23:09

## 2023-03-31 RX ADMIN — NICOTINE 1 PATCH: 21 PATCH, EXTENDED RELEASE TRANSDERMAL at 04:21

## 2023-03-31 RX ADMIN — Medication 10 ML: at 08:48

## 2023-03-31 RX ADMIN — THIAMINE HYDROCHLORIDE 500 MG: 100 INJECTION, SOLUTION INTRAMUSCULAR; INTRAVENOUS at 20:47

## 2023-03-31 RX ADMIN — PANTOPRAZOLE SODIUM 40 MG: 40 INJECTION, POWDER, FOR SOLUTION INTRAVENOUS at 05:36

## 2023-03-31 RX ADMIN — THIAMINE HYDROCHLORIDE 500 MG: 100 INJECTION, SOLUTION INTRAMUSCULAR; INTRAVENOUS at 08:48

## 2023-03-31 RX ADMIN — PROPOFOL 50 MCG/KG/MIN: 10 INJECTION, EMULSION INTRAVENOUS at 05:35

## 2023-03-31 RX ADMIN — SODIUM CHLORIDE, POTASSIUM CHLORIDE, SODIUM LACTATE AND CALCIUM CHLORIDE 75 ML/HR: 600; 310; 30; 20 INJECTION, SOLUTION INTRAVENOUS at 10:36

## 2023-03-31 RX ADMIN — PROPOFOL 50 MCG/KG/MIN: 10 INJECTION, EMULSION INTRAVENOUS at 02:25

## 2023-03-31 RX ADMIN — ATENOLOL 100 MG: 50 TABLET ORAL at 08:48

## 2023-03-31 NOTE — PLAN OF CARE
Goal Outcome Evaluation:      Pt is on the ventilator. No changes made at this time due to patient going through withdrawls and on ativan. Will continue to wean and monitor.

## 2023-03-31 NOTE — PROGRESS NOTES
Psychiatry Progress Note    3/31/2023    Chief Complaint: delirium tremens    Subjective:  Patient is a 37 y.o. male who was hospitalized for delirium tremens.    Patient is intubated and unable to interact.    Ativan decreased to 4mg/hr this morning at 4am.  Patient has had some sporadic BP elevations over night.  HR has been stable.  Patient is currently at phenobarb at 65mg q8hrs.    Objective     Vital Signs    Vitals:    03/31/23 1600 03/31/23 1611 03/31/23 1630 03/31/23 1700   BP: 134/79  175/99 166/93   BP Location: Right leg      Patient Position: Lying      Pulse: 67 66 74 72   Resp: 16 16     Temp: 98.8 °F (37.1 °C)      TempSrc: Axillary      SpO2: 93% 94% 97% 97%   Weight:       Height:           Physical Exam: as of today, 03/31/23   General Appearance: intubated and sedated,  Hygiene:   fair  Gait & Station: in bed intubated and sedated  Musculoskeletal: No tremors or abnormal involuntary movements    Mental Status Exam: as of today, 03/31/23   Cooperation:  intubated and sedated  Eye Contact:  Closed  Psychomotor Behavior:  intubated and sedated  Mood: intubated and sedated and unable to answer  Affect:  asleep  Speech:  intubated and nonverbal  Thought Process:  intubated and nonverbal  Associations: intubated and nonverbal  Thought Content:     intubated and nonverbal   Suicidal:  None evidenced   Homicidal:  None evidenced   Hallucinations:  Not demonstrated today due to being intubated   Delusion:  Unable to demonstrate  Cognitive Functioning:   Consciousness: intubated and sedated  Reliability:  unable to assess due to being intubated  Insight:  unable to assess due to being intubated  Judgement:  unable to assess due to being intubated  Impulse Control:  unable to assess due to being intubated    Lab Results: Results source: EMR   Lab Results (last 24 hours)     Procedure Component Value Units Date/Time    Blood Culture - Blood, Arm, Left [[id number]]  (Normal) Collected: 03/27/23 1311     Specimen: Blood from Arm, Left Updated: 03/31/23 1330     Blood Culture No growth at 4 days    Blood Culture - Blood, Wrist, Right [[id number]]  (Normal) Collected: 03/27/23 1310    Specimen: Blood from Wrist, Right Updated: 03/31/23 1330     Blood Culture No growth at 4 days    POC Glucose Once [[id number]]  (Normal) Collected: 03/31/23 1211    Specimen: Blood Updated: 03/31/23 1240     Glucose 121 mg/dL      Comment: RN NotifiedOperator: [id number] LOWE REVAMeter ID: [id number]       Basic Metabolic Panel [[id number]]  (Abnormal) Collected: 03/31/23 0734    Specimen: Blood Updated: 03/31/23 0812     Glucose 111 mg/dL      BUN 12 mg/dL      Creatinine 0.71 mg/dL      Sodium 141 mmol/L      Potassium 3.8 mmol/L      Chloride 106 mmol/L      CO2 24.0 mmol/L      Calcium 8.9 mg/dL      BUN/Creatinine Ratio 16.9     Anion Gap 11.0 mmol/L      eGFR 121.9 mL/min/1.73     Narrative:      GFR Normal >60  Chronic Kidney Disease <60  Kidney Failure <15      Magnesium [[id number]]  (Normal) Collected: 03/31/23 0734    Specimen: Blood Updated: 03/31/23 0807     Magnesium 2.2 mg/dL     CBC & Differential [[id number]]  (Abnormal) Collected: 03/31/23 0733    Specimen: Blood Updated: 03/31/23 0750    Narrative:      The following orders were created for panel order CBC & Differential.  Procedure                               Abnormality         Status                     ---------                               -----------         ------                     CBC Auto Differential[[id number]]        Abnormal            Final result                 Please view results for these tests on the individual orders.    CBC Auto Differential [[id number]]  (Abnormal) Collected: 03/31/23 0733    Specimen: Blood Updated: 03/31/23 0750     WBC 8.69 10*3/mm3      RBC 3.36 10*6/mm3      Hemoglobin 11.2 g/dL      Hematocrit 34.3 %      .1 fL      MCH 33.3 pg      MCHC 32.7 g/dL      RDW 14.5 %      RDW-SD 54.2 fl      MPV 10.2 fL      Platelets  357 10*3/mm3      Neutrophil % 59.9 %      Lymphocyte % 19.0 %      Monocyte % 13.5 %      Eosinophil % 2.2 %      Basophil % 1.8 %      Immature Grans % 3.6 %      Neutrophils, Absolute 5.21 10*3/mm3      Lymphocytes, Absolute 1.65 10*3/mm3      Monocytes, Absolute 1.17 10*3/mm3      Eosinophils, Absolute 0.19 10*3/mm3      Basophils, Absolute 0.16 10*3/mm3      Immature Grans, Absolute 0.31 10*3/mm3      nRBC 0.0 /100 WBC     POC Glucose Once [[id number]]  (Normal) Collected: 03/31/23 0641    Specimen: Blood Updated: 03/31/23 0652     Glucose 114 mg/dL      Comment: : [id number] SINGH KATIEMeter ID: [id number]       POC Glucose Once [[id number]]  (Normal) Collected: 03/31/23 0030    Specimen: Blood Updated: 03/31/23 0445     Glucose 92 mg/dL      Comment: : [id number] SINGH KATIEMeter ID: [id number]             Radiology Results:  Imaging Results (Last 24 Hours)     ** No results found for the last 24 hours. **          Medicine:   Current Facility-Administered Medications:   •  acetaminophen (TYLENOL) tablet 650 mg, 650 mg, Oral, Q4H PRN, 650 mg at 03/31/23 0038 **OR** [DISCONTINUED] acetaminophen (TYLENOL) 160 MG/5ML solution 650 mg, 650 mg, Oral, Q4H PRN **OR** acetaminophen (TYLENOL) suppository 650 mg, 650 mg, Rectal, Q4H PRN, Harshal Jackson MD  •  atenolol (TENORMIN) tablet 100 mg, 100 mg, Oral, Daily, Harshal Jackson MD, 100 mg at 03/31/23 0848  •  chlorhexidine (PERIDEX) 0.12 % solution 15 mL, 15 mL, Mouth/Throat, Q12H, Bertrand Conteh MD, 15 mL at 03/31/23 0848  •  doxycycline (VIBRAMYCIN) 100 mg/100 mL 0.9% NS MBP, 100 mg, Intravenous, Q12H, Jan Saavedra MD, 100 mg at 03/31/23 1214  •  Enoxaparin Sodium (LOVENOX) syringe 40 mg, 40 mg, Subcutaneous, Daily, Harshal Jackson MD, 40 mg at 03/31/23 0848  •  hydrALAZINE (APRESOLINE) injection 10 mg, 10 mg, Intravenous, Q6H PRN, William Shook MD, 10 mg at 03/27/23 0747  •  lactated ringers infusion, 75 mL/hr,  Intravenous, Continuous, Jan Saavedra MD, Last Rate: 75 mL/hr at 03/31/23 1036, 75 mL/hr at 03/31/23 1036  •  LORazepam (ATIVAN) 100 mg in sodium chloride 0.9 % 100 mL infusion, 0.5-10 mg/hr, Intravenous, Titrated, Omkar Hope II, MD, Last Rate: 4 mL/hr at 03/31/23 1023, 4 mg/hr at 03/31/23 1023  •  LORazepam (ATIVAN) tablet 1 mg, 1 mg, Oral, Q2H PRN **OR** LORazepam (ATIVAN) injection 1 mg, 1 mg, Intravenous, Q2H PRN **OR** LORazepam (ATIVAN) tablet 2 mg, 2 mg, Oral, Q1H PRN **OR** LORazepam (ATIVAN) injection 2 mg, 2 mg, Intravenous, Q1H PRN **OR** LORazepam (ATIVAN) injection 2 mg, 2 mg, Intravenous, Q15 Min PRN **OR** LORazepam (ATIVAN) injection 2 mg, 2 mg, Intramuscular, Q15 Min PRN, Omkar Hope II, MD  •  LORazepam (ATIVAN) injection 4 mg, 4 mg, Intravenous, Q1H PRN, Tanya Perez MD, 4 mg at 03/29/23 0318  •  Magnesium Sulfate - Total Dose 10 grams - Magnesium 1 or Less, 2 g, Intravenous, PRN **OR** Magnesium Sulfate - Total Dose 6 grams - Magnesium 1.1 - 1.5, 2 g, Intravenous, PRN **OR** Magnesium Sulfate - Total Dose 4 grams - Magnesium 1.6 - 1.9, 4 g, Intravenous, PRN, Rhona Hinds, APRN, Last Rate: 25 mL/hr at 03/23/23 1226, 4 g at 03/23/23 1226  •  morphine injection 2 mg, 2 mg, Intravenous, Q4H PRN, 2 mg at 03/30/23 0843 **AND** naloxone (NARCAN) injection 0.4 mg, 0.4 mg, Intravenous, Q5 Min PRN, Harshal Jackson MD  •  nicotine (NICODERM CQ) 21 MG/24HR patch 1 patch, 1 patch, Transdermal, Q24H, Harshal Jackson MD, 1 patch at 03/31/23 0421  •  ondansetron (ZOFRAN) tablet 4 mg, 4 mg, Oral, Q6H PRN **OR** ondansetron (ZOFRAN) injection 4 mg, 4 mg, Intravenous, Q6H PRN, Harshal Jackson MD  •  pantoprazole (PROTONIX) injection 40 mg, 40 mg, Intravenous, Q AM, Bertrand Conteh MD, 40 mg at 03/31/23 0526  •  PHENobarbital 65 mg in sodium chloride 0.9 % 100 mL IVPB, 65 mg, Intravenous, Q8H, Omkar Hope II, MD, 65 mg at 03/31/23 1609  •   potassium chloride (MICRO-K) CR capsule 40 mEq, 40 mEq, Oral, PRN, 40 mEq at 03/23/23 1859 **OR** potassium chloride (KLOR-CON) packet 40 mEq, 40 mEq, Oral, PRN, 40 mEq at 03/27/23 0816 **OR** potassium chloride 10 mEq in 100 mL IVPB, 10 mEq, Intravenous, Q1H PRN, Rhona Hinds APRN  •  prochlorperazine (COMPAZINE) injection 2.5 mg, 2.5 mg, Intravenous, Q6H PRN, Harshal Jackson MD, 2.5 mg at 03/22/23 0317  •  propofol (DIPRIVAN) infusion 10 mg/mL 100 mL, 5-50 mcg/kg/min, Intravenous, Titrated, Harshal Jackson MD, Last Rate: 25.6 mL/hr at 03/31/23 1531, 50 mcg/kg/min at 03/31/23 1531  •  sodium chloride 0.9 % flush 10 mL, 10 mL, Intravenous, Q12H, Harshal Jackson MD, 10 mL at 03/31/23 0848  •  sodium chloride 0.9 % flush 10 mL, 10 mL, Intravenous, PRN, Harshal Jackson MD  •  sodium chloride 0.9 % infusion 40 mL, 40 mL, Intravenous, PRN, Harshal Jackson MD  •  thiamine (B-1) 500 mg in sodium chloride 0.9 % 100 mL IVPB, 500 mg, Intravenous, TID, Omkar Hope II, MD, Last Rate: 200 mL/hr at 03/31/23 1700, 500 mg at 03/31/23 1700    Diagnoses/Assessment:     Alcohol withdrawal (HCC)    Transaminitis    Delirium tremens (HCC)    Anxiety disorder    Affective disorder (HCC)      Treatment Plan:    Alcohol Withdrawal/Delirium Tremens:              --Cont Phenobart 65mg q8hrs.              --Cont CIIWA and PRN ativan              --Decrease Ativan drip to 4mg/hr.  Continue at this dose over night.              --Cont ativan 4mg q1hrs PRN for agitation and autonomic instability.      Anxiety & Affective Disorder:              --Will consider psychotherapeutic agents after extubation and management of the detox.    HTN:    --Continued atenolol 100mg daily.   --Treat BP elevation with PRN ativan for withdrawal.    Tanya Perez MD  03/31/23 at 17:25 CDT

## 2023-03-31 NOTE — PLAN OF CARE
Problem: Adult Inpatient Plan of Care  Goal: Plan of Care Review  Outcome: Ongoing, Not Progressing  Flowsheets (Taken 3/30/2023 1915)  Progress: improving  Plan of Care Reviewed With: patient  Outcome Evaluation: VSS. Patient remains sedated and on ventilation.   Goal Outcome Evaluation:  Plan of Care Reviewed With: patient        Progress: improving  Outcome Evaluation: VSS. Patient remains sedated and on ventilation.

## 2023-03-31 NOTE — PROGRESS NOTES
[facility] Medicine Services  INPATIENT PROGRESS NOTE    Length of Stay: 9  Date of Admission: 3/22/2023  Primary Care Physician: Brandon Darling MD    Subjective   Chief Complaint: Alcohol withdrawal  HPI:    Patient is intubated and sedated.  Continuing with Ativan and propofol drips.  He is being treated for alcohol withdrawal and delirium tremens.  Has not been tolerating weaning off the drips.    Review of Systems   Unable to perform ROS: Intubated        All pertinent negatives and positives are as above. All other systems have been reviewed and are negative unless otherwise stated.     Objective    Temp:  [98.8 °F (37.1 °C)-100.1 °F (37.8 °C)] 98.8 °F (37.1 °C)  Heart Rate:  [61-81] 63  Resp:  [16-22] 16  BP: (107-166)/(56-95) 124/68  FiO2 (%):  [30 %] 30 %  Physical Exam  Vitals and nursing note reviewed.   Constitutional:       General: He is not in acute distress.     Appearance: He is well-developed. He is ill-appearing. He is not diaphoretic.      Interventions: He is intubated.   HENT:      Head: Normocephalic and atraumatic.   Cardiovascular:      Rate and Rhythm: Normal rate.   Pulmonary:      Effort: Pulmonary effort is normal. No respiratory distress. He is intubated.      Breath sounds: No wheezing.   Abdominal:      General: There is no distension.      Palpations: Abdomen is soft.   Musculoskeletal:         General: Normal range of motion.   Skin:     General: Skin is warm and dry.   Neurological:      Cranial Nerves: No cranial nerve deficit.             Results Review:  I have reviewed the labs, radiology results, and diagnostic studies.    Laboratory Data:   Lab Results (last 24 hours)     Procedure Component Value Units Date/Time    Blood Culture - Blood, Arm, Left [[id number]]  (Normal) Collected: 03/27/23 1311    Specimen: Blood from Arm, Left Updated: 03/31/23 1330     Blood Culture No growth at 4 days    Blood Culture - Blood, Wrist, Right  [[id number]]  (Normal) Collected: 03/27/23 1310    Specimen: Blood from Wrist, Right Updated: 03/31/23 1330     Blood Culture No growth at 4 days    POC Glucose Once [[id number]]  (Normal) Collected: 03/31/23 1211    Specimen: Blood Updated: 03/31/23 1240     Glucose 121 mg/dL      Comment: RN NotifiedOperator: [id number] LOWE REVAMeter ID: [id number]       Basic Metabolic Panel [[id number]]  (Abnormal) Collected: 03/31/23 0734    Specimen: Blood Updated: 03/31/23 0812     Glucose 111 mg/dL      BUN 12 mg/dL      Creatinine 0.71 mg/dL      Sodium 141 mmol/L      Potassium 3.8 mmol/L      Chloride 106 mmol/L      CO2 24.0 mmol/L      Calcium 8.9 mg/dL      BUN/Creatinine Ratio 16.9     Anion Gap 11.0 mmol/L      eGFR 121.9 mL/min/1.73     Narrative:      GFR Normal >60  Chronic Kidney Disease <60  Kidney Failure <15      Magnesium [[id number]]  (Normal) Collected: 03/31/23 0734    Specimen: Blood Updated: 03/31/23 0807     Magnesium 2.2 mg/dL     CBC & Differential [[id number]]  (Abnormal) Collected: 03/31/23 0733    Specimen: Blood Updated: 03/31/23 0750    Narrative:      The following orders were created for panel order CBC & Differential.  Procedure                               Abnormality         Status                     ---------                               -----------         ------                     CBC Auto Differential[[id number]]        Abnormal            Final result                 Please view results for these tests on the individual orders.    CBC Auto Differential [[id number]]  (Abnormal) Collected: 03/31/23 0733    Specimen: Blood Updated: 03/31/23 0750     WBC 8.69 10*3/mm3      RBC 3.36 10*6/mm3      Hemoglobin 11.2 g/dL      Hematocrit 34.3 %      .1 fL      MCH 33.3 pg      MCHC 32.7 g/dL      RDW 14.5 %      RDW-SD 54.2 fl      MPV 10.2 fL      Platelets 357 10*3/mm3      Neutrophil % 59.9 %      Lymphocyte % 19.0 %      Monocyte % 13.5 %      Eosinophil % 2.2 %      Basophil % 1.8 %       Immature Grans % 3.6 %      Neutrophils, Absolute 5.21 10*3/mm3      Lymphocytes, Absolute 1.65 10*3/mm3      Monocytes, Absolute 1.17 10*3/mm3      Eosinophils, Absolute 0.19 10*3/mm3      Basophils, Absolute 0.16 10*3/mm3      Immature Grans, Absolute 0.31 10*3/mm3      nRBC 0.0 /100 WBC     POC Glucose Once [[id number]]  (Normal) Collected: 03/31/23 0641    Specimen: Blood Updated: 03/31/23 0652     Glucose 114 mg/dL      Comment: : [id number] SINGH KATIEMeter ID: [id number]       POC Glucose Once [[id number]]  (Normal) Collected: 03/31/23 0030    Specimen: Blood Updated: 03/31/23 0445     Glucose 92 mg/dL      Comment: : [id number] SINGH KATIEMeter ID: [id number]             Culture Data:   No results found for: BLOODCX  No results found for: URINECX  No results found for: RESPCX  No results found for: WOUNDCX  No results found for: STOOLCX  No components found for: BODYFLD    Radiology Data:   Imaging Results (Last 24 Hours)     ** No results found for the last 24 hours. **          I have reviewed the patient's current medications.     Assessment/Plan     Active Hospital Problems    Diagnosis    • **Alcohol withdrawal (HCC)    • Delirium tremens (HCC)    • Anxiety disorder    • Affective disorder (HCC)    • Transaminitis      Alcohol withdrawal-severe-currently intubated and continue with Ativan and propofol.  Psychiatry also managing.  Has not been tolerating weaning off of Ativan very well.     Acute respiratory failure with hypoxia-continue with ventilator management.  Continue to wean as tolerated  Continue with ventilator management.     Staphylococcus pneumonia-continue with IV antibiotics, will monitor     DVT prophylaxis-Lovenox     Medical Decision Making  Number and Complexity of problems: Several highly complex medical problems     Conditions and Status:        Condition is unchanged.     Discussed with: Nursing     Treatment Plan  As above     Care Planning  Shared decision  making: Patient unable to participate  Code status and discussions: Full code     Disposition  Social Determinants of Health that impact treatment or disposition: Alcohol abuse  I expect the patient to be discharged to rehab in 4-5 days.         Critical care time-38 minutes      William Shook MD   03/31/23   13:57 CDT

## 2023-03-31 NOTE — PLAN OF CARE
Goal Outcome Evaluation:           Progress: improving  Outcome Evaluation: VSS; pt afebrile this shift; pt remains intubated and sedated on vent; ativan gtt maintaind at 4mg/hr; propofol gtt maintained; tolerating tube feeding; no s/s of distress noted; resting inbetween care; all needs met at this time

## 2023-04-01 LAB
ARTERIAL PATENCY WRIST A: ABNORMAL
ATMOSPHERIC PRESS: 738 MMHG
BACTERIA SPEC AEROBE CULT: NORMAL
BACTERIA SPEC AEROBE CULT: NORMAL
BASE EXCESS BLDA CALC-SCNC: 2.9 MMOL/L (ref 0–2)
BDY SITE: ABNORMAL
HCO3 BLDA-SCNC: 26.5 MMOL/L (ref 20–26)
INHALED O2 CONCENTRATION: 30 %
Lab: ABNORMAL
MODALITY: ABNORMAL
PCO2 BLDA: 36.1 MM HG (ref 35–45)
PEEP RESPIRATORY: 5 CM[H2O]
PH BLDA: 7.47 PH UNITS (ref 7.35–7.45)
PO2 BLDA: 65.2 MM HG (ref 83–108)
SAO2 % BLDCOA: 94.1 % (ref 94–99)
SET MECH RESP RATE: 16
VENTILATOR MODE: ABNORMAL
VT ON VENT VENT: 550 ML

## 2023-04-01 PROCEDURE — 25010000002 ENOXAPARIN PER 10 MG

## 2023-04-01 PROCEDURE — 94003 VENT MGMT INPAT SUBQ DAY: CPT

## 2023-04-01 PROCEDURE — 25010000002 LORAZEPAM PER 2 MG: Performed by: PSYCHIATRY & NEUROLOGY

## 2023-04-01 PROCEDURE — 99232 SBSQ HOSP IP/OBS MODERATE 35: CPT | Performed by: PSYCHIATRY & NEUROLOGY

## 2023-04-01 PROCEDURE — 36600 WITHDRAWAL OF ARTERIAL BLOOD: CPT

## 2023-04-01 PROCEDURE — 94799 UNLISTED PULMONARY SVC/PX: CPT

## 2023-04-01 PROCEDURE — 25010000002 PHENOBARBITAL PER 120 MG: Performed by: PSYCHIATRY & NEUROLOGY

## 2023-04-01 PROCEDURE — 82803 BLOOD GASES ANY COMBINATION: CPT

## 2023-04-01 PROCEDURE — 94761 N-INVAS EAR/PLS OXIMETRY MLT: CPT

## 2023-04-01 PROCEDURE — 25010000002 PROPOFOL 10 MG/ML EMULSION

## 2023-04-01 PROCEDURE — 94760 N-INVAS EAR/PLS OXIMETRY 1: CPT

## 2023-04-01 RX ORDER — DOCUSATE SODIUM 50 MG/5 ML
100 LIQUID (ML) ORAL DAILY
Status: DISCONTINUED | OUTPATIENT
Start: 2023-04-01 | End: 2023-04-05 | Stop reason: HOSPADM

## 2023-04-01 RX ADMIN — PHENOBARBITAL SODIUM 65 MG: 65 INJECTION INTRAMUSCULAR at 08:26

## 2023-04-01 RX ADMIN — PROPOFOL 50 MCG/KG/MIN: 10 INJECTION, EMULSION INTRAVENOUS at 02:21

## 2023-04-01 RX ADMIN — Medication 10 ML: at 20:15

## 2023-04-01 RX ADMIN — PROPOFOL 50 MCG/KG/MIN: 10 INJECTION, EMULSION INTRAVENOUS at 06:19

## 2023-04-01 RX ADMIN — PROPOFOL 50 MCG/KG/MIN: 10 INJECTION, EMULSION INTRAVENOUS at 10:20

## 2023-04-01 RX ADMIN — DOCUSATE SODIUM 100 MG: 50 LIQUID ORAL at 12:46

## 2023-04-01 RX ADMIN — DOXYCYCLINE 100 MG: 100 INJECTION, POWDER, LYOPHILIZED, FOR SOLUTION INTRAVENOUS at 00:20

## 2023-04-01 RX ADMIN — NICOTINE 1 PATCH: 21 PATCH, EXTENDED RELEASE TRANSDERMAL at 03:17

## 2023-04-01 RX ADMIN — CHLORHEXIDINE GLUCONATE 0.12% ORAL RINSE 15 ML: 1.2 LIQUID ORAL at 20:15

## 2023-04-01 RX ADMIN — PHENOBARBITAL SODIUM 65 MG: 65 INJECTION INTRAMUSCULAR at 16:10

## 2023-04-01 RX ADMIN — PROPOFOL 50 MCG/KG/MIN: 10 INJECTION, EMULSION INTRAVENOUS at 17:09

## 2023-04-01 RX ADMIN — SODIUM CHLORIDE, POTASSIUM CHLORIDE, SODIUM LACTATE AND CALCIUM CHLORIDE 75 ML/HR: 600; 310; 30; 20 INJECTION, SOLUTION INTRAVENOUS at 17:09

## 2023-04-01 RX ADMIN — PROPOFOL 50 MCG/KG/MIN: 10 INJECTION, EMULSION INTRAVENOUS at 21:40

## 2023-04-01 RX ADMIN — ENOXAPARIN SODIUM 40 MG: 40 INJECTION SUBCUTANEOUS at 10:36

## 2023-04-01 RX ADMIN — LORAZEPAM 4 MG/HR: 2 INJECTION INTRAMUSCULAR; INTRAVENOUS at 15:52

## 2023-04-01 RX ADMIN — PROPOFOL 50 MCG/KG/MIN: 10 INJECTION, EMULSION INTRAVENOUS at 14:07

## 2023-04-01 RX ADMIN — ATENOLOL 100 MG: 50 TABLET ORAL at 10:36

## 2023-04-01 RX ADMIN — DOXYCYCLINE 100 MG: 100 INJECTION, POWDER, LYOPHILIZED, FOR SOLUTION INTRAVENOUS at 12:47

## 2023-04-01 RX ADMIN — SODIUM CHLORIDE, POTASSIUM CHLORIDE, SODIUM LACTATE AND CALCIUM CHLORIDE 75 ML/HR: 600; 310; 30; 20 INJECTION, SOLUTION INTRAVENOUS at 03:03

## 2023-04-01 RX ADMIN — PHENOBARBITAL SODIUM 65 MG: 65 INJECTION INTRAMUSCULAR at 23:06

## 2023-04-01 RX ADMIN — PANTOPRAZOLE SODIUM 40 MG: 40 INJECTION, POWDER, FOR SOLUTION INTRAVENOUS at 06:19

## 2023-04-01 NOTE — PROGRESS NOTES
[facility] ER Medicine Services  INPATIENT PROGRESS NOTE    Length of Stay: 10  Date of Admission: 3/22/2023  Primary Care Physician: Brandon Darling MD    Subjective   Chief Complaint: Alcohol withdrawal  HPI:    Patient has been intubated and sedated continue with propofol and Ativan.  Has not been tolerating weaning off of Ativan very well.  Still gets agitated    Review of Systems   Unable to perform ROS: Intubated          All pertinent negatives and positives are as above. All other systems have been reviewed and are negative unless otherwise stated.     Objective    Temp:  [98.6 °F (37 °C)-98.9 °F (37.2 °C)] 98.9 °F (37.2 °C)  Heart Rate:  [59-83] 66  Resp:  [16-17] 16  BP: ()/(53-99) 106/57  FiO2 (%):  [30 %] 30 %  Physical Exam  Vitals and nursing note reviewed.   Constitutional:       General: He is not in acute distress.     Appearance: He is well-developed. He is not diaphoretic.      Interventions: He is intubated.   HENT:      Head: Normocephalic and atraumatic.   Cardiovascular:      Rate and Rhythm: Normal rate.   Pulmonary:      Effort: Pulmonary effort is normal. No respiratory distress. He is intubated.      Breath sounds: No wheezing.   Abdominal:      General: There is no distension.      Palpations: Abdomen is soft.   Musculoskeletal:         General: Normal range of motion.   Skin:     General: Skin is warm and dry.   Neurological:      Mental Status: He is alert.      Cranial Nerves: No cranial nerve deficit.             Results Review:  I have reviewed the labs, radiology results, and diagnostic studies.    Laboratory Data:   Lab Results (last 24 hours)     Procedure Component Value Units Date/Time    Blood Gas, Arterial - [[id number]]  (Abnormal) Collected: 04/01/23 0411    Specimen: Arterial Blood Updated: 04/01/23 0412     Site Left Brachial     Kush's Test N/A     pH, Arterial 7.474 pH units      Comment: 83 Value above reference range         pCO2, Arterial 36.1 mm Hg      pO2, Arterial 65.2 mm Hg      Comment: 84 Value below reference range        HCO3, Arterial 26.5 mmol/L      Comment: 83 Value above reference range        Base Excess, Arterial 2.9 mmol/L      Comment: 83 Value above reference range        O2 Saturation, Arterial 94.1 %      Barometric Pressure for Blood Gas 738 mmHg      Modality Ventilator     FIO2 30 %      Ventilator Mode NA     Set Tidal Volume 550     Set Mech Resp Rate 16.0     PEEP 5.0     Collected by júnior     Comment: Meter: [id number]     :  [id number]       POC Glucose Once [[id number]]  (Normal) Collected: 03/31/23 2344    Specimen: Blood Updated: 03/31/23 2356     Glucose 106 mg/dL      Comment: : [id number] OPHELIA TODDMeter ID: [id number]       POC Glucose Once [[id number]]  (Normal) Collected: 03/31/23 1837    Specimen: Blood Updated: 03/31/23 1901     Glucose 98 mg/dL      Comment: : [id number] REDD NICKMeter ID: [id number]       Blood Culture - Blood, Arm, Left [[id number]]  (Normal) Collected: 03/27/23 1311    Specimen: Blood from Arm, Left Updated: 03/31/23 1330     Blood Culture No growth at 4 days    Blood Culture - Blood, Wrist, Right [[id number]]  (Normal) Collected: 03/27/23 1310    Specimen: Blood from Wrist, Right Updated: 03/31/23 1330     Blood Culture No growth at 4 days    POC Glucose Once [[id number]]  (Normal) Collected: 03/31/23 1211    Specimen: Blood Updated: 03/31/23 1240     Glucose 121 mg/dL      Comment: RN NotifiedOperator: [id number] LOWE REVAMeter ID: [id number]             Culture Data:   No results found for: BLOODCX  No results found for: URINECX  No results found for: RESPCX  No results found for: WOUNDCX  No results found for: STOOLCX  No components found for: BODYFLD    Radiology Data:   Imaging Results (Last 24 Hours)     ** No results found for the last 24 hours. **          I have reviewed the patient's current medications.     Assessment/Plan      Active Hospital Problems    Diagnosis    • **Alcohol withdrawal    • Delirium tremens    • Anxiety disorder    • Affective disorder    • Transaminitis      Alcohol withdrawal-severe-currently intubated and continue with Ativan and propofol.  Psychiatry also managing.  Has not been tolerating weaning off of Ativan very well.  Ativan continued at 4 mg/h.  We will continue to wean as tolerated.  Psychiatry also managing.     Acute respiratory failure with hypoxia-continue with ventilator management.  Continue to wean as tolerated  Continue with ventilator management.  ABG has been repeated today, has been stable.     Staphylococcus pneumonia-continue with IV antibiotics, will monitor     DVT prophylaxis-Lovenox     Medical Decision Making  Number and Complexity of problems: Several highly complex medical problems     Conditions and Status:        Condition is unchanged.     Discussed with: Nursing     Treatment Plan  As above     Care Planning  Shared decision making: Patient unable to participate  Code status and discussions: Full code     Disposition  Social Determinants of Health that impact treatment or disposition: Alcohol abuse  I expect the patient to be discharged to rehab in 4-5 days.         Critical care time-40 minutes      William Shook MD   04/01/23   10:39 CDT

## 2023-04-01 NOTE — PLAN OF CARE
Goal Outcome Evaluation:  Plan of Care Reviewed With: patient        Progress: improving  Outcome Evaluation: Pt remains intubated, sedated, and on vent. Pt remains on ativan, propofol, and LR gtt.  Pt tolerating tube feed at goal of 50.  UOP adequate. No signs of distress.  Will continue to monitor.

## 2023-04-01 NOTE — PROGRESS NOTES
Psychiatry Progress Note    4/1/2023    Chief Complaint: delirium tremens    Subjective:  Patient is a 37 y.o. male who was hospitalized for delirium tremens.    Patient is intubated and unable to interact.    Ativan at 4mg/hr overnight.  Patient has had stable BP and HR.  Patient is currently at phenobarb at 65mg q8hrs.    Objective     Vital Signs    Vitals:    04/01/23 1322 04/01/23 1400 04/01/23 1500 04/01/23 1607   BP:  137/76 130/72    BP Location:       Patient Position:       Pulse: 74 76 75 66   Resp: 18   16   Temp:       TempSrc:       SpO2: 96% 94% 95% 96%   Weight:       Height:           Physical Exam: as of today, 04/01/23   General Appearance: intubated and sedated,  Hygiene:   fair  Gait & Station: in bed intubated and sedated  Musculoskeletal: No tremors or abnormal involuntary movements    Mental Status Exam: as of today, 04/01/23   Cooperation:  intubated and sedated  Eye Contact:  Closed  Psychomotor Behavior:  intubated and sedated  Mood: intubated and sedated and unable to answer  Affect:  asleep  Speech:  intubated and nonverbal  Thought Process:  intubated and nonverbal  Associations: intubated and nonverbal  Thought Content:     intubated and nonverbal   Suicidal:  None evidenced   Homicidal:  None evidenced   Hallucinations:  Not demonstrated today due to being intubated   Delusion:  Unable to demonstrate  Cognitive Functioning:   Consciousness: intubated and sedated  Reliability:  unable to assess due to being intubated  Insight:  unable to assess due to being intubated  Judgement:  unable to assess due to being intubated  Impulse Control:  unable to assess due to being intubated    Lab Results: Results source: EMR   Lab Results (last 24 hours)     Procedure Component Value Units Date/Time    Blood Culture - Blood, Arm, Left [[id number]]  (Normal) Collected: 03/27/23 1311    Specimen: Blood from Arm, Left Updated: 04/01/23 1330     Blood Culture No growth at 5 days    Blood Culture -  Blood, Wrist, Right [[id number]]  (Normal) Collected: 03/27/23 1310    Specimen: Blood from Wrist, Right Updated: 04/01/23 1330     Blood Culture No growth at 5 days    Blood Gas, Arterial - [[id number]]  (Abnormal) Collected: 04/01/23 0411    Specimen: Arterial Blood Updated: 04/01/23 0412     Site Left Brachial     Kush's Test N/A     pH, Arterial 7.474 pH units      Comment: 83 Value above reference range        pCO2, Arterial 36.1 mm Hg      pO2, Arterial 65.2 mm Hg      Comment: 84 Value below reference range        HCO3, Arterial 26.5 mmol/L      Comment: 83 Value above reference range        Base Excess, Arterial 2.9 mmol/L      Comment: 83 Value above reference range        O2 Saturation, Arterial 94.1 %      Barometric Pressure for Blood Gas 738 mmHg      Modality Ventilator     FIO2 30 %      Ventilator Mode NA     Set Tidal Volume 550     Set Mech Resp Rate 16.0     PEEP 5.0     Collected by júnior     Comment: Meter: [id number]     :  [id number]       POC Glucose Once [[id number]]  (Normal) Collected: 03/31/23 2344    Specimen: Blood Updated: 03/31/23 2356     Glucose 106 mg/dL      Comment: : [id number] OPHELIA TODDMeter ID: [id number]       POC Glucose Once [[id number]]  (Normal) Collected: 03/31/23 1837    Specimen: Blood Updated: 03/31/23 1901     Glucose 98 mg/dL      Comment: : [id number] REDD NICKMeter ID: [id number]             Radiology Results:  Imaging Results (Last 24 Hours)     ** No results found for the last 24 hours. **          Medicine:   Current Facility-Administered Medications:   •  acetaminophen (TYLENOL) tablet 650 mg, 650 mg, Oral, Q4H PRN, 650 mg at 03/31/23 0038 **OR** [DISCONTINUED] acetaminophen (TYLENOL) 160 MG/5ML solution 650 mg, 650 mg, Oral, Q4H PRN **OR** acetaminophen (TYLENOL) suppository 650 mg, 650 mg, Rectal, Q4H PRN, Harshal Jackson MD  •  atenolol (TENORMIN) tablet 100 mg, 100 mg, Oral, Daily, Harshal Jackson MD, 100 mg at  23 1036  •  chlorhexidine (PERIDEX) 0.12 % solution 15 mL, 15 mL, Mouth/Throat, Q12H, Bertrand Conteh MD, 15 mL at 236  •  docusate sodium (COLACE) liquid 100 mg, 100 mg, Oral, Daily, William Shook MD, 100 mg at 23 1246  •  doxycycline (VIBRAMYCIN) 100 mg/100 mL 0.9% NS MBP, 100 mg, Intravenous, Q12H, Jan Saavedra MD, 100 mg at 23 1247  •  Enoxaparin Sodium (LOVENOX) syringe 40 mg, 40 mg, Subcutaneous, Daily, Harshal Jackson MD, 40 mg at 23 1036  •  hydrALAZINE (APRESOLINE) injection 10 mg, 10 mg, Intravenous, Q6H PRN, William Shook MD, 10 mg at 23 0747  •  lactated ringers infusion, 75 mL/hr, Intravenous, Continuous, Jan Saavedra MD, Last Rate: 75 mL/hr at 23 0303, 75 mL/hr at 23 0303  •  LORazepam (ATIVAN) 100 mg in sodium chloride 0.9 % 100 mL infusion, 0.5-10 mg/hr, Intravenous, Titrated, Omkar Hope II, MD, Last Rate: 4 mL/hr at 23 1552, 4 mg/hr at 23 1552  •  LORazepam (ATIVAN) tablet 1 mg, 1 mg, Oral, Q2H PRN **OR** LORazepam (ATIVAN) injection 1 mg, 1 mg, Intravenous, Q2H PRN **OR** LORazepam (ATIVAN) tablet 2 mg, 2 mg, Oral, Q1H PRN **OR** LORazepam (ATIVAN) injection 2 mg, 2 mg, Intravenous, Q1H PRN **OR** LORazepam (ATIVAN) injection 2 mg, 2 mg, Intravenous, Q15 Min PRN **OR** LORazepam (ATIVAN) injection 2 mg, 2 mg, Intramuscular, Q15 Min PRN, Omkar Hope II, MD  •  LORazepam (ATIVAN) injection 4 mg, 4 mg, Intravenous, Q1H PRN, Tanya Perez MD, 4 mg at 23 0318  •  Magnesium Sulfate - Total Dose 10 grams - Magnesium 1 or Less, 2 g, Intravenous, PRN **OR** Magnesium Sulfate - Total Dose 6 grams - Magnesium 1.1 - 1.5, 2 g, Intravenous, PRN **OR** Magnesium Sulfate - Total Dose 4 grams - Magnesium 1.6 - 1.9, 4 g, Intravenous, PRN, Rhona Hinds, APRN, Last Rate: 25 mL/hr at 23 1226, 4 g at 23 1226  •  [] morphine injection 2 mg, 2 mg, Intravenous, Q4H PRN, 2 mg  at 03/30/23 0843 **AND** naloxone (NARCAN) injection 0.4 mg, 0.4 mg, Intravenous, Q5 Min PRN, Harshal Jackson MD  •  nicotine (NICODERM CQ) 21 MG/24HR patch 1 patch, 1 patch, Transdermal, Q24H, Harshal Jackson MD, 1 patch at 04/01/23 0317  •  ondansetron (ZOFRAN) tablet 4 mg, 4 mg, Oral, Q6H PRN **OR** ondansetron (ZOFRAN) injection 4 mg, 4 mg, Intravenous, Q6H PRN, Harshal Jackson MD  •  pantoprazole (PROTONIX) injection 40 mg, 40 mg, Intravenous, Q AM, Bertrand Conteh MD, 40 mg at 04/01/23 0619  •  PHENobarbital 65 mg in sodium chloride 0.9 % 100 mL IVPB, 65 mg, Intravenous, Q8H, Omkar Hope II, MD, 65 mg at 04/01/23 1610  •  potassium chloride (MICRO-K) CR capsule 40 mEq, 40 mEq, Oral, PRN, 40 mEq at 03/23/23 1859 **OR** potassium chloride (KLOR-CON) packet 40 mEq, 40 mEq, Oral, PRN, 40 mEq at 03/27/23 0816 **OR** potassium chloride 10 mEq in 100 mL IVPB, 10 mEq, Intravenous, Q1H PRN, Rhona Hinds, APRN  •  prochlorperazine (COMPAZINE) injection 2.5 mg, 2.5 mg, Intravenous, Q6H PRN, Harshal Jackson MD, 2.5 mg at 03/22/23 0317  •  propofol (DIPRIVAN) infusion 10 mg/mL 100 mL, 5-50 mcg/kg/min, Intravenous, Titrated, Harshal Jackson MD, Last Rate: 25.6 mL/hr at 04/01/23 1407, 50 mcg/kg/min at 04/01/23 1407  •  sodium chloride 0.9 % flush 10 mL, 10 mL, Intravenous, Q12H, Harshal Jackson MD, 10 mL at 03/31/23 2046  •  sodium chloride 0.9 % flush 10 mL, 10 mL, Intravenous, PRN, Harshal Jackson MD  •  sodium chloride 0.9 % infusion 40 mL, 40 mL, Intravenous, PRN, Harshal Jackson MD    Diagnoses/Assessment:     Alcohol withdrawal    Transaminitis    Delirium tremens    Anxiety disorder    Affective disorder      Treatment Plan:    Alcohol Withdrawal/Delirium Tremens:              --Cont Phenobart 65mg q8hrs.              --Cont CIIWA and PRN ativan              --Decrease Ativan drip to 3mg/hr.  Continue at this dose over night.              --Cont  ativan 4mg q1hrs PRN for agitation and autonomic instability.      Anxiety & Affective Disorder:              --Will consider psychotherapeutic agents after extubation and management of the detox.    HTN:    --Continued atenolol 100mg daily.   --Treat BP elevation with PRN ativan for withdrawal.    Tanya Perez MD  04/01/23 at 16:16 CDT

## 2023-04-02 ENCOUNTER — APPOINTMENT (OUTPATIENT)
Dept: GENERAL RADIOLOGY | Facility: HOSPITAL | Age: 38
DRG: 896 | End: 2023-04-02
Payer: COMMERCIAL

## 2023-04-02 LAB
ALBUMIN SERPL-MCNC: 3.5 G/DL (ref 3.5–5.2)
ALBUMIN/GLOB SERPL: 1.2 G/DL
ALP SERPL-CCNC: 96 U/L (ref 39–117)
ALT SERPL W P-5'-P-CCNC: 26 U/L (ref 1–41)
ANION GAP SERPL CALCULATED.3IONS-SCNC: 13 MMOL/L (ref 5–15)
AST SERPL-CCNC: 22 U/L (ref 1–40)
BACTERIA UR QL AUTO: ABNORMAL /HPF
BASOPHILS # BLD AUTO: 0.16 10*3/MM3 (ref 0–0.2)
BASOPHILS NFR BLD AUTO: 1.6 % (ref 0–1.5)
BILIRUB SERPL-MCNC: 0.2 MG/DL (ref 0–1.2)
BILIRUB UR QL STRIP: NEGATIVE
BUN SERPL-MCNC: 12 MG/DL (ref 6–20)
BUN/CREAT SERPL: 17.6 (ref 7–25)
CALCIUM SPEC-SCNC: 9.2 MG/DL (ref 8.6–10.5)
CHLORIDE SERPL-SCNC: 105 MMOL/L (ref 98–107)
CLARITY UR: CLEAR
CO2 SERPL-SCNC: 21 MMOL/L (ref 22–29)
COLOR UR: ABNORMAL
CREAT SERPL-MCNC: 0.68 MG/DL (ref 0.76–1.27)
DEPRECATED RDW RBC AUTO: 53.5 FL (ref 37–54)
EGFRCR SERPLBLD CKD-EPI 2021: 123.5 ML/MIN/1.73
EOSINOPHIL # BLD AUTO: 0.2 10*3/MM3 (ref 0–0.4)
EOSINOPHIL NFR BLD AUTO: 1.9 % (ref 0.3–6.2)
ERYTHROCYTE [DISTWIDTH] IN BLOOD BY AUTOMATED COUNT: 14.6 % (ref 12.3–15.4)
GLOBULIN UR ELPH-MCNC: 3 GM/DL
GLUCOSE BLDC GLUCOMTR-MCNC: 108 MG/DL (ref 70–130)
GLUCOSE SERPL-MCNC: 119 MG/DL (ref 65–99)
GLUCOSE UR STRIP-MCNC: NEGATIVE MG/DL
GRAN CASTS URNS QL MICRO: ABNORMAL /LPF
HCT VFR BLD AUTO: 36 % (ref 37.5–51)
HGB BLD-MCNC: 12.1 G/DL (ref 13–17.7)
HGB UR QL STRIP.AUTO: NEGATIVE
HYALINE CASTS UR QL AUTO: ABNORMAL /LPF
IMM GRANULOCYTES # BLD AUTO: 0.41 10*3/MM3 (ref 0–0.05)
IMM GRANULOCYTES NFR BLD AUTO: 4 % (ref 0–0.5)
KETONES UR QL STRIP: ABNORMAL
LEUKOCYTE ESTERASE UR QL STRIP.AUTO: ABNORMAL
LYMPHOCYTES # BLD AUTO: 1.38 10*3/MM3 (ref 0.7–3.1)
LYMPHOCYTES NFR BLD AUTO: 13.4 % (ref 19.6–45.3)
MAGNESIUM SERPL-MCNC: 2.2 MG/DL (ref 1.6–2.6)
MCH RBC QN AUTO: 33.9 PG (ref 26.6–33)
MCHC RBC AUTO-ENTMCNC: 33.6 G/DL (ref 31.5–35.7)
MCV RBC AUTO: 100.8 FL (ref 79–97)
MONOCYTES # BLD AUTO: 1.01 10*3/MM3 (ref 0.1–0.9)
MONOCYTES NFR BLD AUTO: 9.8 % (ref 5–12)
NEUTROPHILS NFR BLD AUTO: 69.3 % (ref 42.7–76)
NEUTROPHILS NFR BLD AUTO: 7.14 10*3/MM3 (ref 1.7–7)
NITRITE UR QL STRIP: NEGATIVE
NRBC BLD AUTO-RTO: 0 /100 WBC (ref 0–0.2)
PH UR STRIP.AUTO: 5.5 [PH] (ref 5–9)
PLATELET # BLD AUTO: 407 10*3/MM3 (ref 140–450)
PMV BLD AUTO: 9.6 FL (ref 6–12)
POTASSIUM SERPL-SCNC: 3.8 MMOL/L (ref 3.5–5.2)
PROT SERPL-MCNC: 6.5 G/DL (ref 6–8.5)
PROT UR QL STRIP: ABNORMAL
RBC # BLD AUTO: 3.57 10*6/MM3 (ref 4.14–5.8)
RBC # UR STRIP: ABNORMAL /HPF
REF LAB TEST METHOD: ABNORMAL
SODIUM SERPL-SCNC: 139 MMOL/L (ref 136–145)
SP GR UR STRIP: 1.04 (ref 1–1.03)
SQUAMOUS #/AREA URNS HPF: ABNORMAL /HPF
UROBILINOGEN UR QL STRIP: ABNORMAL
WBC # UR STRIP: ABNORMAL /HPF
WBC NRBC COR # BLD: 10.3 10*3/MM3 (ref 3.4–10.8)

## 2023-04-02 PROCEDURE — 81001 URINALYSIS AUTO W/SCOPE: CPT | Performed by: FAMILY MEDICINE

## 2023-04-02 PROCEDURE — 94761 N-INVAS EAR/PLS OXIMETRY MLT: CPT

## 2023-04-02 PROCEDURE — 83735 ASSAY OF MAGNESIUM: CPT | Performed by: FAMILY MEDICINE

## 2023-04-02 PROCEDURE — 25010000002 PHENOBARBITAL PER 120 MG: Performed by: PSYCHIATRY & NEUROLOGY

## 2023-04-02 PROCEDURE — 85025 COMPLETE CBC W/AUTO DIFF WBC: CPT | Performed by: FAMILY MEDICINE

## 2023-04-02 PROCEDURE — 87147 CULTURE TYPE IMMUNOLOGIC: CPT | Performed by: FAMILY MEDICINE

## 2023-04-02 PROCEDURE — 71045 X-RAY EXAM CHEST 1 VIEW: CPT

## 2023-04-02 PROCEDURE — 87040 BLOOD CULTURE FOR BACTERIA: CPT | Performed by: FAMILY MEDICINE

## 2023-04-02 PROCEDURE — 94003 VENT MGMT INPAT SUBQ DAY: CPT

## 2023-04-02 PROCEDURE — 87150 DNA/RNA AMPLIFIED PROBE: CPT | Performed by: FAMILY MEDICINE

## 2023-04-02 PROCEDURE — 94760 N-INVAS EAR/PLS OXIMETRY 1: CPT

## 2023-04-02 PROCEDURE — 94799 UNLISTED PULMONARY SVC/PX: CPT

## 2023-04-02 PROCEDURE — 99232 SBSQ HOSP IP/OBS MODERATE 35: CPT | Performed by: PSYCHIATRY & NEUROLOGY

## 2023-04-02 PROCEDURE — 87070 CULTURE OTHR SPECIMN AEROBIC: CPT | Performed by: FAMILY MEDICINE

## 2023-04-02 PROCEDURE — 80053 COMPREHEN METABOLIC PANEL: CPT | Performed by: FAMILY MEDICINE

## 2023-04-02 PROCEDURE — 82962 GLUCOSE BLOOD TEST: CPT

## 2023-04-02 PROCEDURE — 25010000002 PROPOFOL 10 MG/ML EMULSION

## 2023-04-02 PROCEDURE — 25010000002 LORAZEPAM PER 2 MG: Performed by: PSYCHIATRY & NEUROLOGY

## 2023-04-02 PROCEDURE — 87205 SMEAR GRAM STAIN: CPT | Performed by: FAMILY MEDICINE

## 2023-04-02 PROCEDURE — 25010000002 ENOXAPARIN PER 10 MG

## 2023-04-02 PROCEDURE — 87186 SC STD MICRODIL/AGAR DIL: CPT | Performed by: FAMILY MEDICINE

## 2023-04-02 RX ORDER — AMOXICILLIN 250 MG
2 CAPSULE ORAL 2 TIMES DAILY
Status: DISCONTINUED | OUTPATIENT
Start: 2023-04-02 | End: 2023-04-04

## 2023-04-02 RX ORDER — BISACODYL 5 MG/1
5 TABLET, DELAYED RELEASE ORAL DAILY PRN
Status: DISCONTINUED | OUTPATIENT
Start: 2023-04-02 | End: 2023-04-04

## 2023-04-02 RX ORDER — BISACODYL 10 MG
10 SUPPOSITORY, RECTAL RECTAL DAILY PRN
Status: DISCONTINUED | OUTPATIENT
Start: 2023-04-02 | End: 2023-04-04

## 2023-04-02 RX ORDER — POLYETHYLENE GLYCOL 3350 17 G/17G
17 POWDER, FOR SOLUTION ORAL DAILY PRN
Status: DISCONTINUED | OUTPATIENT
Start: 2023-04-02 | End: 2023-04-04

## 2023-04-02 RX ADMIN — DOCUSATE SODIUM 50 MG AND SENNOSIDES 8.6 MG 2 TABLET: 8.6; 5 TABLET, FILM COATED ORAL at 10:19

## 2023-04-02 RX ADMIN — PHENOBARBITAL SODIUM 65 MG: 65 INJECTION INTRAMUSCULAR at 08:36

## 2023-04-02 RX ADMIN — PROPOFOL 50 MCG/KG/MIN: 10 INJECTION, EMULSION INTRAVENOUS at 05:54

## 2023-04-02 RX ADMIN — ATENOLOL 100 MG: 50 TABLET ORAL at 08:38

## 2023-04-02 RX ADMIN — CHLORHEXIDINE GLUCONATE 0.12% ORAL RINSE 15 ML: 1.2 LIQUID ORAL at 20:22

## 2023-04-02 RX ADMIN — PROPOFOL 50 MCG/KG/MIN: 10 INJECTION, EMULSION INTRAVENOUS at 21:40

## 2023-04-02 RX ADMIN — ENOXAPARIN SODIUM 40 MG: 40 INJECTION SUBCUTANEOUS at 08:38

## 2023-04-02 RX ADMIN — PROPOFOL 50 MCG/KG/MIN: 10 INJECTION, EMULSION INTRAVENOUS at 09:53

## 2023-04-02 RX ADMIN — PHENOBARBITAL SODIUM 65 MG: 65 INJECTION INTRAMUSCULAR at 15:55

## 2023-04-02 RX ADMIN — POLYETHYLENE GLYCOL 3350 17 G: 17 POWDER, FOR SOLUTION ORAL at 10:19

## 2023-04-02 RX ADMIN — PROPOFOL 50 MCG/KG/MIN: 10 INJECTION, EMULSION INTRAVENOUS at 17:31

## 2023-04-02 RX ADMIN — ACETAMINOPHEN 650 MG: 325 TABLET ORAL at 04:10

## 2023-04-02 RX ADMIN — DOXYCYCLINE 100 MG: 100 INJECTION, POWDER, LYOPHILIZED, FOR SOLUTION INTRAVENOUS at 00:05

## 2023-04-02 RX ADMIN — SODIUM CHLORIDE, POTASSIUM CHLORIDE, SODIUM LACTATE AND CALCIUM CHLORIDE 75 ML/HR: 600; 310; 30; 20 INJECTION, SOLUTION INTRAVENOUS at 13:02

## 2023-04-02 RX ADMIN — DOCUSATE SODIUM 50 MG AND SENNOSIDES 8.6 MG 2 TABLET: 8.6; 5 TABLET, FILM COATED ORAL at 20:22

## 2023-04-02 RX ADMIN — DOCUSATE SODIUM 100 MG: 50 LIQUID ORAL at 08:37

## 2023-04-02 RX ADMIN — NICOTINE 1 PATCH: 21 PATCH, EXTENDED RELEASE TRANSDERMAL at 04:03

## 2023-04-02 RX ADMIN — PROPOFOL 50 MCG/KG/MIN: 10 INJECTION, EMULSION INTRAVENOUS at 13:02

## 2023-04-02 RX ADMIN — DOXYCYCLINE 100 MG: 100 INJECTION, POWDER, LYOPHILIZED, FOR SOLUTION INTRAVENOUS at 13:11

## 2023-04-02 RX ADMIN — PANTOPRAZOLE SODIUM 40 MG: 40 INJECTION, POWDER, FOR SOLUTION INTRAVENOUS at 05:51

## 2023-04-02 RX ADMIN — PHENOBARBITAL SODIUM 65 MG: 65 INJECTION INTRAMUSCULAR at 23:06

## 2023-04-02 RX ADMIN — CHLORHEXIDINE GLUCONATE 0.12% ORAL RINSE 15 ML: 1.2 LIQUID ORAL at 13:11

## 2023-04-02 RX ADMIN — LORAZEPAM 4 MG: 2 INJECTION INTRAMUSCULAR; INTRAVENOUS at 17:39

## 2023-04-02 RX ADMIN — PROPOFOL 50 MCG/KG/MIN: 10 INJECTION, EMULSION INTRAVENOUS at 01:36

## 2023-04-02 RX ADMIN — Medication 10 ML: at 20:21

## 2023-04-02 RX ADMIN — ACETAMINOPHEN 650 MG: 325 TABLET ORAL at 20:22

## 2023-04-02 NOTE — PROGRESS NOTES
Psychiatry Progress Note    4/2/2023    Chief Complaint: delirium tremens    Subjective:  Patient is a 37 y.o. male who was hospitalized for delirium tremens.    Patient is intubated and unable to interact.    Ativan at 3mg/hr overnight.  Patient has had stable BP and HR.  Patient is currently at phenobarb at 65mg q8hrs.    Objective     Vital Signs    Vitals:    04/02/23 1400 04/02/23 1500 04/02/23 1600 04/02/23 1623   BP: 101/56 99/59 146/95    BP Location:   Left arm    Patient Position:   Lying    Pulse: 69 64 82 87   Resp:   22 27   Temp:   99.8 °F (37.7 °C)    TempSrc:   Axillary    SpO2: 92% 93% 91% 96%   Weight:       Height:           Physical Exam: as of today, 04/02/23   General Appearance: intubated and sedated,  Hygiene:   fair  Gait & Station: in bed intubated and sedated  Musculoskeletal: No tremors or abnormal involuntary movements    Mental Status Exam: as of today, 04/02/23   Cooperation:  intubated and sedated  Eye Contact:  Closed  Psychomotor Behavior:  intubated and sedated  Mood: intubated and sedated and unable to answer  Affect:  asleep  Speech:  intubated and nonverbal  Thought Process:  intubated and nonverbal  Associations: intubated and nonverbal  Thought Content:     intubated and nonverbal   Suicidal:  None evidenced   Homicidal:  None evidenced   Hallucinations:  Not demonstrated today due to being intubated   Delusion:  Unable to demonstrate  Cognitive Functioning:   Consciousness: intubated and sedated  Reliability:  unable to assess due to being intubated  Insight:  unable to assess due to being intubated  Judgement:  unable to assess due to being intubated  Impulse Control:  unable to assess due to being intubated    Lab Results: Results source: EMR   Lab Results (last 24 hours)     Procedure Component Value Units Date/Time    Respiratory Culture - Sputum, ET Suction [[id number]] Collected: 04/02/23 1047    Specimen: Sputum from ET Suction Updated: 04/02/23 1201     Gram Stain Many  (4+) WBCs per low power field      Rare (1+) Epithelial cells per low power field      Mixed bacterial derrick    Urinalysis, Microscopic Only - Indwelling Urethral Catheter [[id number]]  (Abnormal) Collected: 04/02/23 0952    Specimen: Urine from Indwelling Urethral Catheter Updated: 04/02/23 1037     RBC, UA 0-2 /HPF      WBC, UA 3-5 /HPF      Comment: Urine culture not indicated.        Bacteria, UA Trace /HPF      Squamous Epithelial Cells, UA None Seen /HPF      Hyaline Casts, UA None Seen /LPF      Granular Casts, UA 0-2 /LPF      Methodology Manual Light Microscopy    Urinalysis With Culture If Indicated - Indwelling Urethral Catheter [[id number]]  (Abnormal) Collected: 04/02/23 0952    Specimen: Urine from Indwelling Urethral Catheter Updated: 04/02/23 1014     Color, UA Dark Yellow     Appearance, UA Clear     pH, UA 5.5     Specific [place], UA 1.036     Comment: Result obtained by Refractometer        Glucose, UA Negative     Ketones, UA Trace     Bilirubin, UA Negative     Blood, UA Negative     Protein, UA Trace     Leuk Esterase, UA Trace     Nitrite, UA Negative     Urobilinogen, UA 1.0 E.U./dL    Narrative:      In absence of clinical symptoms, the presence of pyuria, bacteria, and/or nitrites on the urinalysis result does not correlate with infection.    Blood Culture - Blood, Arm, Left [[id number]] Collected: 04/02/23 0920    Specimen: Blood from Arm, Left Updated: 04/02/23 0934    Blood Culture - Blood, Arm, Right [[id number]] Collected: 04/02/23 0919    Specimen: Blood from Arm, Right Updated: 04/02/23 0934    Comprehensive Metabolic Panel [[id number]]  (Abnormal) Collected: 04/02/23 0525    Specimen: Blood Updated: 04/02/23 0551     Glucose 119 mg/dL      BUN 12 mg/dL      Creatinine 0.68 mg/dL      Sodium 139 mmol/L      Potassium 3.8 mmol/L      Chloride 105 mmol/L      CO2 21.0 mmol/L      Calcium 9.2 mg/dL      Total Protein 6.5 g/dL      Albumin 3.5 g/dL      ALT (SGPT) 26 U/L      AST (SGOT) 22  U/L      Alkaline Phosphatase 96 U/L      Total Bilirubin 0.2 mg/dL      Globulin 3.0 gm/dL      A/G Ratio 1.2 g/dL      BUN/Creatinine Ratio 17.6     Anion Gap 13.0 mmol/L      eGFR 123.5 mL/min/1.73     Narrative:      GFR Normal >60  Chronic Kidney Disease <60  Kidney Failure <15      Magnesium [[id number]]  (Normal) Collected: 04/02/23 0525    Specimen: Blood Updated: 04/02/23 0551     Magnesium 2.2 mg/dL     CBC & Differential [[id number]]  (Abnormal) Collected: 04/02/23 0525    Specimen: Blood Updated: 04/02/23 0532    Narrative:      The following orders were created for panel order CBC & Differential.  Procedure                               Abnormality         Status                     ---------                               -----------         ------                     CBC Auto Differential[[id number]]        Abnormal            Final result                 Please view results for these tests on the individual orders.    CBC Auto Differential [[id number]]  (Abnormal) Collected: 04/02/23 0525    Specimen: Blood Updated: 04/02/23 0532     WBC 10.30 10*3/mm3      RBC 3.57 10*6/mm3      Hemoglobin 12.1 g/dL      Hematocrit 36.0 %      .8 fL      MCH 33.9 pg      MCHC 33.6 g/dL      RDW 14.6 %      RDW-SD 53.5 fl      MPV 9.6 fL      Platelets 407 10*3/mm3      Neutrophil % 69.3 %      Lymphocyte % 13.4 %      Monocyte % 9.8 %      Eosinophil % 1.9 %      Basophil % 1.6 %      Immature Grans % 4.0 %      Neutrophils, Absolute 7.14 10*3/mm3      Lymphocytes, Absolute 1.38 10*3/mm3      Monocytes, Absolute 1.01 10*3/mm3      Eosinophils, Absolute 0.20 10*3/mm3      Basophils, Absolute 0.16 10*3/mm3      Immature Grans, Absolute 0.41 10*3/mm3      nRBC 0.0 /100 WBC     POC Glucose Once [[id number]]  (Normal) Collected: 04/02/23 0032    Specimen: Blood Updated: 04/02/23 0507     Glucose 108 mg/dL      Comment: : [id number] OPHELIA TODDMeter ID: [id number]             Radiology Results:  Imaging Results  (Last 24 Hours)     Procedure Component Value Units Date/Time    XR Chest 1 View [[id number]] Collected: 04/02/23 0912     Updated: 04/02/23 0935    Narrative:      EXAM: Chest, 1 AP view  CLINICAL INDICATION: Intubated, fever    COMPARISON: Chest radiographs 3/23/2023    FINDINGS:    There is Dobbhoff tube coursing through the esophagus.  There is endotracheal tube 4.9 cm above levi.    There are stable mild perihilar and basilar interstitial changes,  as well as possible slight blunting of left costophrenic angle.    There is no pneumothorax or interval cardiomegaly.    There is no acute or aggressive osseous lesion.      Impression:      CONCLUSION:     1. Stable appearing endotracheal tube position.    2. Stable mild bilateral interstitial changes and possible small  left-sided pleural effusion. See report for other details.    Electronically signed by:  Kyrie Crabtree DO  4/2/2023 9:33 AM CDT  Workstation: [id number]          Medicine:   Current Facility-Administered Medications:   •  acetaminophen (TYLENOL) tablet 650 mg, 650 mg, Oral, Q4H PRN, 650 mg at 04/02/23 0410 **OR** [DISCONTINUED] acetaminophen (TYLENOL) 160 MG/5ML solution 650 mg, 650 mg, Oral, Q4H PRN **OR** acetaminophen (TYLENOL) suppository 650 mg, 650 mg, Rectal, Q4H PRN, Harshal Jackson MD  •  atenolol (TENORMIN) tablet 100 mg, 100 mg, Oral, Daily, Harshal Jackson MD, 100 mg at 04/02/23 0838  •  sennosides-docusate (PERICOLACE) 8.6-50 MG per tablet 2 tablet, 2 tablet, Oral, BID, 2 tablet at 04/02/23 1019 **AND** polyethylene glycol (MIRALAX) packet 17 g, 17 g, Oral, Daily PRN, 17 g at 04/02/23 1019 **AND** bisacodyl (DULCOLAX) EC tablet 5 mg, 5 mg, Oral, Daily PRN **AND** bisacodyl (DULCOLAX) suppository 10 mg, 10 mg, Rectal, Daily PRN, William Shook MD  •  chlorhexidine (PERIDEX) 0.12 % solution 15 mL, 15 mL, Mouth/Throat, Q12H, Bertrand Conteh MD, 15 mL at 04/02/23 1311  •  docusate sodium (COLACE) liquid 100 mg, 100 mg,  Oral, Daily, William Shook MD, 100 mg at 23 0837  •  doxycycline (VIBRAMYCIN) 100 mg/100 mL 0.9% NS MBP, 100 mg, Intravenous, Q12H, Jan Saavedra MD, 100 mg at 23 1311  •  Enoxaparin Sodium (LOVENOX) syringe 40 mg, 40 mg, Subcutaneous, Daily, Harshal Jackson MD, 40 mg at 23 0838  •  hydrALAZINE (APRESOLINE) injection 10 mg, 10 mg, Intravenous, Q6H PRN, William Shook MD, 10 mg at 23 0747  •  lactated ringers infusion, 75 mL/hr, Intravenous, Continuous, Jan Saavedra MD, Last Rate: 75 mL/hr at 23 1302, 75 mL/hr at 23 1302  •  LORazepam (ATIVAN) 100 mg in sodium chloride 0.9 % 100 mL infusion, 0.5-10 mg/hr, Intravenous, Titrated, Omkar Hope II, MD, Last Rate: 3 mL/hr at 23 1709, 3 mg/hr at 23 1709  •  LORazepam (ATIVAN) tablet 1 mg, 1 mg, Oral, Q2H PRN **OR** LORazepam (ATIVAN) injection 1 mg, 1 mg, Intravenous, Q2H PRN **OR** LORazepam (ATIVAN) tablet 2 mg, 2 mg, Oral, Q1H PRN **OR** LORazepam (ATIVAN) injection 2 mg, 2 mg, Intravenous, Q1H PRN **OR** LORazepam (ATIVAN) injection 2 mg, 2 mg, Intravenous, Q15 Min PRN **OR** LORazepam (ATIVAN) injection 2 mg, 2 mg, Intramuscular, Q15 Min PRN, Omkar Hope II, MD  •  LORazepam (ATIVAN) injection 4 mg, 4 mg, Intravenous, Q1H PRN, Tanya Perez MD, 4 mg at 23 0318  •  Magnesium Sulfate - Total Dose 10 grams - Magnesium 1 or Less, 2 g, Intravenous, PRN **OR** Magnesium Sulfate - Total Dose 6 grams - Magnesium 1.1 - 1.5, 2 g, Intravenous, PRN **OR** Magnesium Sulfate - Total Dose 4 grams - Magnesium 1.6 - 1.9, 4 g, Intravenous, PRN, Rhona Hinds, APRN, Last Rate: 25 mL/hr at 23 1226, 4 g at 23 1226  •  [] morphine injection 2 mg, 2 mg, Intravenous, Q4H PRN, 2 mg at 23 0843 **AND** naloxone (NARCAN) injection 0.4 mg, 0.4 mg, Intravenous, Q5 Min PRN, Harshal Jackson MD  •  nicotine (NICODERM CQ) 21 MG/24HR patch 1 patch, 1 patch,  Transdermal, Q24H, Harshal Jackson MD, 1 patch at 04/02/23 0403  •  ondansetron (ZOFRAN) tablet 4 mg, 4 mg, Oral, Q6H PRN **OR** ondansetron (ZOFRAN) injection 4 mg, 4 mg, Intravenous, Q6H PRN, Harshal Jackson MD  •  pantoprazole (PROTONIX) injection 40 mg, 40 mg, Intravenous, Q AM, Bertrand Conteh MD, 40 mg at 04/02/23 0551  •  PHENobarbital 65 mg in sodium chloride 0.9 % 100 mL IVPB, 65 mg, Intravenous, Q8H, Omkar Hope II, MD, 65 mg at 04/02/23 1555  •  potassium chloride (MICRO-K) CR capsule 40 mEq, 40 mEq, Oral, PRN, 40 mEq at 03/23/23 1859 **OR** potassium chloride (KLOR-CON) packet 40 mEq, 40 mEq, Oral, PRN, 40 mEq at 03/27/23 0816 **OR** potassium chloride 10 mEq in 100 mL IVPB, 10 mEq, Intravenous, Q1H PRN, Rhona Hinds APRN  •  prochlorperazine (COMPAZINE) injection 2.5 mg, 2.5 mg, Intravenous, Q6H PRN, Harshal Jackson MD, 2.5 mg at 03/22/23 0317  •  propofol (DIPRIVAN) infusion 10 mg/mL 100 mL, 5-50 mcg/kg/min, Intravenous, Titrated, Harshal Jackson MD, Last Rate: 25.6 mL/hr at 04/02/23 1302, 50 mcg/kg/min at 04/02/23 1302  •  sodium chloride 0.9 % flush 10 mL, 10 mL, Intravenous, Q12H, Harshal Jackson MD, 10 mL at 04/01/23 2015  •  sodium chloride 0.9 % flush 10 mL, 10 mL, Intravenous, PRN, Harshal Jackson MD  •  sodium chloride 0.9 % infusion 40 mL, 40 mL, Intravenous, PRN, Harshal Jackson MD    Diagnoses/Assessment:     Alcohol withdrawal    Transaminitis    Delirium tremens    Anxiety disorder    Affective disorder      Treatment Plan:    Alcohol Withdrawal/Delirium Tremens:              --Cont Phenobart 65mg q8hrs.  Plan to taper once ativan dose down to 1mg/hr.              --Cont CIIWA and PRN ativan              --Decrease Ativan drip to 2mg/hr.  Continue at this dose over night.              --Cont ativan 4mg q1hrs PRN for agitation and autonomic instability.      Anxiety & Affective Disorder:              --Will consider  psychotherapeutic agents after extubation and management of the detox.    HTN:    --Continued atenolol 100mg daily.   --Treat BP elevation with PRN ativan for withdrawal.    Tanya Perez MD  04/02/23 at 17:08 CDT

## 2023-04-02 NOTE — PLAN OF CARE
Patient remains on mechanical ventilation at this time. Vent settings remain unchanged. Patient tolerating ventilator. Pt does not currently qualify for SBT attempt as he remains on ativan drip.

## 2023-04-02 NOTE — PROGRESS NOTES
[facility] Medicine Services  INPATIENT PROGRESS NOTE    Length of Stay: 11  Date of Admission: 3/22/2023  Primary Care Physician: Brandon Darling MD    Subjective   Chief Complaint: Alcohol withdrawal  HPI:    He is continuing Ativan drip for withdrawal.  Ativan drip has been slowed down to 3 mg/h.  He has been running some low-grade fevers as well.    Review of Systems   Unable to perform ROS: Intubated          All pertinent negatives and positives are as above. All other systems have been reviewed and are negative unless otherwise stated.     Objective    Temp:  [98.4 °F (36.9 °C)-100.8 °F (38.2 °C)] 99 °F (37.2 °C)  Heart Rate:  [63-84] 75  Resp:  [16-26] 26  BP: ()/(53-93) 118/77  FiO2 (%):  [30 %] 30 %  Physical Exam  Vitals and nursing note reviewed.   Constitutional:       General: He is not in acute distress.     Appearance: He is well-developed. He is not diaphoretic.      Interventions: He is intubated.   HENT:      Head: Normocephalic and atraumatic.   Cardiovascular:      Rate and Rhythm: Normal rate.   Pulmonary:      Effort: Pulmonary effort is normal. No respiratory distress. He is intubated.      Breath sounds: No wheezing.   Abdominal:      General: There is no distension.      Palpations: Abdomen is soft.   Musculoskeletal:         General: Normal range of motion.   Skin:     General: Skin is warm and dry.   Neurological:      Cranial Nerves: No cranial nerve deficit.             Results Review:  I have reviewed the labs, radiology results, and diagnostic studies.    Laboratory Data:   Lab Results (last 24 hours)     Procedure Component Value Units Date/Time    Respiratory Culture - Sputum, ET Suction [[id number]] Collected: 04/02/23 1047    Specimen: Sputum from ET Suction Updated: 04/02/23 1201     Gram Stain Many (4+) WBCs per low power field      Rare (1+) Epithelial cells per low power field      Mixed bacterial derrick    Urinalysis,  Microscopic Only - Indwelling Urethral Catheter [[id number]]  (Abnormal) Collected: 04/02/23 0952    Specimen: Urine from Indwelling Urethral Catheter Updated: 04/02/23 1037     RBC, UA 0-2 /HPF      WBC, UA 3-5 /HPF      Comment: Urine culture not indicated.        Bacteria, UA Trace /HPF      Squamous Epithelial Cells, UA None Seen /HPF      Hyaline Casts, UA None Seen /LPF      Granular Casts, UA 0-2 /LPF      Methodology Manual Light Microscopy    Urinalysis With Culture If Indicated - Indwelling Urethral Catheter [[id number]]  (Abnormal) Collected: 04/02/23 0952    Specimen: Urine from Indwelling Urethral Catheter Updated: 04/02/23 1014     Color, UA Dark Yellow     Appearance, UA Clear     pH, UA 5.5     Specific [place], UA 1.036     Comment: Result obtained by Refractometer        Glucose, UA Negative     Ketones, UA Trace     Bilirubin, UA Negative     Blood, UA Negative     Protein, UA Trace     Leuk Esterase, UA Trace     Nitrite, UA Negative     Urobilinogen, UA 1.0 E.U./dL    Narrative:      In absence of clinical symptoms, the presence of pyuria, bacteria, and/or nitrites on the urinalysis result does not correlate with infection.    Blood Culture - Blood, Arm, Left [[id number]] Collected: 04/02/23 0920    Specimen: Blood from Arm, Left Updated: 04/02/23 0934    Blood Culture - Blood, Arm, Right [[id number]] Collected: 04/02/23 0919    Specimen: Blood from Arm, Right Updated: 04/02/23 0934    Comprehensive Metabolic Panel [[id number]]  (Abnormal) Collected: 04/02/23 0525    Specimen: Blood Updated: 04/02/23 0551     Glucose 119 mg/dL      BUN 12 mg/dL      Creatinine 0.68 mg/dL      Sodium 139 mmol/L      Potassium 3.8 mmol/L      Chloride 105 mmol/L      CO2 21.0 mmol/L      Calcium 9.2 mg/dL      Total Protein 6.5 g/dL      Albumin 3.5 g/dL      ALT (SGPT) 26 U/L      AST (SGOT) 22 U/L      Alkaline Phosphatase 96 U/L      Total Bilirubin 0.2 mg/dL      Globulin 3.0 gm/dL      A/G Ratio 1.2 g/dL       BUN/Creatinine Ratio 17.6     Anion Gap 13.0 mmol/L      eGFR 123.5 mL/min/1.73     Narrative:      GFR Normal >60  Chronic Kidney Disease <60  Kidney Failure <15      Magnesium [[id number]]  (Normal) Collected: 04/02/23 0525    Specimen: Blood Updated: 04/02/23 0551     Magnesium 2.2 mg/dL     CBC & Differential [[id number]]  (Abnormal) Collected: 04/02/23 0525    Specimen: Blood Updated: 04/02/23 0532    Narrative:      The following orders were created for panel order CBC & Differential.  Procedure                               Abnormality         Status                     ---------                               -----------         ------                     CBC Auto Differential[[id number]]        Abnormal            Final result                 Please view results for these tests on the individual orders.    CBC Auto Differential [[id number]]  (Abnormal) Collected: 04/02/23 0525    Specimen: Blood Updated: 04/02/23 0532     WBC 10.30 10*3/mm3      RBC 3.57 10*6/mm3      Hemoglobin 12.1 g/dL      Hematocrit 36.0 %      .8 fL      MCH 33.9 pg      MCHC 33.6 g/dL      RDW 14.6 %      RDW-SD 53.5 fl      MPV 9.6 fL      Platelets 407 10*3/mm3      Neutrophil % 69.3 %      Lymphocyte % 13.4 %      Monocyte % 9.8 %      Eosinophil % 1.9 %      Basophil % 1.6 %      Immature Grans % 4.0 %      Neutrophils, Absolute 7.14 10*3/mm3      Lymphocytes, Absolute 1.38 10*3/mm3      Monocytes, Absolute 1.01 10*3/mm3      Eosinophils, Absolute 0.20 10*3/mm3      Basophils, Absolute 0.16 10*3/mm3      Immature Grans, Absolute 0.41 10*3/mm3      nRBC 0.0 /100 WBC     POC Glucose Once [[id number]]  (Normal) Collected: 04/02/23 0032    Specimen: Blood Updated: 04/02/23 0507     Glucose 108 mg/dL      Comment: : [phone] OPHELIA Chase ID: [id number]             Culture Data:   No results found for: BLOODCX  No results found for: URINECX  No results found for: RESPCX  No results found for: WOUNDCX  No results found  for: STOOLCX  No components found for: BODYFLD    Radiology Data:   Imaging Results (Last 24 Hours)     Procedure Component Value Units Date/Time    XR Chest 1 View [[id number]] Collected: 04/02/23 0912     Updated: 04/02/23 0935    Narrative:      EXAM: Chest, 1 AP view  CLINICAL INDICATION: Intubated, fever    COMPARISON: Chest radiographs 3/23/2023    FINDINGS:    There is Dobbhoff tube coursing through the esophagus.  There is endotracheal tube 4.9 cm above levi.    There are stable mild perihilar and basilar interstitial changes,  as well as possible slight blunting of left costophrenic angle.    There is no pneumothorax or interval cardiomegaly.    There is no acute or aggressive osseous lesion.      Impression:      CONCLUSION:     1. Stable appearing endotracheal tube position.    2. Stable mild bilateral interstitial changes and possible small  left-sided pleural effusion. See report for other details.    Electronically signed by:  Kyrie Crabtree DO  4/2/2023 9:33 AM CDT  Workstation: [id number]          I have reviewed the patient's current medications.     Assessment/Plan     Active Hospital Problems    Diagnosis    • **Alcohol withdrawal    • Delirium tremens    • Anxiety disorder    • Affective disorder    • Transaminitis        Alcohol withdrawal-severe-currently intubated and continue with Ativan and propofol.  Psychiatry also managing.  Has not been tolerating weaning off of Ativan very well.  Ativan has been weaned down to 3 mg/h, psychiatry is also managing.    Fever- we will order cultures including blood cultures and urine culture, chest x-ray has also been ordered.  Tylenol was given.     Acute respiratory failure with hypoxia-continue with ventilator management.  Continue to wean as tolerated  Continue with ventilator management.  ABG has been repeated today, has been stable.     Staphylococcus pneumonia-continue with IV antibiotics, will monitor     DVT prophylaxis-Lovenox     Medical Decision  Making  Number and Complexity of problems: Several highly complex medical problems     Conditions and Status:        Condition is unchanged.     Discussed with: Nursing     Treatment Plan  As above     Care Planning  Shared decision making: Patient unable to participate  Code status and discussions: Full code     Disposition  Social Determinants of Health that impact treatment or disposition: Alcohol abuse  I expect the patient to be discharged to rehab in 4-5 days.         Critical care time-42 minutes      William Shook MD   04/02/23   13:45 CDT

## 2023-04-02 NOTE — PLAN OF CARE
Problem: Adult Inpatient Plan of Care  Goal: Plan of Care Review  Outcome: Ongoing, Progressing  Goal: Patient-Specific Goal (Individualized)  Outcome: Ongoing, Progressing  Goal: Absence of Hospital-Acquired Illness or Injury  Outcome: Ongoing, Progressing  Goal: Optimal Comfort and Wellbeing  Outcome: Ongoing, Progressing  Goal: Readiness for Transition of Care  Outcome: Ongoing, Progressing     Problem: Fall Injury Risk  Goal: Absence of Fall and Fall-Related Injury  Outcome: Ongoing, Progressing     Problem: Hypertension Comorbidity  Goal: Blood Pressure in Desired Range  Outcome: Ongoing, Progressing     Problem: Alcohol Withdrawal  Goal: Alcohol Withdrawal Symptom Control  Outcome: Ongoing, Progressing  Goal: Alcohol Withdrawal Symptom Control  Outcome: Ongoing, Progressing     Problem: Acute Neurologic Deterioration (Alcohol Withdrawal)  Goal: Optimal Neurologic Function  Outcome: Ongoing, Progressing     Problem: Substance Misuse (Alcohol Withdrawal)  Goal: Readiness for Change Identified  Outcome: Ongoing, Progressing     Problem: Restraint, Nonviolent  Goal: Absence of Harm or Injury  Outcome: Ongoing, Progressing     Problem: Skin Injury Risk Increased  Goal: Skin Health and Integrity  Outcome: Ongoing, Progressing  Intervention: Optimize Skin Protection  Recent Flowsheet Documentation  Taken 4/2/2023 0709 by Pilar Pearson RRT  Head of Bed (HOB) Positioning: HOB elevated     Problem: Communication Impairment (Mechanical Ventilation, Invasive)  Goal: Effective Communication  Outcome: Ongoing, Progressing  Goal: Effective Communication  Outcome: Ongoing, Progressing  Goal: Effective Communication  Outcome: Ongoing, Progressing  Goal: Effective Communication  Outcome: Ongoing, Progressing     Problem: Device-Related Complication Risk (Mechanical Ventilation, Invasive)  Goal: Optimal Device Function  Outcome: Ongoing, Progressing  Intervention: Optimize Device Care and Function  Recent Flowsheet  Documentation  Taken 4/2/2023 0709 by Pilar Pearson RRT  Airway Safety Measures: manual resuscitator/mask at bedside  Goal: Optimal Device Function  Outcome: Ongoing, Progressing  Intervention: Optimize Device Care and Function  Recent Flowsheet Documentation  Taken 4/2/2023 0709 by Pilar Pearson RRT  Airway Safety Measures: manual resuscitator/mask at bedside  Goal: Optimal Device Function  Outcome: Ongoing, Progressing  Intervention: Optimize Device Care and Function  Recent Flowsheet Documentation  Taken 4/2/2023 0709 by Pilar Pearson RRT  Airway Safety Measures: manual resuscitator/mask at bedside  Goal: Optimal Device Function  Outcome: Ongoing, Progressing  Intervention: Optimize Device Care and Function  Recent Flowsheet Documentation  Taken 4/2/2023 0709 by Pilar Pearson RRT  Airway Safety Measures: manual resuscitator/mask at bedside     Problem: Inability to Wean (Mechanical Ventilation, Invasive)  Goal: Mechanical Ventilation Liberation  Outcome: Ongoing, Progressing  Goal: Mechanical Ventilation Liberation  Outcome: Ongoing, Progressing  Goal: Mechanical Ventilation Liberation  Outcome: Ongoing, Progressing  Goal: Mechanical Ventilation Liberation  Outcome: Ongoing, Progressing     Problem: Nutrition Impairment (Mechanical Ventilation, Invasive)  Goal: Optimal Nutrition Delivery  Outcome: Ongoing, Progressing  Goal: Optimal Nutrition Delivery  Outcome: Ongoing, Progressing  Goal: Optimal Nutrition Delivery  Outcome: Ongoing, Progressing  Goal: Optimal Nutrition Delivery  Outcome: Ongoing, Progressing     Problem: Skin and Tissue Injury (Mechanical Ventilation, Invasive)  Goal: Absence of Device-Related Skin and Tissue Injury  Outcome: Ongoing, Progressing  Goal: Absence of Device-Related Skin and Tissue Injury  Outcome: Ongoing, Progressing  Goal: Absence of Device-Related Skin and Tissue Injury  Outcome: Ongoing, Progressing  Goal: Absence of Device-Related Skin and  Tissue Injury  Outcome: Ongoing, Progressing     Problem: Ventilator-Induced Lung Injury (Mechanical Ventilation, Invasive)  Goal: Absence of Ventilator-Induced Lung Injury  Outcome: Ongoing, Progressing  Intervention: Prevent Ventilator-Associated Pneumonia  Recent Flowsheet Documentation  Taken 4/2/2023 0709 by Pilar Pearson RRT  Head of Bed (HOB) Positioning: HOB elevated  Goal: Absence of Ventilator-Induced Lung Injury  Outcome: Ongoing, Progressing  Intervention: Prevent Ventilator-Associated Pneumonia  Recent Flowsheet Documentation  Taken 4/2/2023 0709 by Pilar Pearson RRT  Head of Bed (HOB) Positioning: HOB elevated  Goal: Absence of Ventilator-Induced Lung Injury  Outcome: Ongoing, Progressing  Intervention: Prevent Ventilator-Associated Pneumonia  Recent Flowsheet Documentation  Taken 4/2/2023 0709 by Pilar Pearson RRT  Head of Bed (HOB) Positioning: HOB elevated  Goal: Absence of Ventilator-Induced Lung Injury  Outcome: Ongoing, Progressing  Intervention: Prevent Ventilator-Associated Pneumonia  Recent Flowsheet Documentation  Taken 4/2/2023 0709 by Pilar Pearson RRT  Head of Bed (HOB) Positioning: HOB elevated     Problem: Activity and Energy Impairment (Excessive Substance Use)  Goal: Optimized Energy Level (Excessive Substance Use)  Outcome: Ongoing, Progressing     Problem: Behavior Regulation Impairment (Excessive Substance Use)  Goal: Improved Behavioral Control (Excessive Substance Use)  Outcome: Ongoing, Progressing     Problem: Decreased Participation and Engagement (Excessive Substance Use)  Goal: Increased Participation and Engagement (Excessive Substance Use)  Outcome: Ongoing, Progressing     Problem: Physiologic Impairment (Excessive Substance Use)  Goal: Improved Physiologic Symptoms (Excessive Substance Use)  Outcome: Ongoing, Progressing     Problem: Social, Occupational or Functional Impairment (Excessive Substance Use)  Goal: Enhanced Social,  Occupational or Functional Skills (Excessive Substance Use)  Outcome: Ongoing, Progressing   Goal Outcome Evaluation:  Pt is stable on current vent settings. No sbt at this time due to withdrawals pt is on ativan drip. Will continue to monitor.

## 2023-04-02 NOTE — PLAN OF CARE
Goal Outcome Evaluation:  Plan of Care Reviewed With: patient        Progress: no change  Outcome Evaluation: Pt remains intubated, sedated and on vent.  Pt on ativan at 3, Prop at 50, LR 75.  Pt spiked a Temp of 100.8 this morning.  Pt tolerating tube feeds.  Pt has not had a BM, may need ot increase bowel regiment. Will continue to monitor.

## 2023-04-03 ENCOUNTER — APPOINTMENT (OUTPATIENT)
Dept: GENERAL RADIOLOGY | Facility: HOSPITAL | Age: 38
DRG: 896 | End: 2023-04-03
Payer: COMMERCIAL

## 2023-04-03 PROBLEM — R50.9 FEVER: Status: ACTIVE | Noted: 2023-04-03

## 2023-04-03 PROBLEM — J15.211 STAPHYLOCOCCUS AUREUS PNEUMONIA: Status: ACTIVE | Noted: 2023-04-03

## 2023-04-03 PROBLEM — J96.01 ACUTE RESPIRATORY FAILURE WITH HYPOXIA AND HYPERCAPNIA: Status: ACTIVE | Noted: 2023-04-03

## 2023-04-03 PROBLEM — J96.02 ACUTE RESPIRATORY FAILURE WITH HYPOXIA AND HYPERCAPNIA: Status: ACTIVE | Noted: 2023-04-03

## 2023-04-03 LAB
ALBUMIN SERPL-MCNC: 3.8 G/DL (ref 3.5–5.2)
ALBUMIN/GLOB SERPL: 1.2 G/DL
ALP SERPL-CCNC: 95 U/L (ref 39–117)
ALT SERPL W P-5'-P-CCNC: 28 U/L (ref 1–41)
ANION GAP SERPL CALCULATED.3IONS-SCNC: 13 MMOL/L (ref 5–15)
AST SERPL-CCNC: 31 U/L (ref 1–40)
BACTERIA BLD CULT: ABNORMAL
BASOPHILS # BLD AUTO: 0.19 10*3/MM3 (ref 0–0.2)
BASOPHILS NFR BLD AUTO: 1.3 % (ref 0–1.5)
BILIRUB SERPL-MCNC: 0.2 MG/DL (ref 0–1.2)
BILIRUB UR QL STRIP: NEGATIVE
BUN SERPL-MCNC: 14 MG/DL (ref 6–20)
BUN/CREAT SERPL: 18.2 (ref 7–25)
CALCIUM SPEC-SCNC: 9.4 MG/DL (ref 8.6–10.5)
CHLORIDE SERPL-SCNC: 106 MMOL/L (ref 98–107)
CLARITY UR: CLEAR
CO2 SERPL-SCNC: 23 MMOL/L (ref 22–29)
COLOR UR: YELLOW
CREAT SERPL-MCNC: 0.77 MG/DL (ref 0.76–1.27)
D-LACTATE SERPL-SCNC: 1 MMOL/L (ref 0.5–2)
DEPRECATED RDW RBC AUTO: 55.4 FL (ref 37–54)
EGFRCR SERPLBLD CKD-EPI 2021: 119 ML/MIN/1.73
EOSINOPHIL # BLD AUTO: 0.23 10*3/MM3 (ref 0–0.4)
EOSINOPHIL NFR BLD AUTO: 1.6 % (ref 0.3–6.2)
ERYTHROCYTE [DISTWIDTH] IN BLOOD BY AUTOMATED COUNT: 14.7 % (ref 12.3–15.4)
GLOBULIN UR ELPH-MCNC: 3.2 GM/DL
GLUCOSE BLDC GLUCOMTR-MCNC: 111 MG/DL (ref 70–130)
GLUCOSE BLDC GLUCOMTR-MCNC: 112 MG/DL (ref 70–130)
GLUCOSE SERPL-MCNC: 112 MG/DL (ref 65–99)
GLUCOSE UR STRIP-MCNC: NEGATIVE MG/DL
HCT VFR BLD AUTO: 38 % (ref 37.5–51)
HGB BLD-MCNC: 12.6 G/DL (ref 13–17.7)
HGB UR QL STRIP.AUTO: NEGATIVE
IMM GRANULOCYTES # BLD AUTO: 0.65 10*3/MM3 (ref 0–0.05)
IMM GRANULOCYTES NFR BLD AUTO: 4.4 % (ref 0–0.5)
KETONES UR QL STRIP: NEGATIVE
LEUKOCYTE ESTERASE UR QL STRIP.AUTO: NEGATIVE
LYMPHOCYTES # BLD AUTO: 1.43 10*3/MM3 (ref 0.7–3.1)
LYMPHOCYTES NFR BLD AUTO: 9.7 % (ref 19.6–45.3)
MCH RBC QN AUTO: 33.7 PG (ref 26.6–33)
MCHC RBC AUTO-ENTMCNC: 33.2 G/DL (ref 31.5–35.7)
MCV RBC AUTO: 101.6 FL (ref 79–97)
MONOCYTES # BLD AUTO: 1.42 10*3/MM3 (ref 0.1–0.9)
MONOCYTES NFR BLD AUTO: 9.6 % (ref 5–12)
NEUTROPHILS NFR BLD AUTO: 10.88 10*3/MM3 (ref 1.7–7)
NEUTROPHILS NFR BLD AUTO: 73.4 % (ref 42.7–76)
NITRITE UR QL STRIP: NEGATIVE
NRBC BLD AUTO-RTO: 0.1 /100 WBC (ref 0–0.2)
PH UR STRIP.AUTO: 7 [PH] (ref 5–9)
PLATELET # BLD AUTO: 340 10*3/MM3 (ref 140–450)
PMV BLD AUTO: 10 FL (ref 6–12)
POTASSIUM SERPL-SCNC: 3.9 MMOL/L (ref 3.5–5.2)
PROCALCITONIN SERPL-MCNC: 0.09 NG/ML (ref 0–0.25)
PROT SERPL-MCNC: 7 G/DL (ref 6–8.5)
PROT UR QL STRIP: NEGATIVE
RBC # BLD AUTO: 3.74 10*6/MM3 (ref 4.14–5.8)
SODIUM SERPL-SCNC: 142 MMOL/L (ref 136–145)
SP GR UR STRIP: 1.01 (ref 1–1.03)
UROBILINOGEN UR QL STRIP: NORMAL
WBC NRBC COR # BLD: 14.8 10*3/MM3 (ref 3.4–10.8)

## 2023-04-03 PROCEDURE — 83605 ASSAY OF LACTIC ACID: CPT | Performed by: INTERNAL MEDICINE

## 2023-04-03 PROCEDURE — 25010000002 LORAZEPAM PER 2 MG: Performed by: PSYCHIATRY & NEUROLOGY

## 2023-04-03 PROCEDURE — 25010000002 FENTANYL CITRATE (PF) 50 MCG/ML SOLUTION: Performed by: INTERNAL MEDICINE

## 2023-04-03 PROCEDURE — 25010000002 PROPOFOL 10 MG/ML EMULSION

## 2023-04-03 PROCEDURE — 80053 COMPREHEN METABOLIC PANEL: CPT | Performed by: FAMILY MEDICINE

## 2023-04-03 PROCEDURE — 82962 GLUCOSE BLOOD TEST: CPT

## 2023-04-03 PROCEDURE — 25010000002 PHENOBARBITAL PER 120 MG: Performed by: PSYCHIATRY & NEUROLOGY

## 2023-04-03 PROCEDURE — 87070 CULTURE OTHR SPECIMN AEROBIC: CPT | Performed by: INTERNAL MEDICINE

## 2023-04-03 PROCEDURE — 87147 CULTURE TYPE IMMUNOLOGIC: CPT | Performed by: INTERNAL MEDICINE

## 2023-04-03 PROCEDURE — 71045 X-RAY EXAM CHEST 1 VIEW: CPT

## 2023-04-03 PROCEDURE — 94003 VENT MGMT INPAT SUBQ DAY: CPT

## 2023-04-03 PROCEDURE — 25010000002 ENOXAPARIN PER 10 MG

## 2023-04-03 PROCEDURE — 94761 N-INVAS EAR/PLS OXIMETRY MLT: CPT

## 2023-04-03 PROCEDURE — 94799 UNLISTED PULMONARY SVC/PX: CPT

## 2023-04-03 PROCEDURE — 81003 URINALYSIS AUTO W/O SCOPE: CPT | Performed by: INTERNAL MEDICINE

## 2023-04-03 PROCEDURE — 99232 SBSQ HOSP IP/OBS MODERATE 35: CPT | Performed by: PSYCHIATRY & NEUROLOGY

## 2023-04-03 PROCEDURE — 87205 SMEAR GRAM STAIN: CPT | Performed by: INTERNAL MEDICINE

## 2023-04-03 PROCEDURE — 25010000002 VANCOMYCIN 10 G RECONSTITUTED SOLUTION: Performed by: INTERNAL MEDICINE

## 2023-04-03 PROCEDURE — 85025 COMPLETE CBC W/AUTO DIFF WBC: CPT | Performed by: FAMILY MEDICINE

## 2023-04-03 PROCEDURE — 84145 PROCALCITONIN (PCT): CPT | Performed by: INTERNAL MEDICINE

## 2023-04-03 RX ORDER — FENTANYL CITRATE 50 UG/ML
25 INJECTION, SOLUTION INTRAMUSCULAR; INTRAVENOUS
Status: DISCONTINUED | OUTPATIENT
Start: 2023-04-03 | End: 2023-04-05 | Stop reason: HOSPADM

## 2023-04-03 RX ADMIN — ACETAMINOPHEN 650 MG: 325 TABLET ORAL at 04:32

## 2023-04-03 RX ADMIN — PHENOBARBITAL SODIUM 65 MG: 65 INJECTION INTRAMUSCULAR at 08:01

## 2023-04-03 RX ADMIN — FENTANYL CITRATE 25 MCG: 50 INJECTION, SOLUTION INTRAMUSCULAR; INTRAVENOUS at 00:57

## 2023-04-03 RX ADMIN — AZTREONAM 2 G: 2 INJECTION, POWDER, LYOPHILIZED, FOR SOLUTION INTRAMUSCULAR; INTRAVENOUS at 08:35

## 2023-04-03 RX ADMIN — AZTREONAM 2 G: 2 INJECTION, POWDER, LYOPHILIZED, FOR SOLUTION INTRAMUSCULAR; INTRAVENOUS at 22:03

## 2023-04-03 RX ADMIN — Medication 10 ML: at 20:05

## 2023-04-03 RX ADMIN — Medication 10 ML: at 08:02

## 2023-04-03 RX ADMIN — VANCOMYCIN HYDROCHLORIDE 1500 MG: 10 INJECTION, POWDER, LYOPHILIZED, FOR SOLUTION INTRAVENOUS at 20:16

## 2023-04-03 RX ADMIN — VANCOMYCIN HYDROCHLORIDE 1750 MG: 10 INJECTION, POWDER, LYOPHILIZED, FOR SOLUTION INTRAVENOUS at 08:03

## 2023-04-03 RX ADMIN — DOCUSATE SODIUM 50 MG AND SENNOSIDES 8.6 MG 2 TABLET: 8.6; 5 TABLET, FILM COATED ORAL at 08:01

## 2023-04-03 RX ADMIN — DOCUSATE SODIUM 100 MG: 50 LIQUID ORAL at 08:00

## 2023-04-03 RX ADMIN — PANTOPRAZOLE SODIUM 40 MG: 40 INJECTION, POWDER, FOR SOLUTION INTRAVENOUS at 05:24

## 2023-04-03 RX ADMIN — ENOXAPARIN SODIUM 40 MG: 40 INJECTION SUBCUTANEOUS at 08:00

## 2023-04-03 RX ADMIN — SODIUM CHLORIDE, POTASSIUM CHLORIDE, SODIUM LACTATE AND CALCIUM CHLORIDE 75 ML/HR: 600; 310; 30; 20 INJECTION, SOLUTION INTRAVENOUS at 17:18

## 2023-04-03 RX ADMIN — CHLORHEXIDINE GLUCONATE 0.12% ORAL RINSE 15 ML: 1.2 LIQUID ORAL at 08:00

## 2023-04-03 RX ADMIN — LORAZEPAM 3 MG/HR: 2 INJECTION INTRAMUSCULAR; INTRAVENOUS at 05:25

## 2023-04-03 RX ADMIN — FENTANYL CITRATE 25 MCG: 50 INJECTION, SOLUTION INTRAMUSCULAR; INTRAVENOUS at 21:37

## 2023-04-03 RX ADMIN — AZTREONAM 2 G: 2 INJECTION, POWDER, LYOPHILIZED, FOR SOLUTION INTRAMUSCULAR; INTRAVENOUS at 13:04

## 2023-04-03 RX ADMIN — DOXYCYCLINE 100 MG: 100 INJECTION, POWDER, LYOPHILIZED, FOR SOLUTION INTRAVENOUS at 00:57

## 2023-04-03 RX ADMIN — LORAZEPAM 4 MG: 2 INJECTION INTRAMUSCULAR; INTRAVENOUS at 00:20

## 2023-04-03 RX ADMIN — ATENOLOL 100 MG: 50 TABLET ORAL at 08:00

## 2023-04-03 RX ADMIN — SODIUM CHLORIDE, POTASSIUM CHLORIDE, SODIUM LACTATE AND CALCIUM CHLORIDE 75 ML/HR: 600; 310; 30; 20 INJECTION, SOLUTION INTRAVENOUS at 04:32

## 2023-04-03 RX ADMIN — PROPOFOL 50 MCG/KG/MIN: 10 INJECTION, EMULSION INTRAVENOUS at 10:18

## 2023-04-03 RX ADMIN — NICOTINE 1 PATCH: 21 PATCH, EXTENDED RELEASE TRANSDERMAL at 04:25

## 2023-04-03 RX ADMIN — PHENOBARBITAL SODIUM 65 MG: 65 INJECTION INTRAMUSCULAR at 16:24

## 2023-04-03 RX ADMIN — CHLORHEXIDINE GLUCONATE 0.12% ORAL RINSE 15 ML: 1.2 LIQUID ORAL at 20:05

## 2023-04-03 RX ADMIN — PROPOFOL 50 MCG/KG/MIN: 10 INJECTION, EMULSION INTRAVENOUS at 20:52

## 2023-04-03 RX ADMIN — DOCUSATE SODIUM 50 MG AND SENNOSIDES 8.6 MG 2 TABLET: 8.6; 5 TABLET, FILM COATED ORAL at 20:05

## 2023-04-03 RX ADMIN — PROPOFOL 50 MCG/KG/MIN: 10 INJECTION, EMULSION INTRAVENOUS at 05:24

## 2023-04-03 RX ADMIN — LORAZEPAM 2 MG: 2 INJECTION INTRAMUSCULAR; INTRAVENOUS at 23:14

## 2023-04-03 RX ADMIN — PROPOFOL 50 MCG/KG/MIN: 10 INJECTION, EMULSION INTRAVENOUS at 01:36

## 2023-04-03 RX ADMIN — PHENOBARBITAL SODIUM 65 MG: 65 INJECTION INTRAMUSCULAR at 23:57

## 2023-04-03 NOTE — DISCHARGE PLACEMENT REQUEST
"Juan A Sky \"Ti\" (37 y.o. Male)     Date of Birth   1985    Social Security Number       Address   [address] [place] [address]    Home Phone   [phone]    MRN   [id number]       Buddhist   Moravian    Marital Status                               Admission Date   3/22/23    Admission Type   Urgent    Admitting Provider   Harshal Jackson MD    Attending Provider   Nikki Stallings MD    Department, Room/Bed   [facility] CRITICAL CARE STEPDOWN, 19/A       Discharge Date       Discharge Disposition       Discharge Destination                               Attending Provider: Nikki Stallings MD    Allergies: Penicillins, Ibuprofen    Isolation: None   Infection: None   Code Status: CPR    Ht: 182.9 cm (72\")   Wt: 86.9 kg (191 lb 9.3 oz)    Admission Cmt: None   Principal Problem: Alcohol withdrawal [F10.939]                 Active Insurance as of 3/22/2023     Primary Coverage     Payor Plan Insurance Group Employer/Plan Group    [employer] [place] [employer]ICA[employer]      Payor Plan Address Payor Plan Phone Number Payor Plan Fax Number Effective Dates    [address] [address] [phone]  3/21/2023 - None Entered    [facility] [id number]       Subscriber Name Subscriber Birth Date Member ID       JUAN A SKY 1985 [id number]                 Emergency Contacts      (Rel.) Home Phone Work Phone Mobile Phone    JOSE DANIELCHRISSIE (Spouse) -- -- [phone]              "

## 2023-04-03 NOTE — PLAN OF CARE
Problem: Communication Impairment (Mechanical Ventilation, Invasive)  Goal: Effective Communication  Outcome: Ongoing, Not Progressing   Goal Outcome Evaluation:  Plan of Care Reviewed With: patient        Progress: improving  Outcome Evaluation: VSS. Patient remains sedated and on ventilation.

## 2023-04-03 NOTE — PLAN OF CARE
Goal Outcome Evaluation:      Patient is stable on current settings. Will continue to monitor. Patient is not a candidate for SBT at this time.

## 2023-04-03 NOTE — PROGRESS NOTES
Psychiatry Progress Note    4/3/2023    Chief Complaint: delirium tremens    Subjective:  Patient is a 37 y.o. male who was hospitalized for delirium tremens.    Patient is intubated and unable to interact.    Ativan decreased to 2mg/hr yesterday but was increased back to 3mg/hr overnight due to agitation.  Patient was mildly tachycardic last night.  His BP has been stable.  Patient is currently at phenobarb at 65mg q8hrs.    Objective     Vital Signs    Vitals:    04/03/23 1200 04/03/23 1300 04/03/23 1317 04/03/23 1400   BP: 92/55 90/51  96/60   BP Location: Left arm Left arm  Left arm   Patient Position: Lying Lying  Lying   Pulse: 69 66 64 62   Resp: 16 16 16 16   Temp: 98.9 °F (37.2 °C)      TempSrc: Axillary      SpO2: 93% 95% 100% 95%   Weight:       Height:           Physical Exam: as of today, 04/03/23   General Appearance: intubated and sedated,  Hygiene:   fair  Gait & Station: in bed intubated and sedated  Musculoskeletal: No tremors or abnormal involuntary movements    Mental Status Exam: as of today, 04/03/23   Cooperation:  intubated and sedated  Eye Contact:  Closed  Psychomotor Behavior:  intubated and sedated  Mood: intubated and sedated and unable to answer  Affect:  asleep  Speech:  intubated and nonverbal  Thought Process:  intubated and nonverbal  Associations: intubated and nonverbal  Thought Content:     intubated and nonverbal   Suicidal:  None evidenced   Homicidal:  None evidenced   Hallucinations:  Not demonstrated today due to being intubated   Delusion:  Unable to demonstrate  Cognitive Functioning:   Consciousness: intubated and sedated  Reliability:  unable to assess due to being intubated  Insight:  unable to assess due to being intubated  Judgement:  unable to assess due to being intubated  Impulse Control:  unable to assess due to being intubated    Lab Results: Results source: EMR   Lab Results (last 24 hours)     Procedure Component Value Units Date/Time    Blood Culture ID, PCR -  Blood, Arm, Right [[id number]]  (Abnormal) Collected: 04/02/23 0919    Specimen: Blood from Arm, Right Updated: 04/03/23 1433     BCID, PCR Staph spp, not aureus or lugdunensis. Identification by BCID2 PCR.    POC Glucose Once [[id number]]  (Normal) Collected: 04/03/23 1106    Specimen: Blood Updated: 04/03/23 1149     Glucose 111 mg/dL      Comment: RN NotifiedOperator: [id number] CHANNING AMANDAMeter ID: [id number]       Respiratory Culture - Sputum, ET Suction [[id number]]  (Abnormal) Collected: 04/02/23 1047    Specimen: Sputum from ET Suction Updated: 04/03/23 1123     Respiratory Culture Moderate growth (3+) Staphylococcus aureus      Light growth (2+) Normal Respiratory Sangita     Gram Stain Many (4+) WBCs per low power field      Rare (1+) Epithelial cells per low power field      Mixed bacterial sangita    Blood Culture - Blood, Arm, Left [[id number]]  (Normal) Collected: 04/02/23 0920    Specimen: Blood from Arm, Left Updated: 04/03/23 0945     Blood Culture No growth at 24 hours    Urinalysis With Culture If Indicated - Indwelling Urethral Catheter [[id number]]  (Normal) Collected: 04/03/23 0817    Specimen: Urine from Indwelling Urethral Catheter Updated: 04/03/23 0847     Color, UA Yellow     Appearance, UA Clear     pH, UA 7.0     Specific Gravity, UA 1.014     Glucose, UA Negative     Ketones, UA Negative     Bilirubin, UA Negative     Blood, UA Negative     Protein, UA Negative     Leuk Esterase, UA Negative     Nitrite, UA Negative     Urobilinogen, UA 1.0 E.U./dL    Narrative:      In absence of clinical symptoms, the presence of pyuria, bacteria, and/or nitrites on the urinalysis result does not correlate with infection.  Urine microscopic not indicated.    Respiratory Culture - Sputum, ET Suction [[id number]] Collected: 04/03/23 0719    Specimen: Sputum from ET Suction Updated: 04/03/23 0823     Gram Stain Moderate (3+) WBCs per low power field      Rare (1+) Epithelial cells per low power field       "Mixed bacterial derrick    Blood Culture - Blood, Arm, Right [[id number]]  (Abnormal) Collected: 04/02/23 0919    Specimen: Blood from Arm, Right Updated: 04/03/23 0753     Blood Culture Abnormal Stain     Gram Stain Aerobic Bottle Gram positive cocci in clusters      Anaerobic Bottle Gram positive cocci in clusters     Comment: 2 bottles of 4 drawn positive for gram positive cocci in cluesters       Narrative:      1 of 4 bottles drawn positive for gram positive cocci    Procalcitonin [[id number]]  (Normal) Collected: 04/03/23 0659    Specimen: Blood Updated: 04/03/23 0732     Procalcitonin 0.09 ng/mL     Narrative:      As a Marker for Sepsis (Non-Neonates):    1. <0.5 ng/mL represents a low risk of severe sepsis and/or septic shock.  2. >2 ng/mL represents a high risk of severe sepsis and/or septic shock.    As a Marker for Lower Respiratory Tract Infections that require antibiotic therapy:    PCT on Admission    Antibiotic Therapy       6-12 Hrs later    >0.5                Strongly Recommended  >0.25 - <0.5        Recommended   0.1 - 0.25          Discouraged              Remeasure/reassess PCT  <0.1                Strongly Discouraged     Remeasure/reassess PCT    As 28 day mortality risk marker: \"Change in Procalcitonin Result\" (>80% or <=80%) if Day 0 (or Day 1) and Day 4 values are available. Refer to http://[email or web address] [email or web address]-pct-[email or web address]    Change in PCT <=80%  A decrease of PCT levels below or equal to 80% defines a positive change in PCT test result representing a higher risk for 28-day all-cause mortality of patients diagnosed with severe sepsis for septic shock.    Change in PCT >80%  A decrease of PCT levels of more than 80% defines a negative change in PCT result representing a lower risk for 28-day all-cause mortality of patients diagnosed with severe sepsis or septic shock.       Lactic Acid, Plasma [[id number]]  (Normal) Collected: 04/03/23 0659    Specimen: Blood Updated: 04/03/23 0719     Lactate 1.0 " mmol/L     Comprehensive Metabolic Panel [[id number]]  (Abnormal) Collected: 04/03/23 0528    Specimen: Blood Updated: 04/03/23 0603     Glucose 112 mg/dL      BUN 14 mg/dL      Creatinine 0.77 mg/dL      Sodium 142 mmol/L      Potassium 3.9 mmol/L      Chloride 106 mmol/L      CO2 23.0 mmol/L      Calcium 9.4 mg/dL      Total Protein 7.0 g/dL      Albumin 3.8 g/dL      ALT (SGPT) 28 U/L      AST (SGOT) 31 U/L      Alkaline Phosphatase 95 U/L      Total Bilirubin 0.2 mg/dL      Globulin 3.2 gm/dL      A/G Ratio 1.2 g/dL      BUN/Creatinine Ratio 18.2     Anion Gap 13.0 mmol/L      eGFR 119.0 mL/min/1.73     Narrative:      GFR Normal >60  Chronic Kidney Disease <60  Kidney Failure <15      CBC & Differential [[id number]]  (Abnormal) Collected: 04/03/23 0528    Specimen: Blood Updated: 04/03/23 0551    Narrative:      The following orders were created for panel order CBC & Differential.  Procedure                               Abnormality         Status                     ---------                               -----------         ------                     CBC Auto Differential[[id number]]        Abnormal            Final result               Scan Slide[[id number]]                                                                    Please view results for these tests on the individual orders.    CBC Auto Differential [[id number]]  (Abnormal) Collected: 04/03/23 0528    Specimen: Blood Updated: 04/03/23 0551     WBC 14.80 10*3/mm3      RBC 3.74 10*6/mm3      Hemoglobin 12.6 g/dL      Hematocrit 38.0 %      .6 fL      MCH 33.7 pg      MCHC 33.2 g/dL      RDW 14.7 %      RDW-SD 55.4 fl      MPV 10.0 fL      Platelets 340 10*3/mm3      Neutrophil % 73.4 %      Lymphocyte % 9.7 %      Monocyte % 9.6 %      Eosinophil % 1.6 %      Basophil % 1.3 %      Immature Grans % 4.4 %      Neutrophils, Absolute 10.88 10*3/mm3      Lymphocytes, Absolute 1.43 10*3/mm3      Monocytes, Absolute 1.42 10*3/mm3      Eosinophils,  Absolute 0.23 10*3/mm3      Basophils, Absolute 0.19 10*3/mm3      Immature Grans, Absolute 0.65 10*3/mm3      nRBC 0.1 /100 WBC           Radiology Results:  Imaging Results (Last 24 Hours)     Procedure Component Value Units Date/Time    XR Chest 1 View [[id number]] Collected: 04/03/23 0442     Updated: 04/03/23 0519    Narrative:      EXAM: Single portable XR view of the chest  INDICATION: respiratory failure  COMPARISON: 4/2/2023 radiograph    FINDINGS:  Support lines and devices are stable in position.  The cardiomediastinal silhouette is stable.   Slightly increased pulmonary vascular congestive changes. No  large pleural effusion, visible pneumothorax, or focal  consolidation.      Impression:        Slightly increased pulmonary vascular congestive changes.    Electronically signed by:  Jose Maria Smith MD  4/3/2023 5:17 AM  CDT Workstation: [id number]          Medicine:   Current Facility-Administered Medications:   •  [START ON 4/5/2023] !Vancomycin Level Draw Needed, , Does not apply, Once, Behroozi, Saeid, MD  •  acetaminophen (TYLENOL) tablet 650 mg, 650 mg, Oral, Q4H PRN, 650 mg at 04/03/23 0432 **OR** [DISCONTINUED] acetaminophen (TYLENOL) 160 MG/5ML solution 650 mg, 650 mg, Oral, Q4H PRN **OR** acetaminophen (TYLENOL) suppository 650 mg, 650 mg, Rectal, Q4H PRN, Harshal Jackson MD  •  atenolol (TENORMIN) tablet 100 mg, 100 mg, Oral, Daily, Harshal Jackson MD, 100 mg at 04/03/23 0800  •  aztreonam (AZACTAM) 2 g/100 mL 0.9% NS (mbp), 2 g, Intravenous, Q8H, Behroozi, Saeid, MD, 2 g at 04/03/23 1304  •  sennosides-docusate (PERICOLACE) 8.6-50 MG per tablet 2 tablet, 2 tablet, Oral, BID, 2 tablet at 04/03/23 0801 **AND** polyethylene glycol (MIRALAX) packet 17 g, 17 g, Oral, Daily PRN, 17 g at 04/02/23 1019 **AND** bisacodyl (DULCOLAX) EC tablet 5 mg, 5 mg, Oral, Daily PRN **AND** bisacodyl (DULCOLAX) suppository 10 mg, 10 mg, Rectal, Daily PRN, William Shook MD  •  chlorhexidine  (PERIDEX) 0.12 % solution 15 mL, 15 mL, Mouth/Throat, Q12H, Bertrand Conteh MD, 15 mL at 23 08  •  docusate sodium (COLACE) liquid 100 mg, 100 mg, Oral, Daily, William Shook MD, 100 mg at 23 08  •  Enoxaparin Sodium (LOVENOX) syringe 40 mg, 40 mg, Subcutaneous, Daily, Harshal Jackson MD, 40 mg at 23 08  •  fentaNYL citrate (PF) (SUBLIMAZE) injection 25 mcg, 25 mcg, Intravenous, Q2H PRN, Behroozi, Saeid, MD, 25 mcg at 23 0057  •  hydrALAZINE (APRESOLINE) injection 10 mg, 10 mg, Intravenous, Q6H PRN, William Shook MD, 10 mg at 23 0747  •  lactated ringers infusion, 75 mL/hr, Intravenous, Continuous, Jan Saavedra MD, Last Rate: 75 mL/hr at 23 06, 75 mL/hr at 23  •  LORazepam (ATIVAN) 100 mg in sodium chloride 0.9 % 100 mL infusion, 0.5-10 mg/hr, Intravenous, Titrated, Omkar Hope II, MD, Last Rate: 3 mL/hr at 23 0600, 3 mg/hr at 23  •  LORazepam (ATIVAN) tablet 1 mg, 1 mg, Oral, Q2H PRN **OR** LORazepam (ATIVAN) injection 1 mg, 1 mg, Intravenous, Q2H PRN **OR** LORazepam (ATIVAN) tablet 2 mg, 2 mg, Oral, Q1H PRN **OR** LORazepam (ATIVAN) injection 2 mg, 2 mg, Intravenous, Q1H PRN **OR** LORazepam (ATIVAN) injection 2 mg, 2 mg, Intravenous, Q15 Min PRN **OR** LORazepam (ATIVAN) injection 2 mg, 2 mg, Intramuscular, Q15 Min PRN, Omkar Hope II, MD  •  Magnesium Sulfate - Total Dose 10 grams - Magnesium 1 or Less, 2 g, Intravenous, PRN **OR** Magnesium Sulfate - Total Dose 6 grams - Magnesium 1.1 - 1.5, 2 g, Intravenous, PRN **OR** Magnesium Sulfate - Total Dose 4 grams - Magnesium 1.6 - 1.9, 4 g, Intravenous, PRN, Rhona Hinds, APRN, Last Rate: 25 mL/hr at 23 1226, 4 g at 23 1226  •  [] morphine injection 2 mg, 2 mg, Intravenous, Q4H PRN, 2 mg at 23 0843 **AND** naloxone (NARCAN) injection 0.4 mg, 0.4 mg, Intravenous, Q5 Min PRN, Harshal Jackson MD  •  nicotine (NICODERM  CQ) 21 MG/24HR patch 1 patch, 1 patch, Transdermal, Q24H, Harshal Jackson MD, 1 patch at 04/03/23 0425  •  ondansetron (ZOFRAN) tablet 4 mg, 4 mg, Oral, Q6H PRN **OR** ondansetron (ZOFRAN) injection 4 mg, 4 mg, Intravenous, Q6H PRN, Harshal Jackson MD  •  pantoprazole (PROTONIX) injection 40 mg, 40 mg, Intravenous, Q AM, Bertrand Conteh MD, 40 mg at 04/03/23 0524  •  Pharmacy to dose vancomycin, , Does not apply, Continuous PRN, Behroozi, Saeid, MD  •  PHENobarbital 65 mg in sodium chloride 0.9 % 100 mL IVPB, 65 mg, Intravenous, Q8H, Omkar Hope II, MD, 65 mg at 04/03/23 0801  •  potassium chloride (MICRO-K) CR capsule 40 mEq, 40 mEq, Oral, PRN, 40 mEq at 03/23/23 1859 **OR** potassium chloride (KLOR-CON) packet 40 mEq, 40 mEq, Oral, PRN, 40 mEq at 03/27/23 0816 **OR** potassium chloride 10 mEq in 100 mL IVPB, 10 mEq, Intravenous, Q1H PRN, Rhona Hinds APRN  •  prochlorperazine (COMPAZINE) injection 2.5 mg, 2.5 mg, Intravenous, Q6H PRN, Harshal Jackson MD, 2.5 mg at 03/22/23 0317  •  propofol (DIPRIVAN) infusion 10 mg/mL 100 mL, 5-50 mcg/kg/min, Intravenous, Titrated, Harshal Jackson MD, Last Rate: 25.6 mL/hr at 04/03/23 1305, 50 mcg/kg/min at 04/03/23 1305  •  sodium chloride 0.9 % flush 10 mL, 10 mL, Intravenous, Q12H, Harshal Jackson MD, 10 mL at 04/03/23 0802  •  sodium chloride 0.9 % flush 10 mL, 10 mL, Intravenous, PRN, Harshal Jackson MD  •  sodium chloride 0.9 % infusion 40 mL, 40 mL, Intravenous, PRN, Harshal Jackson MD  •  vancomycin 1500 mg/500 mL 0.9% NS IVPB ([facility]), 1,500 mg, Intravenous, Q12H, Behroozi, Saeid, MD    Diagnoses/Assessment:     Alcohol withdrawal    Transaminitis    Delirium tremens    Anxiety disorder    Affective disorder      Treatment Plan:    Alcohol Withdrawal/Delirium Tremens:              --Cont Phenobart 65mg q8hrs.  Plan to taper once ativan dose down to 1mg/hr.              --Cont CIIWA and PRN ativan               --Decrease Ativan drip to 2.5mg/hr.  Continue at this dose over night.              --Cont ativan 4mg q1hrs PRN for agitation and autonomic instability.      Anxiety & Affective Disorder:              --Will consider psychotherapeutic agents after extubation and management of the detox.    HTN:    --Continued atenolol 100mg daily.   --Treat BP elevation with PRN ativan for withdrawal.    Tanya Perez MD  04/03/23 at 14:38 CDT

## 2023-04-03 NOTE — PROGRESS NOTES
"  Pharmacokinetics by Pharmacy - Vancomycin    Jackson Acuña is a 37 y.o. male   [Ht: 182.9 cm (72\"); Wt: 86.9 kg (191 lb 9.3 oz)] is being started on Vancomycin for sepsis. Consult is for 7 days.   Pt is also on Aztreonam    Estimated Creatinine Clearance: 163 mL/min (by C-G formula based on SCr of 0.77 mg/dL).   Creatinine   Date Value Ref Range Status   04/03/2023 0.77 0.76 - 1.27 mg/dL Final   04/02/2023 0.68 (L) 0.76 - 1.27 mg/dL Final   03/31/2023 0.71 (L) 0.76 - 1.27 mg/dL Final      Lab Results   Component Value Date    WBC 14.80 (H) 04/03/2023    WBC 10.30 04/02/2023    WBC 8.69 03/31/2023      Temp Readings from Last 1 Encounters:   04/03/23 98.9 °F (37.2 °C) (Axillary)      C-Reactive Protein   Date Value Ref Range Status   06/16/2021 0.59 (H) 0.00 - 0.50 mg/dL Final     Lactate   Date Value Ref Range Status   04/03/2023 1.0 0.5 - 2.0 mmol/L Final   03/27/2023 1.3 0.5 - 2.0 mmol/L Final          Baseline culture results:  Microbiology Results (last 10 days)       Procedure Component Value - Date/Time    Respiratory Culture - Sputum, ET Suction [[id number]] Collected: 04/03/23 0719    Lab Status: Preliminary result Specimen: Sputum from ET Suction Updated: 04/03/23 0823     Gram Stain Moderate (3+) WBCs per low power field      Rare (1+) Epithelial cells per low power field      Mixed bacterial derrick    Respiratory Culture - Sputum, ET Suction [[id number]]  (Abnormal) Collected: 04/02/23 1048    Lab Status: Preliminary result Specimen: Sputum from ET Suction Updated: 04/03/23 1124     Respiratory Culture Moderate growth (3+) Staphylococcus aureus      Light growth (2+) Normal Respiratory Derrick     Gram Stain Many (4+) WBCs per low power field      Rare (1+) Epithelial cells per low power field      Mixed bacterial derrick    Blood Culture - Blood, Arm, Left [[id number]]  (Normal) Collected: 04/02/23 0920    Lab Status: Preliminary result Specimen: Blood from Arm, Left Updated: 04/03/23 0939     Blood Culture No " growth at 24 hours    Blood Culture - Blood, Arm, Right [[id number]]  (Abnormal) Collected: 04/02/23 0919    Lab Status: Preliminary result Specimen: Blood from Arm, Right Updated: 04/03/23 0753     Blood Culture Abnormal Stain     Gram Stain Aerobic Bottle Gram positive cocci in clusters      Anaerobic Bottle Gram positive cocci in clusters     Comment: 2 bottles of 4 drawn positive for gram positive cocci in cluesters       Narrative:      1 of 4 bottles drawn positive for gram positive cocci    Respiratory Culture - Aspirate, ET Suction [[id number]]  (Abnormal)  (Susceptibility) Collected: 03/27/23 1333    Lab Status: Final result Specimen: Aspirate from ET Suction Updated: 03/29/23 0808     Respiratory Culture Heavy growth (4+) Staphylococcus aureus      Light growth (2+) Normal Respiratory Derrick     Gram Stain Moderate (3+) WBCs per low power field      Rare (1+) Epithelial cells per low power field      Mixed bacterial derrick    Susceptibility        Staphylococcus aureus      BOONE      Clindamycin Resistant      Inducible Clindamycin Resistance Positive      Oxacillin Susceptible      Tetracycline Susceptible      Trimethoprim + Sulfamethoxazole Susceptible      Vancomycin Susceptible                       Susceptibility Comments       Staphylococcus aureus    This isolate is presumed to be clindamycin resistant based on detection of inducible clindamycin resistance.  Clindamycin may still be effective in some patients.  This isolate is presumed to be clindamycin resistant based on detection of inducible clindamycin resistance.  Clindamycin may still be effective in some patients.               Blood Culture - Blood, Arm, Left [[id number]]  (Normal) Collected: 03/27/23 1311    Lab Status: Final result Specimen: Blood from Arm, Left Updated: 04/01/23 1330     Blood Culture No growth at 5 days    Blood Culture - Blood, Wrist, Right [[id number]]  (Normal) Collected: 03/27/23 1310    Lab Status: Final result Specimen:  Blood from Wrist, Right Updated: 04/01/23 1330     Blood Culture No growth at 5 days          New/repeat Respiratory Cultures are pending  BC pending      Plan  Initiated Vancomycin 1750  x 1 dose was given at 4/3 0730  then was dosed at 1500mg IVPB Q12H to start at 2000. Will order Vancomycin peak level 4/4 at 2230 and  trough level 4/5 at 0730.  Calculated AUC is 473.38 (goal 400-600), calculated trough level is 10.75  (goal 10-20) and calculated peak level is 32.03 (goal 30-40).  Pharmacy will monitor renal function and adjust dose accordingly.    Deana Gar, PharmD  04/03/23 13:54 CDT

## 2023-04-03 NOTE — PROGRESS NOTES
Pt remains on vent. No setting changes at this time. Pt excessively coughs when suctioned. Will wean when tolerated.

## 2023-04-03 NOTE — PROGRESS NOTES
[facility] Medicine   INPATIENT PROGRESS NOTE      Patient Name: Jackson Acuña  Date of Admission: 3/22/2023  Today's Date: 04/03/23  Length of Stay: 12  Primary Care Physician: Brandon Darling MD    Subjective   Chief Complaint:   HPI   Overnight course reviewed, he was agitated overnight hence his ativan had to be adjusted. Mild temperature elevation and tylenol needed time to bring it down. Mild diaphoresis when seen, still intubated and sedated but needs secretion clearance.   Review of Systems   All pertinent negatives and positives are as above. All other systems have been reviewed and are negative unless otherwise stated.     Objective    Temp:  [99 °F (37.2 °C)-100.8 °F (38.2 °C)] 99.8 °F (37.7 °C)  Heart Rate:  [] 99  Resp:  [16-27] 23  BP: ()/(56-95) 124/72  FiO2 (%):  [30 %] 30 %   Assist control  Ppeak 22  Vt 550 ml  rate 16 /min  PEEP 5.0   Propofol 50 mcg/kg/min  ativan 4 ->3 -> 2 -> 3 mg/hr  TF @ 50 ml / hr  ETT  NG tube with bridle  bilateral wrist restrains  queen    Physical Exam  Laying in bed, sedated, intubated  Eyes closed  Mucosae moist and some drooling  Breathing stable  Heart rate borderline tachycardic. Normotensive  Soft, abdominal obesity  Rest as before    Results Review:  I have reviewed the labs, radiology results, and diagnostic studies.    Laboratory Data:   Results from last 7 days   Lab Units 04/03/23 0528 04/02/23 0525 03/31/23  0733   WBC 10*3/mm3 14.80* 10.30 8.69   HEMOGLOBIN g/dL 12.6* 12.1* 11.2*   HEMATOCRIT % 38.0 36.0* 34.3*   PLATELETS 10*3/mm3 340 407 357        Results from last 7 days   Lab Units 04/03/23 0528 04/02/23 0525 03/31/23  0734 03/29/23  0944   SODIUM mmol/L 142 139 141 139   POTASSIUM mmol/L 3.9 3.8 3.8 3.8   CHLORIDE mmol/L 106 105 106 104   CO2 mmol/L 23.0 21.0* 24.0 25.0   BUN mg/dL 14 12 12 8   CREATININE mg/dL 0.77 0.68* 0.71* 0.63*   CALCIUM mg/dL 9.4 9.2 8.9 8.6    BILIRUBIN mg/dL 0.2 0.2  --  0.2   ALK PHOS U/L 95 96  --  97   ALT (SGPT) U/L 28 26  --  25   AST (SGOT) U/L 31 22  --  23   GLUCOSE mg/dL 112* 119* 111* 115*       Culture Data:   Blood Culture   Date Value Ref Range Status   04/02/2023 Abnormal Stain (C)  Preliminary     No results found for: URINECX  No results found for: RESPCX  No results found for: WOUNDCX  No results found for: STOOLCX  No components found for: BODYFLD    Radiology Data:   Imaging Results (Last 24 Hours)     Procedure Component Value Units Date/Time    XR Chest 1 View [[id number]] Collected: 04/03/23 0442     Updated: 04/03/23 0519    Narrative:      EXAM: Single portable XR view of the chest  INDICATION: respiratory failure  COMPARISON: 4/2/2023 radiograph    FINDINGS:  Support lines and devices are stable in position.  The cardiomediastinal silhouette is stable.   Slightly increased pulmonary vascular congestive changes. No  large pleural effusion, visible pneumothorax, or focal  consolidation.      Impression:        Slightly increased pulmonary vascular congestive changes.    Electronically signed by:  Jose Maria Smith MD  4/3/2023 5:17 AM  CDT Workstation: [id number]    XR Chest 1 View [[id number]] Collected: 04/02/23 0912     Updated: 04/02/23 0935    Narrative:      EXAM: Chest, 1 AP view  CLINICAL INDICATION: Intubated, fever    COMPARISON: Chest radiographs 3/23/2023    FINDINGS:    There is Dobbhoff tube coursing through the esophagus.  There is endotracheal tube 4.9 cm above levi.    There are stable mild perihilar and basilar interstitial changes,  as well as possible slight blunting of left costophrenic angle.    There is no pneumothorax or interval cardiomegaly.    There is no acute or aggressive osseous lesion.      Impression:      CONCLUSION:     1. Stable appearing endotracheal tube position.    2. Stable mild bilateral interstitial changes and possible small  left-sided pleural effusion. See report for other  details.    Electronically signed by:  Kyrie Crabtree DO  4/2/2023 9:33 AM CDT  Workstation: [id number]      I have reviewed the patient's current medications.   Assessment/Plan     Active Hospital Problems    Diagnosis    • **Acute respiratory failure with hypoxia and hypercapnia Continue ventilation with antibiotics and supportive treatment, pulmonary toileting   • Fever Continue antibiotic for the staph pneumonia   • Staphylococcus aureus pneumonia On vancomycin and aztreonam as ordered   • Delirium tremens Continue propofol, phenobarb and benzos as ordered and adjust based on condition   • Anxiety disorder Continue ativan and phenobarb as ordered   • Affective disorder Psych on board and can adjust medicines once he is extubated and awake again   • smoker Continue nicotine replacement while here   • Transaminitis No new issues at this time   Stay in the ICU today, needs time to get extubated. Still critical  Electronically signed by Nikki Stallings MD, 04/03/23, 09:13 CDT.

## 2023-04-03 NOTE — NURSING NOTE
At 0010  Pt became very agitated, thrashing legs, pulling against restraints.  Administered 4mg ativan push per orders.     Pt continued to be agitated, pulling at restraints, trying to sit up.  Titrated Ativan gtt to 3 mg/hr    Pt continued to be agitated.  Called and updated Dr. Behroozi at 0040.  Requested PRN Fentanyl pushes to help with pain.   Dr. Behroozi placed Fentanyl 25mcg Q 2h PRN orders.  Fent 25mcg push given at 0057.

## 2023-04-04 LAB
BACTERIA SPEC AEROBE CULT: ABNORMAL
BACTERIA SPEC RESP CULT: ABNORMAL
BACTERIA SPEC RESP CULT: ABNORMAL
GLUCOSE BLDC GLUCOMTR-MCNC: 102 MG/DL (ref 70–130)
GLUCOSE BLDC GLUCOMTR-MCNC: 106 MG/DL (ref 70–130)
GLUCOSE BLDC GLUCOMTR-MCNC: 114 MG/DL (ref 70–130)
GLUCOSE BLDC GLUCOMTR-MCNC: 93 MG/DL (ref 70–130)
GRAM STN SPEC: ABNORMAL
ISOLATED FROM: ABNORMAL

## 2023-04-04 PROCEDURE — 94003 VENT MGMT INPAT SUBQ DAY: CPT

## 2023-04-04 PROCEDURE — 99232 SBSQ HOSP IP/OBS MODERATE 35: CPT | Performed by: PSYCHIATRY & NEUROLOGY

## 2023-04-04 PROCEDURE — 25010000002 CEFAZOLIN PER 500 MG: Performed by: HOSPITALIST

## 2023-04-04 PROCEDURE — 25010000002 PHENOBARBITAL PER 120 MG: Performed by: PSYCHIATRY & NEUROLOGY

## 2023-04-04 PROCEDURE — 25010000002 PROPOFOL 10 MG/ML EMULSION

## 2023-04-04 PROCEDURE — 25010000002 VANCOMYCIN 10 G RECONSTITUTED SOLUTION: Performed by: INTERNAL MEDICINE

## 2023-04-04 PROCEDURE — 25010000002 FENTANYL CITRATE (PF) 50 MCG/ML SOLUTION: Performed by: INTERNAL MEDICINE

## 2023-04-04 PROCEDURE — 94799 UNLISTED PULMONARY SVC/PX: CPT

## 2023-04-04 PROCEDURE — 82962 GLUCOSE BLOOD TEST: CPT

## 2023-04-04 PROCEDURE — 25010000002 LORAZEPAM PER 2 MG: Performed by: PSYCHIATRY & NEUROLOGY

## 2023-04-04 PROCEDURE — 94761 N-INVAS EAR/PLS OXIMETRY MLT: CPT

## 2023-04-04 PROCEDURE — 25010000002 ENOXAPARIN PER 10 MG

## 2023-04-04 RX ORDER — POLYETHYLENE GLYCOL 3350 17 G/17G
17 POWDER, FOR SOLUTION ORAL DAILY
Status: DISCONTINUED | OUTPATIENT
Start: 2023-04-05 | End: 2023-04-05 | Stop reason: HOSPADM

## 2023-04-04 RX ORDER — BUPIVACAINE HCL/0.9 % NACL/PF 0.1 %
2 PLASTIC BAG, INJECTION (ML) EPIDURAL EVERY 8 HOURS
Status: DISCONTINUED | OUTPATIENT
Start: 2023-04-04 | End: 2023-04-05 | Stop reason: HOSPADM

## 2023-04-04 RX ORDER — BISACODYL 5 MG/1
5 TABLET, DELAYED RELEASE ORAL DAILY PRN
Status: DISCONTINUED | OUTPATIENT
Start: 2023-04-04 | End: 2023-04-05 | Stop reason: HOSPADM

## 2023-04-04 RX ORDER — BISACODYL 10 MG
10 SUPPOSITORY, RECTAL RECTAL DAILY PRN
Status: DISCONTINUED | OUTPATIENT
Start: 2023-04-04 | End: 2023-04-05 | Stop reason: HOSPADM

## 2023-04-04 RX ORDER — AMOXICILLIN 250 MG
2 CAPSULE ORAL 2 TIMES DAILY
Status: DISCONTINUED | OUTPATIENT
Start: 2023-04-04 | End: 2023-04-05 | Stop reason: HOSPADM

## 2023-04-04 RX ADMIN — AZTREONAM 2 G: 2 INJECTION, POWDER, LYOPHILIZED, FOR SOLUTION INTRAMUSCULAR; INTRAVENOUS at 05:38

## 2023-04-04 RX ADMIN — PANTOPRAZOLE SODIUM 40 MG: 40 INJECTION, POWDER, FOR SOLUTION INTRAVENOUS at 05:38

## 2023-04-04 RX ADMIN — ATENOLOL 100 MG: 50 TABLET ORAL at 09:00

## 2023-04-04 RX ADMIN — PROPOFOL 50 MCG/KG/MIN: 10 INJECTION, EMULSION INTRAVENOUS at 01:31

## 2023-04-04 RX ADMIN — PHENOBARBITAL SODIUM 32.5 MG: 65 INJECTION INTRAMUSCULAR at 16:42

## 2023-04-04 RX ADMIN — PROPOFOL 50 MCG/KG/MIN: 10 INJECTION, EMULSION INTRAVENOUS at 12:54

## 2023-04-04 RX ADMIN — DOCUSATE SODIUM 50 MG AND SENNOSIDES 8.6 MG 2 TABLET: 8.6; 5 TABLET, FILM COATED ORAL at 09:00

## 2023-04-04 RX ADMIN — Medication 10 ML: at 20:28

## 2023-04-04 RX ADMIN — FENTANYL CITRATE 25 MCG: 50 INJECTION, SOLUTION INTRAMUSCULAR; INTRAVENOUS at 03:34

## 2023-04-04 RX ADMIN — ACETAMINOPHEN 650 MG: 325 TABLET ORAL at 20:51

## 2023-04-04 RX ADMIN — ENOXAPARIN SODIUM 40 MG: 40 INJECTION SUBCUTANEOUS at 09:00

## 2023-04-04 RX ADMIN — AZTREONAM 2 G: 2 INJECTION, POWDER, LYOPHILIZED, FOR SOLUTION INTRAMUSCULAR; INTRAVENOUS at 13:02

## 2023-04-04 RX ADMIN — PROPOFOL 50 MCG/KG/MIN: 10 INJECTION, EMULSION INTRAVENOUS at 16:28

## 2023-04-04 RX ADMIN — VANCOMYCIN HYDROCHLORIDE 1500 MG: 10 INJECTION, POWDER, LYOPHILIZED, FOR SOLUTION INTRAVENOUS at 10:56

## 2023-04-04 RX ADMIN — CEFAZOLIN 2 G: 10 INJECTION, POWDER, FOR SOLUTION INTRAVENOUS at 18:18

## 2023-04-04 RX ADMIN — DOCUSATE SODIUM 50 MG AND SENNOSIDES 8.6 MG 2 TABLET: 8.6; 5 TABLET, FILM COATED ORAL at 20:27

## 2023-04-04 RX ADMIN — CHLORHEXIDINE GLUCONATE 0.12% ORAL RINSE 15 ML: 1.2 LIQUID ORAL at 20:27

## 2023-04-04 RX ADMIN — PHENOBARBITAL SODIUM 65 MG: 65 INJECTION INTRAMUSCULAR at 09:01

## 2023-04-04 RX ADMIN — FENTANYL CITRATE 25 MCG: 50 INJECTION, SOLUTION INTRAMUSCULAR; INTRAVENOUS at 00:54

## 2023-04-04 RX ADMIN — DOCUSATE SODIUM 100 MG: 50 LIQUID ORAL at 09:00

## 2023-04-04 RX ADMIN — LORAZEPAM 2 MG/HR: 2 INJECTION INTRAMUSCULAR; INTRAVENOUS at 23:32

## 2023-04-04 RX ADMIN — NICOTINE 1 PATCH: 21 PATCH, EXTENDED RELEASE TRANSDERMAL at 04:09

## 2023-04-04 RX ADMIN — MAGNESIUM HYDROXIDE 10 ML: 2400 SUSPENSION ORAL at 15:57

## 2023-04-04 RX ADMIN — CHLORHEXIDINE GLUCONATE 0.12% ORAL RINSE 15 ML: 1.2 LIQUID ORAL at 09:00

## 2023-04-04 RX ADMIN — PROPOFOL 50 MCG/KG/MIN: 10 INJECTION, EMULSION INTRAVENOUS at 20:27

## 2023-04-04 RX ADMIN — PROPOFOL 50 MCG/KG/MIN: 10 INJECTION, EMULSION INTRAVENOUS at 04:54

## 2023-04-04 RX ADMIN — ACETAMINOPHEN 650 MG: 325 TABLET ORAL at 09:31

## 2023-04-04 RX ADMIN — Medication 10 ML: at 09:00

## 2023-04-04 NOTE — PLAN OF CARE
Goal Outcome Evaluation:  Plan of Care Reviewed With: patient        Progress: improving  Outcome Evaluation: VSS. Patient remains sedated and on ventilation.

## 2023-04-04 NOTE — PROGRESS NOTES
[facility] Medicine   INPATIENT PROGRESS NOTE      Patient Name: Jackson Acuña  Date of Admission: 3/22/2023  Today's Date: 04/04/23  Length of Stay: 13  Primary Care Physician: Brandon Darling MD    Subjective   Chief Complaint:   HPI   Overnight course reviewed, some agitation overnight hence his ativan had to be adjusted again. Mild temperature elevation and fever this morning, tylenol given. no diaphoresis when seen, still intubated and sedated. Has not had a decent BM despite laxatives and that's causing abdominal distension   Review of Systems   All pertinent negatives and positives are as above. All other systems have been reviewed and are negative unless otherwise stated.     Objective    Temp:  [98.9 °F (37.2 °C)-101.2 °F (38.4 °C)] 101.2 °F (38.4 °C)  Heart Rate:  [60-95] 95  Resp:  [16-26] 16  BP: ()/(51-95) 143/88  FiO2 (%):  [30 %] 30 %   Assist control  Ppeak 22  Vt 550 ml  rate 16 /min  PEEP 5.0   Propofol 50 mcg/kg/min  ativan 3 ->2 -> 2.5 mg/hr  TF @ 50 ml / hr  ETT  NG tube with bridle  bilateral wrist restrains  queen    Physical Exam  Laying in bed, sedated, intubated  Eyes closed  Mucosae moist  Breathing stable  Heart rate borderline tachycardic. Normotensive  Soft, abdominal obesity and distension    Results Review:  I have reviewed the labs, radiology results, and diagnostic studies.    Laboratory Data:   Results from last 7 days   Lab Units 04/03/23 0528 04/02/23 0525 03/31/23  0733   WBC 10*3/mm3 14.80* 10.30 8.69   HEMOGLOBIN g/dL 12.6* 12.1* 11.2*   HEMATOCRIT % 38.0 36.0* 34.3*   PLATELETS 10*3/mm3 340 407 357        Results from last 7 days   Lab Units 04/03/23 0528 04/02/23 0525 03/31/23  0734 03/29/23  0944   SODIUM mmol/L 142 139 141 139   POTASSIUM mmol/L 3.9 3.8 3.8 3.8   CHLORIDE mmol/L 106 105 106 104   CO2 mmol/L 23.0 21.0* 24.0 25.0   BUN mg/dL 14 12 12 8   CREATININE mg/dL 0.77 0.68* 0.71* 0.63*    CALCIUM mg/dL 9.4 9.2 8.9 8.6   BILIRUBIN mg/dL 0.2 0.2  --  0.2   ALK PHOS U/L 95 96  --  97   ALT (SGPT) U/L 28 26  --  25   AST (SGOT) U/L 31 22  --  23   GLUCOSE mg/dL 112* 119* 111* 115*       Culture Data:   Blood Culture   Date Value Ref Range Status   04/02/2023 No growth at 24 hours  Preliminary   04/02/2023 Staphylococcus, coagulase negative (C)  Final     No results found for: URINECX  Respiratory Culture   Date Value Ref Range Status   04/02/2023 Moderate growth (3+) Staphylococcus aureus (A)  Final   04/02/2023 Light growth (2+) Normal Respiratory [place]  Final     No results found for: WOUNDCX  No results found for: STOOLCX  No components found for: BODYFLD    Radiology Data:   Imaging Results (Last 24 Hours)     ** No results found for the last 24 hours. **      I have reviewed the patient's current medications.   Assessment/Plan     Active Hospital Problems    Diagnosis    • **Acute respiratory failure with hypoxia and hypercapnia Continue ventilation with antibiotics (though changes happened in the evening) and supportive treatment, pulmonary toileting   • Fever Continue antibiotic for the staph pneumonia, unsure why he continues to have fever despite being on appropriate regimen   • Staphylococcus aureus pneumonia Found out that this is MSSA and hence the antibiotics were switched to cefazolin after discussion with pharmacist. Check echo to look for IE   • Delirium tremens Continue propofol, phenobarb and benzos as ordered and adjust based on condition   • Anxiety disorder Continue ativan and phenobarb as ordered   • Affective disorder Psych on board and can adjust medicines once he is extubated and awake again   • smoker Continue nicotine replacement while here   • Transaminitis No new issues at this time   Stay in the ICU today, needs time to get extubated. Still critical  Electronically signed by Nikki Stallings MD, 04/04/23, 09:12 CDT.

## 2023-04-04 NOTE — PROGRESS NOTES
Psychiatry Progress Note    4/4/2023    Chief Complaint: delirium tremens    Subjective:  Patient is a 37 y.o. male who was hospitalized for delirium tremens.    Patient is intubated and unable to interact.    Ativan decreased to 2.5mg/hr yesterday and patient's vitals were stable overnight.  Patient is currently at phenobarb at 65mg q8hrs.    Objective     Vital Signs    Vitals:    04/04/23 1100 04/04/23 1200 04/04/23 1234 04/04/23 1300   BP: (!) 87/50 90/53  90/60   BP Location: Left arm Left arm  Left arm   Patient Position: Lying Lying  Lying   Pulse: 67 64 62 59   Resp: 22 16 16 16   Temp:  98.9 °F (37.2 °C)     TempSrc:  Oral     SpO2: 93% 95% 95% 96%   Weight:       Height:           Physical Exam: as of today, 04/04/23   General Appearance: intubated and sedated,  Hygiene:   fair  Gait & Station: in bed intubated and sedated  Musculoskeletal: No tremors or abnormal involuntary movements    Mental Status Exam: as of today, 04/04/23   Cooperation:  intubated and sedated  Eye Contact:  Closed  Psychomotor Behavior:  intubated and sedated  Mood: intubated and sedated and unable to answer  Affect:  asleep  Speech:  intubated and nonverbal  Thought Process:  intubated and nonverbal  Associations: intubated and nonverbal  Thought Content:     intubated and nonverbal   Suicidal:  None evidenced   Homicidal:  None evidenced   Hallucinations:  Not demonstrated today due to being intubated   Delusion:  Unable to demonstrate  Cognitive Functioning:   Consciousness: intubated and sedated  Reliability:  unable to assess due to being intubated  Insight:  unable to assess due to being intubated  Judgement:  unable to assess due to being intubated  Impulse Control:  unable to assess due to being intubated    Lab Results: Results source: EMR   Lab Results (last 24 hours)     Procedure Component Value Units Date/Time    POC Glucose Once [[id number]]  (Normal) Collected: 04/04/23 1055    Specimen: Blood Updated: 04/04/23 5426      Glucose 114 mg/dL      Comment: RN NotifiedOperator: [id number] CHANNING AMANDAMeter ID: [id number]       Blood Culture - Blood, Arm, Left [[id number]]  (Normal) Collected: 04/02/23 0920    Specimen: Blood from Arm, Left Updated: 04/04/23 0945     Blood Culture No growth at 2 days    Respiratory Culture - Sputum, ET Suction [[id number]]  (Abnormal) Collected: 04/03/23 0719    Specimen: Sputum from ET Suction Updated: 04/04/23 0929     Respiratory Culture Scant growth (1+) Staphylococcus aureus     Comment: Refer to Respiratory Culture from 4/2/23 for [facility]           Rare Normal Respiratory Sangita     Gram Stain Moderate (3+) WBCs per low power field      Rare (1+) Epithelial cells per low power field      Mixed bacterial sangita    Respiratory Culture - Sputum, ET Suction [[id number]]  (Abnormal)  (Susceptibility) Collected: 04/02/23 1047    Specimen: Sputum from ET Suction Updated: 04/04/23 0830     Respiratory Culture Moderate growth (3+) Staphylococcus aureus      Light growth (2+) Normal Respiratory Sangita     Gram Stain Many (4+) WBCs per low power field      Rare (1+) Epithelial cells per low power field      Mixed bacterial sangita    Susceptibility      Staphylococcus aureus      BOONE      Clindamycin Resistant     Inducible Clindamycin Resistance Positive      Oxacillin Susceptible      Tetracycline Susceptible      Trimethoprim + Sulfamethoxazole Susceptible      Vancomycin Susceptible                       Susceptibility Comments     Staphylococcus aureus    This isolate is presumed to be clindamycin resistant based on detection of inducible clindamycin resistance.  Clindamycin may still be effective in some patients.  This isolate is presumed to be clindamycin resistant based on detection of inducible clindamycin resistance.  Clindamycin may still be effective in some patients.  This isolate is presumed to be clindamycin resistant based on detection of inducible clindamycin resistance.  Clindamycin may still be  effective in some patients.             POC Glucose Once [[id number]]  (Normal) Collected: 04/03/23 2354    Specimen: Blood Updated: 04/04/23 0748     Glucose 106 mg/dL      Comment: : [id number] HADLEY AIMEEMeter ID: [id number]       Blood Culture - Blood, Arm, Right [[id number]]  (Abnormal) Collected: 04/02/23 0919    Specimen: Blood from Arm, Right Updated: 04/04/23 0631     Blood Culture Staphylococcus, coagulase negative     Isolated from Aerobic and Anaerobic Bottles     Gram Stain Aerobic Bottle Gram positive cocci in clusters      Anaerobic Bottle Gram positive cocci in clusters     Comment: 2 bottles of 4 drawn positive for gram positive cocci in cluesters       Narrative:      Probable contaminant requires clinical correlation, susceptibility not performed unless requested by physician.  1 of 4 bottles drawn positive for gram positive cocci    POC Glucose Once [[id number]]  (Normal) Collected: 04/04/23 0537    Specimen: Blood Updated: 04/04/23 0559     Glucose 102 mg/dL      Comment: : [id number] HADLEY AIMEEMeter ID: [id number]       POC Glucose Once [[id number]]  (Normal) Collected: 04/03/23 1652    Specimen: Blood Updated: 04/03/23 1856     Glucose 112 mg/dL      Comment: RN NotifiedOperator: [id number] CHANNING AMANDAMeter ID: [id number]             Radiology Results:  Imaging Results (Last 24 Hours)     ** No results found for the last 24 hours. **          Medicine:   Current Facility-Administered Medications:   •  [START ON 4/5/2023] !Vancomycin Level Draw Needed, , Does not apply, Once, Behroozi, Saeid, MD  •  acetaminophen (TYLENOL) tablet 650 mg, 650 mg, Oral, Q4H PRN, 650 mg at 04/04/23 0931 **OR** [DISCONTINUED] acetaminophen (TYLENOL) 160 MG/5ML solution 650 mg, 650 mg, Oral, Q4H PRN **OR** acetaminophen (TYLENOL) suppository 650 mg, 650 mg, Rectal, Q4H PRN, Harshal Jackson MD  •  atenolol (TENORMIN) tablet 100 mg, 100 mg, Oral, Daily, Harshal Jackson MD, 100 mg at  04/04/23 0900  •  aztreonam (AZACTAM) 2 g/100 mL 0.9% NS (mbp), 2 g, Intravenous, Q8H, Behroozi, Saeid, MD, 2 g at 04/04/23 1302  •  sennosides-docusate (PERICOLACE) 8.6-50 MG per tablet 2 tablet, 2 tablet, Oral, BID **AND** [START ON 4/5/2023] polyethylene glycol (MIRALAX) packet 17 g, 17 g, Oral, Daily **AND** bisacodyl (DULCOLAX) EC tablet 5 mg, 5 mg, Oral, Daily PRN **AND** bisacodyl (DULCOLAX) suppository 10 mg, 10 mg, Rectal, Daily PRN, Nikki Stallings MD  •  chlorhexidine (PERIDEX) 0.12 % solution 15 mL, 15 mL, Mouth/Throat, Q12H, Bertrand Conteh MD, 15 mL at 04/04/23 0900  •  docusate sodium (COLACE) liquid 100 mg, 100 mg, Oral, Daily, William Shook MD, 100 mg at 04/04/23 0900  •  Enoxaparin Sodium (LOVENOX) syringe 40 mg, 40 mg, Subcutaneous, Daily, Harshal Jackson MD, 40 mg at 04/04/23 0900  •  fentaNYL citrate (PF) (SUBLIMAZE) injection 25 mcg, 25 mcg, Intravenous, Q2H PRN, Behroozi, Saeid, MD, 25 mcg at 04/04/23 0334  •  hydrALAZINE (APRESOLINE) injection 10 mg, 10 mg, Intravenous, Q6H PRN, William Shook MD, 10 mg at 03/27/23 0747  •  lactated ringers infusion, 75 mL/hr, Intravenous, Continuous, Jan Saavedra MD, Last Rate: 75 mL/hr at 04/03/23 1718, 75 mL/hr at 04/03/23 1718  •  LORazepam (ATIVAN) 100 mg in sodium chloride 0.9 % 100 mL infusion, 0.5-10 mg/hr, Intravenous, Titrated, Omkar Hope II, MD, Last Rate: 2.5 mL/hr at 04/03/23 1501, 2.5 mg/hr at 04/03/23 1501  •  LORazepam (ATIVAN) tablet 1 mg, 1 mg, Oral, Q2H PRN **OR** LORazepam (ATIVAN) injection 1 mg, 1 mg, Intravenous, Q2H PRN **OR** LORazepam (ATIVAN) tablet 2 mg, 2 mg, Oral, Q1H PRN **OR** LORazepam (ATIVAN) injection 2 mg, 2 mg, Intravenous, Q1H PRN, 2 mg at 04/03/23 2314 **OR** LORazepam (ATIVAN) injection 2 mg, 2 mg, Intravenous, Q15 Min PRN **OR** LORazepam (ATIVAN) injection 2 mg, 2 mg, Intramuscular, Q15 Min PRN, Omkar Hope II, MD  •  magnesium hydroxide (MILK OF MAGNESIA) suspension 10 mL,  10 mL, Oral, Daily, Nikki Stallings MD  •  Magnesium Sulfate - Total Dose 10 grams - Magnesium 1 or Less, 2 g, Intravenous, PRN **OR** Magnesium Sulfate - Total Dose 6 grams - Magnesium 1.1 - 1.5, 2 g, Intravenous, PRN **OR** Magnesium Sulfate - Total Dose 4 grams - Magnesium 1.6 - 1.9, 4 g, Intravenous, PRN, Rhona Hinds S, APRN, Last Rate: 25 mL/hr at 23 1226, 4 g at 23 1226  •  [] morphine injection 2 mg, 2 mg, Intravenous, Q4H PRN, 2 mg at 23 0843 **AND** naloxone (NARCAN) injection 0.4 mg, 0.4 mg, Intravenous, Q5 Min PRN, Harshal Jackson MD  •  nicotine (NICODERM CQ) 21 MG/24HR patch 1 patch, 1 patch, Transdermal, Q24H, Harshal Jackson MD, 1 patch at 23 0409  •  ondansetron (ZOFRAN) tablet 4 mg, 4 mg, Oral, Q6H PRN **OR** ondansetron (ZOFRAN) injection 4 mg, 4 mg, Intravenous, Q6H PRN, Harshal Jackson MD  •  pantoprazole (PROTONIX) injection 40 mg, 40 mg, Intravenous, Q AM, Bertrand Conteh MD, 40 mg at 23 0538  •  Pharmacy to dose vancomycin, , Does not apply, Continuous PRN, Behroozi, Saeid, MD  •  PHENobarbital 65 mg in sodium chloride 0.9 % 100 mL IVPB, 65 mg, Intravenous, Q8H, Omkar Hope II, MD, 65 mg at 23 0901  •  potassium chloride (MICRO-K) CR capsule 40 mEq, 40 mEq, Oral, PRN, 40 mEq at 23 1859 **OR** potassium chloride (KLOR-CON) packet 40 mEq, 40 mEq, Oral, PRN, 40 mEq at 23 0816 **OR** potassium chloride 10 mEq in 100 mL IVPB, 10 mEq, Intravenous, Q1H PRN, Rhona Hinds, KINGSLEY  •  prochlorperazine (COMPAZINE) injection 2.5 mg, 2.5 mg, Intravenous, Q6H PRN, Harshal Jackson MD, 2.5 mg at 23 0317  •  propofol (DIPRIVAN) infusion 10 mg/mL 100 mL, 5-50 mcg/kg/min, Intravenous, Titrated, Harshal Jackson MD, Last Rate: 25.6 mL/hr at 23 1254, 50 mcg/kg/min at 23 1254  •  sodium chloride 0.9 % flush 10 mL, 10 mL, Intravenous, Q12H, Harshal Jackson MD, 10 mL at 23 0900  •   sodium chloride 0.9 % flush 10 mL, 10 mL, Intravenous, PRN, Harshal Jackson MD  •  sodium chloride 0.9 % infusion 40 mL, 40 mL, Intravenous, PRN, Harshal Jackson MD  •  vancomycin 1500 mg/500 mL 0.9% NS IVPB ([facility]), 1,500 mg, Intravenous, Q12H, Behroozi, Saeid, MD, 1,500 mg at 04/04/23 1056    Diagnoses/Assessment:     Acute respiratory failure with hypoxia and hypercapnia    Alcohol withdrawal    Transaminitis    Delirium tremens    Anxiety disorder    Affective disorder    Fever    Staphylococcus aureus pneumonia      Treatment Plan:    Alcohol Withdrawal/Delirium Tremens:              --Decrease Phenobart to 32.5mg q8hrs.  Plan to gradually taper              --Cont CIIWA and PRN ativan              --Decrease Ativan drip to 2mg/hr.  Continue at this dose over night.              --Cont ativan 4mg q1hrs PRN for agitation and autonomic instability.      Anxiety & Affective Disorder:              --Will consider psychotherapeutic agents after extubation and management of the detox.    HTN:    --Continued atenolol 100mg daily.   --Treat BP elevation with PRN ativan for withdrawal.    Tanya Perez MD  04/04/23 at 15:15 CDT

## 2023-04-04 NOTE — PLAN OF CARE
Goal Outcome Evaluation:  Plan of Care Reviewed With: patient        Progress: improving  Outcome Evaluation: VSS. Patient remains sedated and on ventilation. Propofol and Ativan infusing. Adequate urine output. NSR.

## 2023-04-04 NOTE — PLAN OF CARE
Goal Outcome Evaluation:               Pt remains on vent, intubated. Thiamine discontinued. Last BM recorded 4/1 (smear); bowel regimen in place for today. #. Cortrak placed 3/24 -Peptamen AF at goal rate 50ml/hr and free water flush (+600ml) to provide 1440cal (+676cal propofol) = 2116cal, 91g pro and 1572ml (+600ml) = 1572ml/day. RD to follow hospital course.

## 2023-04-04 NOTE — PLAN OF CARE
Patient unable to wean at this time.  We will continue to monitor and wean vent as tolerated.  Problem: Communication Impairment (Mechanical Ventilation, Invasive)  Goal: Effective Communication  Outcome: Ongoing, Progressing     Problem: Device-Related Complication Risk (Mechanical Ventilation, Invasive)  Goal: Optimal Device Function  Outcome: Ongoing, Progressing  Intervention: Optimize Device Care and Function  Flowsheets  Taken 4/4/2023 1200 by Shannon Coronado RN  Airway Safety Measures:   manual resuscitator/mask at bedside   suction at bedside  Taken 4/4/2023 0631 by Selina Estrada, RRT  Airway Safety Measures:   manual resuscitator/mask at bedside   oxygen flowmeter at bedside   suction at bedside     Problem: Inability to Wean (Mechanical Ventilation, Invasive)  Goal: Mechanical Ventilation Liberation  Outcome: Ongoing, Progressing     Problem: Nutrition Impairment (Mechanical Ventilation, Invasive)  Goal: Optimal Nutrition Delivery  Outcome: Ongoing, Progressing     Problem: Skin and Tissue Injury (Mechanical Ventilation, Invasive)  Goal: Absence of Device-Related Skin and Tissue Injury  Outcome: Ongoing, Progressing     Problem: Ventilator-Induced Lung Injury (Mechanical Ventilation, Invasive)  Goal: Absence of Ventilator-Induced Lung Injury  Outcome: Ongoing, Progressing  Intervention: Prevent Ventilator-Associated Pneumonia  Flowsheets  Taken 4/4/2023 1600 by Senia Hopkins, RN  Head of Bed (HOB) Positioning: HOB at 30 degrees  Taken 4/4/2023 0631 by Selina Estrada RRT  Head of Bed (HOB) Positioning: HOB at 30-45 degrees  Intervention: Facilitate Lung-Protection Measures  Flowsheets (Taken 4/4/2023 1656)  Lung Protection Measures:   fluid excess minimized   low inspiratory pressure provided   low tidal volume provided   lung compliance monitored   ventilator synchrony promoted   plateau/inspiratory pressure monitored   optimal PEEP applied   ventilator waveforms monitored     Problem: Acute  Neurologic Deterioration (Alcohol Withdrawal)  Goal: Optimal Neurologic Function  Intervention: Minimize or Manage Acute Neurologic Symptoms  Recent Flowsheet Documentation  Taken 4/4/2023 0631 by Selina Estrada RRT  Airway/Ventilation Management:   airway patency maintained   calming measures promoted   pulmonary hygiene promoted   humidification applied     Problem: Skin Injury Risk Increased  Goal: Skin Health and Integrity  Intervention: Optimize Skin Protection  Recent Flowsheet Documentation  Taken 4/4/2023 0631 by Selina Estrada RRT  Head of Bed (HOB) Positioning: HOB at 30-45 degrees     Problem: Device-Related Complication Risk (Mechanical Ventilation, Invasive)  Goal: Optimal Device Function  Intervention: Optimize Device Care and Function  Recent Flowsheet Documentation  Taken 4/4/2023 0631 by Selina Estrada RRT  Airway Safety Measures:   manual resuscitator/mask at bedside   oxygen flowmeter at bedside   suction at bedside  Goal: Optimal Device Function  Intervention: Optimize Device Care and Function  Recent Flowsheet Documentation  Taken 4/4/2023 0631 by Selina Estrada RRT  Airway Safety Measures:   manual resuscitator/mask at bedside   oxygen flowmeter at bedside   suction at bedside  Goal: Optimal Device Function  Intervention: Optimize Device Care and Function  Recent Flowsheet Documentation  Taken 4/4/2023 0631 by Selina Estrada RRT  Airway Safety Measures:   manual resuscitator/mask at bedside   oxygen flowmeter at bedside   suction at bedside  Goal: Optimal Device Function  Intervention: Optimize Device Care and Function  Recent Flowsheet Documentation  Taken 4/4/2023 0631 by Selina Estrada RRT  Airway Safety Measures:   manual resuscitator/mask at bedside   oxygen flowmeter at bedside   suction at bedside     Problem: Inability to Wean (Mechanical Ventilation, Invasive)  Goal: Mechanical Ventilation Liberation  Intervention: Promote Extubation and Mechanical Ventilation  Liberation  Flowsheets (Taken 4/4/2023 1600 by Senia Hopkins, RN)  Environmental Support: calm environment promoted     Problem: Ventilator-Induced Lung Injury (Mechanical Ventilation, Invasive)  Goal: Absence of Ventilator-Induced Lung Injury  Intervention: Prevent Ventilator-Associated Pneumonia  Recent Flowsheet Documentation  Taken 4/4/2023 0631 by Selina Estrada RRT  Head of Bed (HOB) Positioning: HOB at 30-45 degrees  Intervention: Facilitate Lung-Protection Measures  Recent Flowsheet Documentation  Taken 4/4/2023 1656 by Selina Estrada RRT  Lung Protection Measures:   fluid excess minimized   low inspiratory pressure provided   low tidal volume provided   lung compliance monitored   ventilator synchrony promoted   plateau/inspiratory pressure monitored   optimal PEEP applied   ventilator waveforms monitored  Goal: Absence of Ventilator-Induced Lung Injury  Intervention: Prevent Ventilator-Associated Pneumonia  Recent Flowsheet Documentation  Taken 4/4/2023 0631 by Selina Estrada RRT  Head of Bed (HOB) Positioning: HOB at 30-45 degrees  Intervention: Facilitate Lung-Protection Measures  Recent Flowsheet Documentation  Taken 4/4/2023 1656 by Selina Estrada RRT  Lung Protection Measures:   fluid excess minimized   low inspiratory pressure provided   low tidal volume provided   lung compliance monitored   ventilator synchrony promoted   plateau/inspiratory pressure monitored   optimal PEEP applied   ventilator waveforms monitored  Goal: Absence of Ventilator-Induced Lung Injury  Intervention: Prevent Ventilator-Associated Pneumonia  Recent Flowsheet Documentation  Taken 4/4/2023 0631 by Selina Estrada RRT  Head of Bed (HOB) Positioning: HOB at 30-45 degrees  Intervention: Facilitate Lung-Protection Measures  Recent Flowsheet Documentation  Taken 4/4/2023 1656 by Selina Estrada RRT  Lung Protection Measures:   fluid excess minimized   low inspiratory pressure provided   low tidal volume  provided   lung compliance monitored   ventilator synchrony promoted   plateau/inspiratory pressure monitored   optimal PEEP applied   ventilator waveforms monitored  Goal: Absence of Ventilator-Induced Lung Injury  Intervention: Prevent Ventilator-Associated Pneumonia  Recent Flowsheet Documentation  Taken 4/4/2023 0631 by Selina Estrada, RRT  Head of Bed (HOB) Positioning: HOB at 30-45 degrees  Intervention: Facilitate Lung-Protection Measures  Recent Flowsheet Documentation  Taken 4/4/2023 1656 by Selina Estrada, RRT  Lung Protection Measures:   fluid excess minimized   low inspiratory pressure provided   low tidal volume provided   lung compliance monitored   ventilator synchrony promoted   plateau/inspiratory pressure monitored   optimal PEEP applied   ventilator waveforms monitored   Goal Outcome Evaluation:

## 2023-04-04 NOTE — PLAN OF CARE
Goal Outcome Evaluation:  Plan of Care Reviewed With: patient        Progress: no change  Outcome Evaluation: VSS, pt condition remains stable.  Pt secretions are copious this shift, pt requires suction frequently, every hour to 2 hours.  Pt repositioned frequently.  No BM this shift.

## 2023-04-04 NOTE — PROGRESS NOTES
Adult Nutrition  Assessment/PES    Patient Name:  Jackson Acuña  YOB: 1985  MRN: [id number]  Admit Date:  3/22/2023    Assessment Date:  4/4/2023    Comments:  Pt remains on vent, intubated 3/23. 3/22/23 188#- 3/27 181# and 3/30 198#, #w/ noted 2+ edema hands, arms, and abdomen area. Reported -205# on admit. IV thiamine discontinued. Spoke to nursing re restarting thiamine. Phenobarb and CIWA in place- propofol at 25.6 (+676cal). Noted last BM recorded 4/3, RN states it was only a smear and pt has significant abdominal distension. Last BM prior to that was 3/28. RN states plans to start bowel regimen today. Cortrak placed 3/24 -Peptamen AF at goal rate 50ml/hr and free water flush (+600ml) to provide 1440cal (+676cal propofol) = 2116cal, 91g pro and 1572ml (+600ml) = 1572ml/day. RD to follow hospital course.     Reason for Assessment     Row Name 04/04/23 1035          Reason for Assessment    Reason For Assessment follow-up protocol (P)      Diagnosis substance use/abuse;pulmonary disease (P)      Identified At Risk by Screening Criteria tube feeding or parenteral nutrition (P)                 Nutrition/Diet History     Row Name 04/04/23 1035          Nutrition/Diet History    Typical Intake (Food/Fluid/EN/PN) RN notes no problems noted w/ TF. Last BM on 4/3 only a smear. Bowel regimen in progress. (P)                 Labs/Tests/Procedures/Meds     Row Name 04/04/23 1036          Labs/Procedures/Meds    Lab Results Reviewed reviewed, pertinent (P)      Lab Results Comments WNL (P)         Medications    Pertinent Medications Reviewed reviewed, pertinent (P)      Pertinent Medications Comments LR 75 ml/hr, CIWA, phenobarbital, ativan drip, propofol, vancomycin (P)                   Estimated/Assessed Needs - Anthropometrics     Row Name 04/04/23 0500          Anthropometrics    Weight 93.4 kg (205 lb 14.6 oz)                Nutrition Prescription Ordered     Row Name 04/04/23 1040           Nutrition Prescription PO    Current PO Diet NPO (P)         Nutrition Prescription EN    Enteral Route ND (P)      Product Peptamen AF (Vital AF 1.2) (P)      TF Delivery Method Continuous (P)      Continuous TF Goal Rate (mL/hr) 50 mL/hr (P)      Water flush (mL)  25 mL (P)      Water Flush Frequency Per hour (P)                 Evaluation of Received Nutrient/Fluid Intake     Row Name 04/04/23 1040          Calories Evaluation    Total Calorie Target (kcal) 2150 (P)      Oral Calories (kcal) 0 (P)      Enteral Calories (kcal) 1440 (P)      Parenteral Calories (kcal) 0 (P)      Other Calories (kcal) 676 (P)   propofol     Total Calories (kcal) 2116 (P)      % of Kcal Needs 98.42 (P)         Protein Evaluation    Total Protein Target (g) 85 (P)      Oral Protein (g) 0 (P)      Enteral Protein (g) 91 (P)      Parenteral Protein (g) 0 (P)      Other Protein (g) 0 (P)      Total Protein (g) 91 (P)      % of Protein Needs 107.06 (P)         Intake Assessment    Energy/Calorie Requirement Assessment meeting needs (P)      Protein Requirement Assessment meeting needs (P)                    Problem/Interventions:     Problem 2     Row Name 04/04/23 1043          Nutrition Diagnoses Problem 2    Problem 2 Needs Alternate Route (P)      Etiology (related to) Medical Diagnosis (P)      Pulmonary/Critical Care Ventilator (P)      Substance Use ETOH;Drug/illicit/recreational (P)      Signs/Symptoms (evidenced by) NPO (P)                     Intervention Goal     Row Name 04/04/23 1043          Intervention Goal    General Maintain nutrition;Nutrition support treatment;Reduce/improve symptoms;Meet nutritional needs for age/condition (P)      TF/PN Maintain TF/PN (P)      Weight Maintain weight (P)                 Nutrition Intervention     Row Name 04/04/23 1043          Nutrition Intervention    RD/Tech Action Follow Tx progress;Care plan reviewd (P)                 Nutrition Prescription     Row Name 04/04/23 1046           Nutrition Prescription PO    PO Prescription Begin/change diet (P)      Begin/Change Diet to NPO (P)                 Education/Evaluation     Row Name 04/04/23 1044          Education    Education Education not appropriate at this time (P)      Please explain Patient sedation status;Patient intubated (P)         Monitor/Evaluation    Monitor Per protocol;TF delivery/tolerance;Weight;Skin status;Symptoms (P)      Education Follow-up Reinforce PRN (P)                  Electronically signed by:  Meredith Sanchez  04/04/23 10:44 CDT

## 2023-04-05 ENCOUNTER — APPOINTMENT (OUTPATIENT)
Dept: GENERAL RADIOLOGY | Facility: HOSPITAL | Age: 38
DRG: 896 | End: 2023-04-05
Payer: COMMERCIAL

## 2023-04-05 ENCOUNTER — OUTSIDE FACILITY SERVICE (OUTPATIENT)
Dept: PULMONOLOGY | Facility: CLINIC | Age: 38
End: 2023-04-05
Payer: COMMERCIAL

## 2023-04-05 ENCOUNTER — APPOINTMENT (OUTPATIENT)
Dept: GENERAL RADIOLOGY | Facility: HOSPITAL | Age: 38
End: 2023-04-05
Payer: COMMERCIAL

## 2023-04-05 ENCOUNTER — HOSPITAL ENCOUNTER (OUTPATIENT)
Facility: HOSPITAL | Age: 38
Discharge: HOME | End: 2023-04-13
Attending: INTERNAL MEDICINE | Admitting: INTERNAL MEDICINE
Payer: COMMERCIAL

## 2023-04-05 VITALS
BODY MASS INDEX: 26.76 KG/M2 | SYSTOLIC BLOOD PRESSURE: 91 MMHG | RESPIRATION RATE: 17 BRPM | WEIGHT: 197.53 LBS | DIASTOLIC BLOOD PRESSURE: 52 MMHG | OXYGEN SATURATION: 97 % | HEIGHT: 72 IN | HEART RATE: 67 BPM | TEMPERATURE: 98.6 F

## 2023-04-05 DIAGNOSIS — R13.10 DYSPHAGIA, UNSPECIFIED TYPE: ICD-10-CM

## 2023-04-05 DIAGNOSIS — Z78.9 IMPAIRED MOBILITY AND ADLS: ICD-10-CM

## 2023-04-05 DIAGNOSIS — Z74.09 IMPAIRED MOBILITY AND ADLS: ICD-10-CM

## 2023-04-05 DIAGNOSIS — R26.89 DECREASED FUNCTIONAL MOBILITY: ICD-10-CM

## 2023-04-05 LAB
ALBUMIN SERPL-MCNC: 3.9 G/DL (ref 3.5–5.2)
ALBUMIN/GLOB SERPL: 1.3 G/DL
ALP SERPL-CCNC: 86 U/L (ref 39–117)
ALT SERPL W P-5'-P-CCNC: 22 U/L (ref 1–41)
ANION GAP SERPL CALCULATED.3IONS-SCNC: 12 MMOL/L (ref 5–15)
ARTERIAL PATENCY WRIST A: POSITIVE
AST SERPL-CCNC: 18 U/L (ref 1–40)
ATMOSPHERIC PRESS: 749 MMHG
BACTERIA SPEC RESP CULT: ABNORMAL
BACTERIA SPEC RESP CULT: ABNORMAL
BASE EXCESS BLDA CALC-SCNC: 2.1 MMOL/L (ref 0–2)
BASOPHILS # BLD AUTO: 0.12 10*3/MM3 (ref 0–0.2)
BASOPHILS NFR BLD AUTO: 1 % (ref 0–1.5)
BDY SITE: ABNORMAL
BILIRUB SERPL-MCNC: 0.2 MG/DL (ref 0–1.2)
BUN SERPL-MCNC: 12 MG/DL (ref 6–20)
BUN/CREAT SERPL: 20.7 (ref 7–25)
CALCIUM SPEC-SCNC: 9.4 MG/DL (ref 8.6–10.5)
CHLORIDE SERPL-SCNC: 104 MMOL/L (ref 98–107)
CO2 SERPL-SCNC: 24 MMOL/L (ref 22–29)
CREAT SERPL-MCNC: 0.58 MG/DL (ref 0.76–1.27)
CRP SERPL-MCNC: 2.51 MG/DL (ref 0–0.5)
DEPRECATED RDW RBC AUTO: 52.9 FL (ref 37–54)
EGFRCR SERPLBLD CKD-EPI 2021: 129.6 ML/MIN/1.73
EOSINOPHIL # BLD AUTO: 0.26 10*3/MM3 (ref 0–0.4)
EOSINOPHIL NFR BLD AUTO: 2.1 % (ref 0.3–6.2)
ERYTHROCYTE [DISTWIDTH] IN BLOOD BY AUTOMATED COUNT: 14.6 % (ref 12.3–15.4)
GLOBULIN UR ELPH-MCNC: 3.1 GM/DL
GLUCOSE BLDC GLUCOMTR-MCNC: 104 MG/DL (ref 70–130)
GLUCOSE BLDC GLUCOMTR-MCNC: 91 MG/DL (ref 70–130)
GLUCOSE BLDC GLUCOMTR-MCNC: 92 MG/DL (ref 70–130)
GLUCOSE BLDC GLUCOMTR-MCNC: 95 MG/DL (ref 70–130)
GLUCOSE BLDC GLUCOMTR-MCNC: 98 MG/DL (ref 70–130)
GLUCOSE SERPL-MCNC: 100 MG/DL (ref 65–99)
GRAM STN SPEC: ABNORMAL
HCO3 BLDA-SCNC: 25.6 MMOL/L (ref 20–26)
HCT VFR BLD AUTO: 35.3 % (ref 37.5–51)
HGB BLD-MCNC: 12.1 G/DL (ref 13–17.7)
IMM GRANULOCYTES # BLD AUTO: 0.47 10*3/MM3 (ref 0–0.05)
IMM GRANULOCYTES NFR BLD AUTO: 3.9 % (ref 0–0.5)
INHALED O2 CONCENTRATION: 30 %
LYMPHOCYTES # BLD AUTO: 1.75 10*3/MM3 (ref 0.7–3.1)
LYMPHOCYTES NFR BLD AUTO: 14.4 % (ref 19.6–45.3)
Lab: ABNORMAL
MCH RBC QN AUTO: 33.9 PG (ref 26.6–33)
MCHC RBC AUTO-ENTMCNC: 34.3 G/DL (ref 31.5–35.7)
MCV RBC AUTO: 98.9 FL (ref 79–97)
MODALITY: ABNORMAL
MONOCYTES # BLD AUTO: 1.11 10*3/MM3 (ref 0.1–0.9)
MONOCYTES NFR BLD AUTO: 9.1 % (ref 5–12)
NEUTROPHILS NFR BLD AUTO: 69.5 % (ref 42.7–76)
NEUTROPHILS NFR BLD AUTO: 8.45 10*3/MM3 (ref 1.7–7)
NRBC BLD AUTO-RTO: 0 /100 WBC (ref 0–0.2)
PCO2 BLDA: 35.1 MM HG (ref 35–45)
PEEP RESPIRATORY: 5 CM[H2O]
PH BLDA: 7.47 PH UNITS (ref 7.35–7.45)
PLATELET # BLD AUTO: 167 10*3/MM3 (ref 140–450)
PMV BLD AUTO: 10.4 FL (ref 6–12)
PO2 BLDA: 67.2 MM HG (ref 83–108)
POTASSIUM SERPL-SCNC: 3.6 MMOL/L (ref 3.5–5.2)
PROT SERPL-MCNC: 7 G/DL (ref 6–8.5)
PSV: 8 CMH2O
RBC # BLD AUTO: 3.57 10*6/MM3 (ref 4.14–5.8)
SAO2 % BLDCOA: 94.9 % (ref 94–99)
SET MECH RESP RATE: 8
SODIUM SERPL-SCNC: 140 MMOL/L (ref 136–145)
VENTILATOR MODE: ABNORMAL
VT ON VENT VENT: 550 ML
WBC NRBC COR # BLD: 12.16 10*3/MM3 (ref 3.4–10.8)

## 2023-04-05 PROCEDURE — 86140 C-REACTIVE PROTEIN: CPT | Performed by: HOSPITALIST

## 2023-04-05 PROCEDURE — 94799 UNLISTED PULMONARY SVC/PX: CPT

## 2023-04-05 PROCEDURE — 74018 RADEX ABDOMEN 1 VIEW: CPT

## 2023-04-05 PROCEDURE — 82962 GLUCOSE BLOOD TEST: CPT

## 2023-04-05 PROCEDURE — 71045 X-RAY EXAM CHEST 1 VIEW: CPT

## 2023-04-05 PROCEDURE — 25010000002 CEFAZOLIN PER 500 MG: Performed by: INTERNAL MEDICINE

## 2023-04-05 PROCEDURE — 25010000002 PHENOBARBITAL PER 120 MG: Performed by: INTERNAL MEDICINE

## 2023-04-05 PROCEDURE — 94003 VENT MGMT INPAT SUBQ DAY: CPT

## 2023-04-05 PROCEDURE — 87147 CULTURE TYPE IMMUNOLOGIC: CPT | Performed by: INTERNAL MEDICINE

## 2023-04-05 PROCEDURE — 25010000002 FENTANYL CITRATE (PF) 50 MCG/ML SOLUTION: Performed by: INTERNAL MEDICINE

## 2023-04-05 PROCEDURE — 25010000002 THIAMINE PER 100 MG

## 2023-04-05 PROCEDURE — 25010000002 PHENOBARBITAL PER 120 MG: Performed by: PSYCHIATRY & NEUROLOGY

## 2023-04-05 PROCEDURE — 25010000002 PROPOFOL 10 MG/ML EMULSION: Performed by: HOSPITALIST

## 2023-04-05 PROCEDURE — 25010000002 LORAZEPAM PER 2 MG: Performed by: INTERNAL MEDICINE

## 2023-04-05 PROCEDURE — 25010000002 PROPOFOL 10 MG/ML EMULSION: Performed by: INTERNAL MEDICINE

## 2023-04-05 PROCEDURE — 87070 CULTURE OTHR SPECIMN AEROBIC: CPT | Performed by: INTERNAL MEDICINE

## 2023-04-05 PROCEDURE — 82803 BLOOD GASES ANY COMBINATION: CPT

## 2023-04-05 PROCEDURE — 87205 SMEAR GRAM STAIN: CPT | Performed by: INTERNAL MEDICINE

## 2023-04-05 PROCEDURE — 85025 COMPLETE CBC W/AUTO DIFF WBC: CPT | Performed by: HOSPITALIST

## 2023-04-05 PROCEDURE — 94761 N-INVAS EAR/PLS OXIMETRY MLT: CPT

## 2023-04-05 PROCEDURE — 87186 SC STD MICRODIL/AGAR DIL: CPT | Performed by: INTERNAL MEDICINE

## 2023-04-05 PROCEDURE — 25010000002 ENOXAPARIN PER 10 MG

## 2023-04-05 PROCEDURE — 80053 COMPREHEN METABOLIC PANEL: CPT | Performed by: HOSPITALIST

## 2023-04-05 PROCEDURE — 25010000002 CEFAZOLIN PER 500 MG: Performed by: HOSPITALIST

## 2023-04-05 PROCEDURE — 25010000002 PROPOFOL 10 MG/ML EMULSION

## 2023-04-05 RX ORDER — POLYETHYLENE GLYCOL 3350 17 G/17G
17 POWDER, FOR SOLUTION ORAL DAILY
Start: 2023-04-06

## 2023-04-05 RX ORDER — CHLORDIAZEPOXIDE HYDROCHLORIDE 25 MG/1
50 CAPSULE, GELATIN COATED ORAL EVERY 8 HOURS SCHEDULED
Status: DISCONTINUED | OUTPATIENT
Start: 2023-04-05 | End: 2023-04-05 | Stop reason: SDUPTHER

## 2023-04-05 RX ORDER — ENOXAPARIN SODIUM 100 MG/ML
40 INJECTION SUBCUTANEOUS DAILY
Status: DISCONTINUED | OUTPATIENT
Start: 2023-04-06 | End: 2023-04-11

## 2023-04-05 RX ORDER — HYDRALAZINE HYDROCHLORIDE 20 MG/ML
10 INJECTION INTRAMUSCULAR; INTRAVENOUS EVERY 4 HOURS PRN
Status: DISCONTINUED | OUTPATIENT
Start: 2023-04-05 | End: 2023-04-13 | Stop reason: HOSPADM

## 2023-04-05 RX ORDER — SODIUM CHLORIDE 0.9 % (FLUSH) 0.9 %
3 SYRINGE (ML) INJECTION EVERY 12 HOURS
Status: DISCONTINUED | OUTPATIENT
Start: 2023-04-05 | End: 2023-04-13 | Stop reason: HOSPADM

## 2023-04-05 RX ORDER — AMOXICILLIN 250 MG
2 CAPSULE ORAL 2 TIMES DAILY
Status: DISCONTINUED | OUTPATIENT
Start: 2023-04-05 | End: 2023-04-11

## 2023-04-05 RX ORDER — LORAZEPAM 2 MG/ML
2 INJECTION INTRAMUSCULAR
Status: DISCONTINUED | OUTPATIENT
Start: 2023-04-05 | End: 2023-04-11

## 2023-04-05 RX ORDER — NICOTINE 21 MG/24HR
1 PATCH, TRANSDERMAL 24 HOURS TRANSDERMAL EVERY 24 HOURS
Start: 2023-04-06

## 2023-04-05 RX ORDER — DOCUSATE SODIUM 50 MG/5 ML
100 LIQUID (ML) ORAL DAILY
Status: DISCONTINUED | OUTPATIENT
Start: 2023-04-06 | End: 2023-04-11

## 2023-04-05 RX ORDER — AMOXICILLIN 250 MG
2 CAPSULE ORAL 2 TIMES DAILY
Start: 2023-04-05

## 2023-04-05 RX ORDER — NICOTINE 21 MG/24HR
1 PATCH, TRANSDERMAL 24 HOURS TRANSDERMAL EVERY 24 HOURS
Status: DISCONTINUED | OUTPATIENT
Start: 2023-04-06 | End: 2023-04-13 | Stop reason: HOSPADM

## 2023-04-05 RX ORDER — BUPIVACAINE HCL/0.9 % NACL/PF 0.1 %
2 PLASTIC BAG, INJECTION (ML) EPIDURAL EVERY 8 HOURS
Qty: 1600 ML | Refills: 0
Start: 2023-04-05 | End: 2023-04-16

## 2023-04-05 RX ORDER — BUPIVACAINE HCL/0.9 % NACL/PF 0.1 %
2 PLASTIC BAG, INJECTION (ML) EPIDURAL EVERY 8 HOURS
Status: DISPENSED | OUTPATIENT
Start: 2023-04-05 | End: 2023-04-10

## 2023-04-05 RX ORDER — PANTOPRAZOLE SODIUM 40 MG/10ML
40 INJECTION, POWDER, LYOPHILIZED, FOR SOLUTION INTRAVENOUS
Start: 2023-04-06

## 2023-04-05 RX ORDER — SODIUM CHLORIDE 0.9 % (FLUSH) 0.9 %
3 SYRINGE (ML) INJECTION AS NEEDED
Status: DISCONTINUED | OUTPATIENT
Start: 2023-04-05 | End: 2023-04-13 | Stop reason: HOSPADM

## 2023-04-05 RX ORDER — SIMETHICONE 80 MG
80 TABLET,CHEWABLE ORAL 4 TIMES DAILY PRN
Status: DISCONTINUED | OUTPATIENT
Start: 2023-04-05 | End: 2023-04-13 | Stop reason: HOSPADM

## 2023-04-05 RX ORDER — BISACODYL 10 MG
10 SUPPOSITORY, RECTAL RECTAL DAILY PRN
Start: 2023-04-05

## 2023-04-05 RX ORDER — NALOXONE HCL 0.4 MG/ML
0.4 VIAL (ML) INJECTION
Status: DISCONTINUED | OUTPATIENT
Start: 2023-04-05 | End: 2023-04-13 | Stop reason: HOSPADM

## 2023-04-05 RX ORDER — INSULIN ASPART 100 [IU]/ML
2-12 INJECTION, SOLUTION INTRAVENOUS; SUBCUTANEOUS
Status: DISCONTINUED | OUTPATIENT
Start: 2023-04-05 | End: 2023-04-11

## 2023-04-05 RX ORDER — BISACODYL 10 MG
5 SUPPOSITORY, RECTAL RECTAL DAILY PRN
Status: DISCONTINUED | OUTPATIENT
Start: 2023-04-05 | End: 2023-04-13 | Stop reason: HOSPADM

## 2023-04-05 RX ORDER — DOCUSATE SODIUM 50 MG/5 ML
100 LIQUID (ML) ORAL DAILY
Start: 2023-04-06

## 2023-04-05 RX ORDER — POLYETHYLENE GLYCOL 3350 17 G/17G
17 POWDER, FOR SOLUTION ORAL DAILY
Status: DISCONTINUED | OUTPATIENT
Start: 2023-04-06 | End: 2023-04-11

## 2023-04-05 RX ORDER — ACETAMINOPHEN 160 MG/5ML
650 SOLUTION ORAL EVERY 6 HOURS PRN
Status: DISCONTINUED | OUTPATIENT
Start: 2023-04-05 | End: 2023-04-13 | Stop reason: HOSPADM

## 2023-04-05 RX ORDER — ATENOLOL 50 MG/1
100 TABLET ORAL DAILY
Status: DISCONTINUED | OUTPATIENT
Start: 2023-04-06 | End: 2023-04-13 | Stop reason: HOSPADM

## 2023-04-05 RX ORDER — ONDANSETRON 2 MG/ML
4 INJECTION INTRAMUSCULAR; INTRAVENOUS EVERY 4 HOURS PRN
Status: DISCONTINUED | OUTPATIENT
Start: 2023-04-05 | End: 2023-04-13 | Stop reason: HOSPADM

## 2023-04-05 RX ORDER — CHLORHEXIDINE GLUCONATE 0.12 MG/ML
15 RINSE ORAL EVERY 12 HOURS SCHEDULED
Status: DISCONTINUED | OUTPATIENT
Start: 2023-04-05 | End: 2023-04-11

## 2023-04-05 RX ORDER — PANTOPRAZOLE SODIUM 40 MG/10ML
40 INJECTION, POWDER, LYOPHILIZED, FOR SOLUTION INTRAVENOUS
Status: DISCONTINUED | OUTPATIENT
Start: 2023-04-06 | End: 2023-04-12

## 2023-04-05 RX ORDER — HYDRALAZINE HYDROCHLORIDE 20 MG/ML
10 INJECTION INTRAMUSCULAR; INTRAVENOUS EVERY 6 HOURS PRN
Start: 2023-04-05

## 2023-04-05 RX ORDER — BISACODYL 5 MG/1
5 TABLET, DELAYED RELEASE ORAL DAILY PRN
Start: 2023-04-05

## 2023-04-05 RX ORDER — DEXTROSE MONOHYDRATE 25 G/50ML
50 INJECTION, SOLUTION INTRAVENOUS ONCE AS NEEDED
Status: DISCONTINUED | OUTPATIENT
Start: 2023-04-05 | End: 2023-04-13 | Stop reason: HOSPADM

## 2023-04-05 RX ORDER — SODIUM CHLORIDE, SODIUM LACTATE, POTASSIUM CHLORIDE, CALCIUM CHLORIDE 600; 310; 30; 20 MG/100ML; MG/100ML; MG/100ML; MG/100ML
75 INJECTION, SOLUTION INTRAVENOUS CONTINUOUS
Status: DISCONTINUED | OUTPATIENT
Start: 2023-04-05 | End: 2023-04-10

## 2023-04-05 RX ADMIN — PROPOFOL 50 MCG/KG/MIN: 10 INJECTION, EMULSION INTRAVENOUS at 04:04

## 2023-04-05 RX ADMIN — PROPOFOL 40 MCG/KG/MIN: 10 INJECTION, EMULSION INTRAVENOUS at 07:57

## 2023-04-05 RX ADMIN — CEFAZOLIN 2 G: 10 INJECTION, POWDER, FOR SOLUTION INTRAVENOUS at 02:36

## 2023-04-05 RX ADMIN — DOCUSATE SODIUM 100 MG: 50 LIQUID ORAL at 08:44

## 2023-04-05 RX ADMIN — PANTOPRAZOLE SODIUM 40 MG: 40 INJECTION, POWDER, FOR SOLUTION INTRAVENOUS at 05:42

## 2023-04-05 RX ADMIN — MAGNESIUM HYDROXIDE 10 ML: 2400 SUSPENSION ORAL at 08:44

## 2023-04-05 RX ADMIN — POTASSIUM CHLORIDE 40 MEQ: 1.5 POWDER, FOR SOLUTION ORAL at 11:30

## 2023-04-05 RX ADMIN — Medication 10 ML: at 08:53

## 2023-04-05 RX ADMIN — SODIUM CHLORIDE, POTASSIUM CHLORIDE, SODIUM LACTATE AND CALCIUM CHLORIDE 75 ML/HR: 600; 310; 30; 20 INJECTION, SOLUTION INTRAVENOUS at 00:52

## 2023-04-05 RX ADMIN — PROPOFOL 100 MCG/KG/MIN: 10 INJECTION, EMULSION INTRAVENOUS at 10:06

## 2023-04-05 RX ADMIN — PROPOFOL 50 MCG/KG/MIN: 10 INJECTION, EMULSION INTRAVENOUS at 00:45

## 2023-04-05 RX ADMIN — PROPOFOL 90 MCG/KG/MIN: 10 INJECTION, EMULSION INTRAVENOUS at 12:21

## 2023-04-05 RX ADMIN — FENTANYL CITRATE 25 MCG: 50 INJECTION, SOLUTION INTRAMUSCULAR; INTRAVENOUS at 00:29

## 2023-04-05 RX ADMIN — PHENOBARBITAL SODIUM 32.5 MG: 65 INJECTION INTRAMUSCULAR at 08:43

## 2023-04-05 RX ADMIN — FENTANYL CITRATE 25 MCG: 50 INJECTION, SOLUTION INTRAMUSCULAR; INTRAVENOUS at 07:56

## 2023-04-05 RX ADMIN — DOCUSATE SODIUM 50 MG AND SENNOSIDES 8.6 MG 2 TABLET: 8.6; 5 TABLET, FILM COATED ORAL at 08:43

## 2023-04-05 RX ADMIN — CHLORHEXIDINE GLUCONATE 0.12% ORAL RINSE 15 ML: 1.2 LIQUID ORAL at 08:43

## 2023-04-05 RX ADMIN — POLYETHYLENE GLYCOL 3350 17 G: 17 POWDER, FOR SOLUTION ORAL at 08:43

## 2023-04-05 RX ADMIN — NICOTINE 1 PATCH: 21 PATCH, EXTENDED RELEASE TRANSDERMAL at 04:17

## 2023-04-05 RX ADMIN — ENOXAPARIN SODIUM 40 MG: 40 INJECTION SUBCUTANEOUS at 08:44

## 2023-04-05 RX ADMIN — ATENOLOL 100 MG: 50 TABLET ORAL at 08:43

## 2023-04-05 RX ADMIN — PHENOBARBITAL SODIUM 32.5 MG: 65 INJECTION INTRAMUSCULAR at 00:45

## 2023-04-05 RX ADMIN — CEFAZOLIN 2 G: 10 INJECTION, POWDER, FOR SOLUTION INTRAVENOUS at 09:36

## 2023-04-05 NOTE — PLAN OF CARE
Problem: Inability to Wean (Mechanical Ventilation, Invasive)  Goal: Mechanical Ventilation Liberation  Outcome: Ongoing, Progressing   Goal Outcome Evaluation:      Pt remains on vent. Will wean when tolerated.

## 2023-04-05 NOTE — ACP (ADVANCE CARE PLANNING)
[facility]  INPATIENT HISTORY AND PHYSICAL NOTE    PATIENT NAME: Jackson Acuña      PHYSICIAN: Di Hopkins MD  : 1985        MRN: [id number]  Patient Care Team:  Brandon Darling MD as PCP - General  Brandon Darling MD as PCP - Family Medicine    Assessment      Acute respiratory failure with hypoxia and hypercapnia  Fever  Staphylococcus aureus pneumonia  Delirium tremens  Anxiety disorder  Affective disorder  Alcohol withdrawal  Tansaminitis  Depression  Hypertension  Mitral valve prolapse  DVT & GI prphylaxis    DVT Prophylaxis: Lovenox  GI Prophylaxis: Famotidine  Code Status: Full    Plan     -Stable at time of exam.  -We will administer medications from previous hospitalization as applicable.  -Therapy to evaluate and treat.  -Monitor labs closely.  -Strongly recommend patient out of bed daily as tolerated.  -Monitor intake and output.  -DVT and GI prophylaxis in place.  -We'll continue monitoring patient in hospital setting and treat patient as course dictates.  -Please review orders for detailed plan of care.  -For Acute and Chronic medical condition we will continue medications described below in medication section which have been reviewed and we will continue unless changed in plan of care.  -Laboratory and diagnostic studies have been independently reviewed and the reports are reviewed as documented below.    Chief Complaint:  N/V syncope  History of Present Illness: Patient is a 36 y/o male who presented to [facility]  N/V/syncope and a period of hyperventilation after consuming alcohol, causing him to have withdrawal symptoms.  He was placed on supportive medications and requested to speak with psychiatry for alcohol cessation.  He in turn became severely agitated requiring intubation with mechanical ventilation in order to stablize him and maintain stabilization.  He was further noted with MSSA pneumonia and bacteremia and  is currently completing course of antibiotic therapy.  He has arrived to this facility for mechanical ventilation weaning, continued withdrawal treatment, and therapy.  Psychiatric services to follow.  Patient remains lying in bed on mechanical ventilation and sedation.  He is stable.  I have reviewed patient medical records, discharge summary and labs and diagnostics independently.  All labs and diagnostics are listed below.     Past Medical History:   Diagnosis Date   • Cat scratch fever    • Depression    • Ganglion cyst of wrist, left    • Hypertension    • Mitral valve prolapse    • Mitral valve prolapse    • Substance abuse    • Type 2 HSV infection of penis       Past Surgical History:   Procedure Laterality Date   • ANKLE SURGERY Right    • CYST REMOVAL Left     left wrist   • LYMPHADENECTOMY     • MANDIBLE FRACTURE SURGERY        Family History   Problem Relation Age of Onset   • No Known Problems Mother    • No Known Problems Father       Social History     Socioeconomic History   • Marital status:    Tobacco Use   • Smoking status: Every Day     Packs/day: 2.00     Years: 20.00     Pack years: 40.00     Types: Cigarettes   • Smokeless tobacco: Never   Vaping Use   • Vaping Use: Former   • Substances: THC   • Devices: Disposable   Substance and Sexual Activity   • Alcohol use: Yes     Alcohol/week: 1.0 standard drink     Types: 1 Shots of liquor per week     Comment: every day   • Drug use: Yes     Frequency: 28.0 times per week     Types: Marijuana   • Sexual activity: Yes     Partners: Female      Allergies   Allergen Reactions   • Penicillins Anaphylaxis   • Ibuprofen Nausea And Vomiting, Provider Review Needed and Other (See Comments)     Intolerance per paper chart from Dr Darling's private practice. Has taken so much of it over the years, he gets abdominal cramps and pain.   GI concerns      Prior to Admission medications    Medication Sig Start Date End Date Taking? Authorizing Provider    atenolol (TENORMIN) 100 MG tablet Take 1 tablet by mouth Daily. 4/9/19   Jessie Jackson APRN   bisacodyl (DULCOLAX) 10 MG suppository Insert 1 suppository into the rectum Daily As Needed for Constipation (Use if bisacodyl oral is ineffective). 4/5/23   Nikki Stallings MD   bisacodyl (DULCOLAX) 5 MG EC tablet Take 1 tablet by mouth Daily As Needed for Constipation (Use if polyethylene glycol is ineffective). 4/5/23   Nikki Stallings MD   ceFAZolin in 0.9% normal saline (ANCEF) 2 GM/ 100 mL solution IVPB Infuse 100 mL into a venous catheter Every 8 (Eight) Hours for 32 doses. Indications: Pneumonia 4/5/23 4/16/23  Nikki Stallings MD   docusate sodium (COLACE) 50 mg/5 mL liquid Take 10 mL by mouth Daily. 4/6/23   Nikki Stallings MD   hydrALAZINE (APRESOLINE) 20 MG/ML injection Infuse 0.5 mL into a venous catheter Every 6 (Six) Hours As Needed for High Blood Pressure. 4/5/23   Nikki Stallings MD   LORazepam 100 mg in sodium chloride 0.9 % 50 mL Infuse 0.5-10 mg/hr into a venous catheter Dose Adjusted By Provider As Needed. 4/5/23   Nikki Stallings MD   magnesium hydroxide (MILK OF MAGNESIA) 2400 MG/10ML suspension suspension Take 10 mL by mouth Daily. 4/6/23   Nikki Stallings MD   nicotine (NICODERM CQ) 21 MG/24HR patch Place 1 patch on the skin as directed by provider Daily. 4/6/23   Nikki Stallings MD   pantoprazole (PROTONIX) 40 MG injection Infuse 10 mL into a venous catheter Every Morning. Indications: Treatment to Prevent Stress Ulcers 4/6/23   Nikki Stallings MD   PHENobarbital 32.5 mg in sodium chloride 0.9 % 100 mL IVPB Infuse 32.5 mg into a venous catheter Every 8 (Eight) Hours. 4/5/23   Nikki Stallings MD   polyethylene glycol (MIRALAX) 17 g packet Take 17 g by mouth Daily. 4/6/23   Nikki Stallings MD   propofol (DIPRIVAN) 10 mg/mL emulsion infusion Infuse 427-8,540 mcg/min into a venous catheter Dose Adjusted By Provider As Needed. 4/5/23   Nikki Stallings MD   sennosides-docusate (PERICOLACE) 8.6-50 MG per tablet Take 2  tablets by mouth 2 (Two) Times a Day. 4/5/23   Nikki Stallings MD     Current Medications:   [START ON 4/6/2023] atenolol, 100 mg, Nasogastric, Daily  ceFAZolin, 2 g, Intravenous, Q8H  chlorhexidine, 15 mL, Mouth/Throat, Q12H  [START ON 4/6/2023] docusate sodium, 100 mg, Nasogastric, Daily  [START ON 4/6/2023] enoxaparin, 40 mg, Subcutaneous, Daily  Insulin Aspart, 2-12 Units, Subcutaneous, 4x Daily AC & at Bedtime  [START ON 4/6/2023] magnesium hydroxide, 10 mL, Nasogastric, Daily  [START ON 4/6/2023] nicotine, 1 patch, Transdermal, Q24H  [START ON 4/6/2023] pantoprazole, 40 mg, Intravenous, Q AM  PHENobarbital IVPB in 100 mL, 32.5 mg, Intravenous, Q8H  [START ON 4/6/2023] polyethylene glycol, 17 g, Nasogastric, Daily  senna-docusate sodium, 2 tablet, Nasogastric, BID  sodium chloride, 3 mL, Intracatheter, Q12H       •  acetaminophen  •  bisacodyl  •  dextrose  •  hydrALAZINE  •  LORazepam  •  naloxone  •  ondansetron  •  simethicone  •  sodium chloride  •  sodium chloride   lactated ringers, 75 mL/hr  LORazepam (ATIVAN) infusion 1 mg/mL, 0.5-10 mg/hr  midazolam, 1-10 mg/hr  propofol, 5-50 mcg/kg/min       Review of Systems:    Review of Systems   Unable to perform ROS: Intubated       Objective   Vital Signs  Temp: 98.9 F         Heart Rate: 102         Resp: 18          Blood Pressure: 116/71         Pulse Ox: 94 %    Physical Exam:   Physical Exam  Constitutional:       General: He is not in acute distress.     Appearance: He is ill-appearing.   HENT:      Head: Normocephalic and atraumatic.   Eyes:      Conjunctiva/sclera: Conjunctivae normal.      Pupils: Pupils are equal, round, and reactive to light.   Cardiovascular:      Rate and Rhythm: Normal rate and regular rhythm.      Pulses: Normal pulses.      Heart sounds: Normal heart sounds. No murmur heard.  Pulmonary:      Effort: Pulmonary effort is normal. No respiratory distress.      Breath sounds: Normal breath sounds. No rales.   Abdominal:      General:  Bowel sounds are normal. There is no distension.      Palpations: Abdomen is soft.      Tenderness: There is no guarding.   Musculoskeletal:      Cervical back: No rigidity or tenderness.      Right lower leg: No edema.      Left lower leg: No edema.   Skin:     General: Skin is warm and dry.      Capillary Refill: Capillary refill takes 2 to 3 seconds.   Neurological:      Comments: Intubated   Psychiatric:      Comments: Intubated       Results Review:    I have personally reviewed current lab, radiology, and diagnostic data and agree with results.  Results from last 7 days   Lab Units 04/05/23  0630 04/03/23  0528 04/02/23  0525 03/31/23  0734   SODIUM mmol/L 140 142 139 141   POTASSIUM mmol/L 3.6 3.9 3.8 3.8   CHLORIDE mmol/L 104 106 105 106   CO2 mmol/L 24.0 23.0 21.0* 24.0   BUN mg/dL 12 14 12 12   CREATININE mg/dL 0.58* 0.77 0.68* 0.71*   GLUCOSE mg/dL 100* 112* 119* 111*   CALCIUM mg/dL 9.4 9.4 9.2 8.9   BILIRUBIN mg/dL 0.2 0.2 0.2  --    ALK PHOS U/L 86 95 96  --    ALT (SGPT) U/L 22 28 26  --    AST (SGOT) U/L 18 31 22  --      Results from last 7 days   Lab Units 04/02/23  0525 03/31/23  0734   MAGNESIUM mg/dL 2.2 2.2     Results from last 7 days   Lab Units 04/05/23  0630 04/03/23  0528 04/02/23  0525 03/31/23  0733   WBC 10*3/mm3 12.16* 14.80* 10.30 8.69   HEMOGLOBIN g/dL 12.1* 12.6* 12.1* 11.2*   HEMATOCRIT % 35.3* 38.0 36.0* 34.3*   PLATELETS 10*3/mm3 167 340 407 357     Lab Results   Component Value Date    CKTOTAL 121 06/16/2021    TROPONINT 7 03/22/2023     CO2   Date Value Ref Range Status   04/05/2023 24.0 22.0 - 29.0 mmol/L Final         Imaging Results (Most Recent)     Procedure Component Value Units Date/Time    XR Abdomen KUB [[id number]] Collected: 04/05/23 1512     Updated: 04/05/23 1535    Narrative:      FINDINGS:  A film of the upper abdomen shows a nasoenteric feeding tube with its tip in the  third portion of the duodenum.  This has advanced when compared with 3/24/2023.    XR Chest 1  View [[id number]] Collected: 04/05/23 1511     Updated: 04/05/23 1534    Narrative:      FINDINGS:  AP view of the chest shows some minimal volume loss in the left lung base.    No other active disease.    Endotracheal tube and nasoenteric feeding tube in place.  Midline catheter in  place within the right arm.        Blood Culture   Date Value Ref Range Status   04/02/2023 No growth at 3 days  Preliminary     BCID, PCR   Date Value Ref Range Status   04/02/2023 (A) Negative by BCID PCR. Culture to Follow. Final    Staph spp, not aureus or lugdunensis. Identification by BCID2 PCR.     No results found for: CULTURES, HSVCX, URCX  No results found for: EYECULTURE, GCCX, HSVCULTURE, LABHSV  No results found for: LEGIONELLA, MRSACX, MUMPSCX, MYCOPLASCX  No results found for: NOCARDIACX, STOOLCX  No results found for: THROATCX, UNSTIMCULT, URINECX, CULTURE, VZVCULTUR  No results found for: VIRALCULTU, WOUNDCX  Dietary Orders (From admission, onward)     Start     Ordered    04/05/23 1438  Diet, Tube Feeding Peptamen AF (Vital AF 1.2); Tube Feeding Type: Continuous; Continuous Tube Feeding Start Rate (mL/hr): Other; Other: 50; Then Advance Rate By (mL/hr): Do Not Advance; Every __ Hours: 1; To Goal Rate of (mL/hr): 50  Diet Effective Now        Question Answer Comment   Tube Feeding Formula: Peptamen AF (Vital AF 1.2)    Tube Feeding Type Continuous    Continuous Tube Feeding Start Rate (mL/hr) Other    Other 50    Then Advance Rate By (mL/hr) Do Not Advance    Every __ Hours 1    To Goal Rate of (mL/hr) 50    Water Flush Amount (mL) 25    Water Flush Frequency Every 1 Hour        04/05/23 1441    04/05/23 1437  NPO Diet NPO Type: Strict NPO  Diet Effective Now        Question:  NPO Type  Answer:  Strict NPO    04/05/23 1441                Total Time Spent: 47 minutes.  History, physical exam, assessment and plan may have been partly or fully copied from before, but  changes made to the copied record to reflect care on the  date of service. Part of the lab and imaging  reports auto populated and corrected. Some of this note may be an electronic transcription of spoken  language to printed text. This may permit erroneous, or at times, nonsensical words or phrases to be  inadvertently transcribed. Although I have reviewed the note for such errors, some may still exist.      This document has been electronically signed by Di Hopkins MD on April 5, 2023 16:03 CDT

## 2023-04-05 NOTE — DISCHARGE SUMMARY
[facility]  DISCHARGE SUMMARY       Date of Admission: 3/22/2023  Date of Discharge:  4/5/2023  Primary Care Physician: Brandon Darling MD    Presenting Problem:  Alcohol withdrawal [F10.939]     Final Discharge Diagnoses:  Active Hospital Problems    Diagnosis    • **Acute respiratory failure with hypoxia and hypercapnia    • Fever    • Staphylococcus aureus pneumonia    • Delirium tremens    • Anxiety disorder    • Affective disorder    • Alcohol withdrawal    • Transaminitis    HPI: nausea, vomiting, syncope    Consults:   Consults     Date and Time Order Name Status Description    3/23/2023 10:41 AM Inpatient Psychiatrist Consult Completed       Procedures Performed:      Endotracheal intubation  Pertinent Test Results:   Lab Results (most recent)     Procedure Component Value Units Date/Time    Comprehensive Metabolic Panel [[id number]]  (Abnormal) Collected: 04/05/23 0630    Specimen: Blood Updated: 04/05/23 0701     Glucose 100 mg/dL      BUN 12 mg/dL      Creatinine 0.58 mg/dL      Sodium 140 mmol/L      Potassium 3.6 mmol/L      Chloride 104 mmol/L      CO2 24.0 mmol/L      Calcium 9.4 mg/dL      Total Protein 7.0 g/dL      Albumin 3.9 g/dL      ALT (SGPT) 22 U/L      AST (SGOT) 18 U/L      Alkaline Phosphatase 86 U/L      Total Bilirubin 0.2 mg/dL      Globulin 3.1 gm/dL      A/G Ratio 1.3 g/dL      BUN/Creatinine Ratio 20.7     Anion Gap 12.0 mmol/L      eGFR 129.6 mL/min/1.73     Narrative:      GFR Normal >60  Chronic Kidney Disease <60  Kidney Failure <15      C-reactive Protein [[id number]]  (Abnormal) Collected: 04/05/23 0630    Specimen: Blood Updated: 04/05/23 0701     C-Reactive Protein 2.51 mg/dL     CBC & Differential [[id number]]  (Abnormal) Collected: 04/05/23 0630    Specimen: Blood Updated: 04/05/23 0644    Narrative:      The following orders were created for panel order CBC & Differential.  Procedure                                Abnormality         Status                     ---------                               -----------         ------                     CBC Auto Differential[[id number]]        Abnormal            Final result                 Please view results for these tests on the individual orders.    CBC Auto Differential [[id number]]  (Abnormal) Collected: 04/05/23 0630    Specimen: Blood Updated: 04/05/23 0644     WBC 12.16 10*3/mm3      RBC 3.57 10*6/mm3      Hemoglobin 12.1 g/dL      Hematocrit 35.3 %      MCV 98.9 fL      MCH 33.9 pg      MCHC 34.3 g/dL      RDW 14.6 %      RDW-SD 52.9 fl      MPV 10.4 fL      Platelets 167 10*3/mm3      Neutrophil % 69.5 %      Lymphocyte % 14.4 %      Monocyte % 9.1 %      Eosinophil % 2.1 %      Basophil % 1.0 %      Immature Grans % 3.9 %      Neutrophils, Absolute 8.45 10*3/mm3      Lymphocytes, Absolute 1.75 10*3/mm3      Monocytes, Absolute 1.11 10*3/mm3      Eosinophils, Absolute 0.26 10*3/mm3      Basophils, Absolute 0.12 10*3/mm3      Immature Grans, Absolute 0.47 10*3/mm3      nRBC 0.0 /100 WBC     POC Glucose Once [[id number]]  (Normal) Collected: 04/05/23 0544    Specimen: Blood Updated: 04/05/23 0603     Glucose 92 mg/dL      Comment: : [id number] Rithmio ANTHONYMeter ID: [id number]       POC Glucose Once [[id number]]  (Normal) Collected: 04/05/23 0050    Specimen: Blood Updated: 04/05/23 0603     Glucose 104 mg/dL      Comment: : [phone] Rithmio ANTHONYMeter ID: [id number]       Blood Culture - Blood, Arm, Left [[id number]]  (Normal) Collected: 04/02/23 0920    Specimen: Blood from Arm, Left Updated: 04/04/23 0945     Blood Culture No growth at 2 days    Respiratory Culture - Sputum, ET Suction [[id number]]  (Abnormal) Collected: 04/03/23 0719    Specimen: Sputum from ET Suction Updated: 04/04/23 0929     Respiratory Culture Scant growth (1+) Staphylococcus aureus     Comment: Refer to Respiratory Culture from 4/2/23 for [facility]ICS           Rare Normal  Respiratory Derrick     Gram Stain Moderate (3+) WBCs per low power field      Rare (1+) Epithelial cells per low power field      Mixed bacterial derrick    Respiratory Culture - Sputum, ET Suction [[id number]]  (Abnormal)  (Susceptibility) Collected: 04/02/23 1047    Specimen: Sputum from ET Suction Updated: 04/04/23 0830     Respiratory Culture Moderate growth (3+) Staphylococcus aureus      Light growth (2+) Normal Respiratory Derrick     Gram Stain Many (4+) WBCs per low power field      Rare (1+) Epithelial cells per low power field      Mixed bacterial derrick    Susceptibility      Staphylococcus aureus      BOONE      Clindamycin Resistant     Inducible Clindamycin Resistance Positive      Oxacillin Susceptible      Tetracycline Susceptible      Trimethoprim + Sulfamethoxazole Susceptible      Vancomycin Susceptible                       Susceptibility Comments     Staphylococcus aureus    This isolate is presumed to be clindamycin resistant based on detection of inducible clindamycin resistance.  Clindamycin may still be effective in some patients.  This isolate is presumed to be clindamycin resistant based on detection of inducible clindamycin resistance.  Clindamycin may still be effective in some patients.  This isolate is presumed to be clindamycin resistant based on detection of inducible clindamycin resistance.  Clindamycin may still be effective in some patients.             Blood Culture - Blood, Arm, Right [[id number]]  (Abnormal) Collected: 04/02/23 0919    Specimen: Blood from Arm, Right Updated: 04/04/23 0631     Blood Culture Staphylococcus, coagulase negative     Isolated from Aerobic and Anaerobic Bottles     Gram Stain Aerobic Bottle Gram positive cocci in clusters      Anaerobic Bottle Gram positive cocci in clusters     Comment: 2 bottles of 4 drawn positive for gram positive cocci in cluesters       Narrative:      Probable contaminant requires clinical correlation, susceptibility not performed  "unless requested by physician.  1 of 4 bottles drawn positive for gram positive cocci    Blood Culture ID, PCR - Blood, Arm, Right [[id number]]  (Abnormal) Collected: 04/02/23 0919    Specimen: Blood from Arm, Right Updated: 04/03/23 1433     BCID, PCR Staph spp, not aureus or lugdunensis. Identification by BCID2 PCR.    Urinalysis With Culture If Indicated - Indwelling Urethral Catheter [[id number]]  (Normal) Collected: 04/03/23 0817    Specimen: Urine from Indwelling Urethral Catheter Updated: 04/03/23 0847     Color, UA Yellow     Appearance, UA Clear     pH, UA 7.0     Specific Gravity, UA 1.014     Glucose, UA Negative     Ketones, UA Negative     Bilirubin, UA Negative     Blood, UA Negative     Protein, UA Negative     Leuk Esterase, UA Negative     Nitrite, UA Negative     Urobilinogen, UA 1.0 E.U./dL    Narrative:      In absence of clinical symptoms, the presence of pyuria, bacteria, and/or nitrites on the urinalysis result does not correlate with infection.  Urine microscopic not indicated.    Procalcitonin [[id number]]  (Normal) Collected: 04/03/23 0659    Specimen: Blood Updated: 04/03/23 0732     Procalcitonin 0.09 ng/mL     Narrative:      As a Marker for Sepsis (Non-Neonates):    1. <0.5 ng/mL represents a low risk of severe sepsis and/or septic shock.  2. >2 ng/mL represents a high risk of severe sepsis and/or septic shock.    As a Marker for Lower Respiratory Tract Infections that require antibiotic therapy:    PCT on Admission    Antibiotic Therapy       6-12 Hrs later    >0.5                Strongly Recommended  >0.25 - <0.5        Recommended   0.1 - 0.25          Discouraged              Remeasure/reassess PCT  <0.1                Strongly Discouraged     Remeasure/reassess PCT    As 28 day mortality risk marker: \"Change in Procalcitonin Result\" (>80% or <=80%) if Day 0 (or Day 1) and Day 4 values are available. Refer to http://www.[facility]-pct-calculator.com    Change in PCT <=80%  A decrease of " PCT levels below or equal to 80% defines a positive change in PCT test result representing a higher risk for 28-day all-cause mortality of patients diagnosed with severe sepsis for septic shock.    Change in PCT >80%  A decrease of PCT levels of more than 80% defines a negative change in PCT result representing a lower risk for 28-day all-cause mortality of patients diagnosed with severe sepsis or septic shock.       Lactic Acid, Plasma [[id number]]  (Normal) Collected: 04/03/23 0659    Specimen: Blood Updated: 04/03/23 0719     Lactate 1.0 mmol/L     Comprehensive Metabolic Panel [[id number]]  (Abnormal) Collected: 04/03/23 0528    Specimen: Blood Updated: 04/03/23 0603     Glucose 112 mg/dL      BUN 14 mg/dL      Creatinine 0.77 mg/dL      Sodium 142 mmol/L      Potassium 3.9 mmol/L      Chloride 106 mmol/L      CO2 23.0 mmol/L      Calcium 9.4 mg/dL      Total Protein 7.0 g/dL      Albumin 3.8 g/dL      ALT (SGPT) 28 U/L      AST (SGOT) 31 U/L      Alkaline Phosphatase 95 U/L      Total Bilirubin 0.2 mg/dL      Globulin 3.2 gm/dL      A/G Ratio 1.2 g/dL      BUN/Creatinine Ratio 18.2     Anion Gap 13.0 mmol/L      eGFR 119.0 mL/min/1.73     Narrative:      GFR Normal >60  Chronic Kidney Disease <60  Kidney Failure <15      CBC & Differential [[id number]]  (Abnormal) Collected: 04/03/23 0528    Specimen: Blood Updated: 04/03/23 0551    Narrative:      The following orders were created for panel order CBC & Differential.  Procedure                               Abnormality         Status                     ---------                               -----------         ------                     CBC Auto Differential[[id number]]        Abnormal            Final result               Scan Slide[[id number]]                                                                    Please view results for these tests on the individual orders.    CBC Auto Differential [[id number]]  (Abnormal) Collected: 04/03/23 0528    Specimen: Blood  Updated: 04/03/23 0551     WBC 14.80 10*3/mm3      RBC 3.74 10*6/mm3      Hemoglobin 12.6 g/dL      Hematocrit 38.0 %      .6 fL      MCH 33.7 pg      MCHC 33.2 g/dL      RDW 14.7 %      RDW-SD 55.4 fl      MPV 10.0 fL      Platelets 340 10*3/mm3      Neutrophil % 73.4 %      Lymphocyte % 9.7 %      Monocyte % 9.6 %      Eosinophil % 1.6 %      Basophil % 1.3 %      Immature Grans % 4.4 %      Neutrophils, Absolute 10.88 10*3/mm3      Lymphocytes, Absolute 1.43 10*3/mm3      Monocytes, Absolute 1.42 10*3/mm3      Eosinophils, Absolute 0.23 10*3/mm3      Basophils, Absolute 0.19 10*3/mm3      Immature Grans, Absolute 0.65 10*3/mm3      nRBC 0.1 /100 WBC     Urinalysis, Microscopic Only - Indwelling Urethral Catheter [[id number]]  (Abnormal) Collected: 04/02/23 0952    Specimen: Urine from Indwelling Urethral Catheter Updated: 04/02/23 1037     RBC, UA 0-2 /HPF      WBC, UA 3-5 /HPF      Comment: Urine culture not indicated.        Bacteria, UA Trace /HPF      Squamous Epithelial Cells, UA None Seen /HPF      Hyaline Casts, UA None Seen /LPF      Granular Casts, UA 0-2 /LPF      Methodology Manual Light Microscopy    Urinalysis With Culture If Indicated - Indwelling Urethral Catheter [[id number]]  (Abnormal) Collected: 04/02/23 0952    Specimen: Urine from Indwelling Urethral Catheter Updated: 04/02/23 1014     Color, UA Dark Yellow     Appearance, UA Clear     pH, UA 5.5     Specific [place], UA 1.036     Comment: Result obtained by Refractometer        Glucose, UA Negative     Ketones, UA Trace     Bilirubin, UA Negative     Blood, UA Negative     Protein, UA Trace     Leuk Esterase, UA Trace     Nitrite, UA Negative     Urobilinogen, UA 1.0 E.U./dL    Narrative:      In absence of clinical symptoms, the presence of pyuria, bacteria, and/or nitrites on the urinalysis result does not correlate with infection.    Magnesium [[id number]]  (Normal) Collected: 04/02/23 0525    Specimen: Blood Updated: 04/02/23 0551      Magnesium 2.2 mg/dL     Blood Gas, Arterial - [[id number]]  (Abnormal) Collected: 04/01/23 0411    Specimen: Arterial Blood Updated: 04/01/23 0412     Site Left Brachial     Kush's Test N/A     pH, Arterial 7.474 pH units      Comment: 83 Value above reference range        pCO2, Arterial 36.1 mm Hg      pO2, Arterial 65.2 mm Hg      Comment: 84 Value below reference range        HCO3, Arterial 26.5 mmol/L      Comment: 83 Value above reference range        Base Excess, Arterial 2.9 mmol/L      Comment: 83 Value above reference range        O2 Saturation, Arterial 94.1 %      Barometric Pressure for Blood Gas 738 mmHg      Modality Ventilator     FIO2 30 %      Ventilator Mode NA     Set Tidal Volume 550     Set Mech Resp Rate 16.0     PEEP 5.0     Collected by júnior     Comment: Meter: [id number]     :  [id number]       Basic Metabolic Panel [[id number]]  (Abnormal) Collected: 03/31/23 0734    Specimen: Blood Updated: 03/31/23 0812     Glucose 111 mg/dL      BUN 12 mg/dL      Creatinine 0.71 mg/dL      Sodium 141 mmol/L      Potassium 3.8 mmol/L      Chloride 106 mmol/L      CO2 24.0 mmol/L      Calcium 8.9 mg/dL      BUN/Creatinine Ratio 16.9     Anion Gap 11.0 mmol/L      eGFR 121.9 mL/min/1.73     Narrative:      GFR Normal >60  Chronic Kidney Disease <60  Kidney Failure <15      Magnesium [[id number]]  (Normal) Collected: 03/31/23 0734    Specimen: Blood Updated: 03/31/23 0807     Magnesium 2.2 mg/dL     CBC (No Diff) [[id number]]  (Abnormal) Collected: 03/29/23 0944    Specimen: Blood Updated: 03/29/23 0955     WBC 8.60 10*3/mm3      RBC 3.16 10*6/mm3      Hemoglobin 10.8 g/dL      Hematocrit 32.0 %      .3 fL      MCH 34.2 pg      MCHC 33.8 g/dL      RDW 14.6 %      RDW-SD 54.7 fl      MPV 10.4 fL      Platelets 267 10*3/mm3     Basic Metabolic Panel [[id number]]  (Abnormal) Collected: 03/28/23 0512    Specimen: Blood Updated: 03/28/23 0539     Glucose 104 mg/dL      BUN 5 mg/dL      Creatinine  "0.75 mg/dL      Sodium 138 mmol/L      Potassium 3.8 mmol/L      Chloride 104 mmol/L      CO2 25.0 mmol/L      Calcium 8.7 mg/dL      BUN/Creatinine Ratio 6.7     Anion Gap 9.0 mmol/L      eGFR 119.9 mL/min/1.73     Narrative:      GFR Normal >60  Chronic Kidney Disease <60  Kidney Failure <15      Urinalysis, Microscopic Only - Indwelling Urethral Catheter [[id number]]  (Abnormal) Collected: 03/27/23 1459    Specimen: Urine from Indwelling Urethral Catheter Updated: 03/27/23 1518     RBC, UA 3-5 /HPF      WBC, UA 0-2 /HPF      Comment: Urine culture not indicated.        Bacteria, UA None Seen /HPF      Squamous Epithelial Cells, UA None Seen /HPF      Hyaline Casts, UA None Seen /LPF      Methodology Automated Microscopy    Procalcitonin [[id number]]  (Normal) Collected: 03/27/23 1311    Specimen: Blood Updated: 03/27/23 1354     Procalcitonin 0.12 ng/mL     Narrative:      As a Marker for Sepsis (Non-Neonates):    1. <0.5 ng/mL represents a low risk of severe sepsis and/or septic shock.  2. >2 ng/mL represents a high risk of severe sepsis and/or septic shock.    As a Marker for Lower Respiratory Tract Infections that require antibiotic therapy:    PCT on Admission    Antibiotic Therapy       6-12 Hrs later    >0.5                Strongly Recommended  >0.25 - <0.5        Recommended   0.1 - 0.25          Discouraged              Remeasure/reassess PCT  <0.1                Strongly Discouraged     Remeasure/reassess PCT    As 28 day mortality risk marker: \"Change in Procalcitonin Result\" (>80% or <=80%) if Day 0 (or Day 1) and Day 4 values are available. Refer to http://www.[facility]-pct-calculator.com    Change in PCT <=80%  A decrease of PCT levels below or equal to 80% defines a positive change in PCT test result representing a higher risk for 28-day all-cause mortality of patients diagnosed with severe sepsis for septic shock.    Change in PCT >80%  A decrease of PCT levels of more than 80% defines a negative change " in PCT result representing a lower risk for 28-day all-cause mortality of patients diagnosed with severe sepsis or septic shock.       Lactic Acid, Plasma [[id number]]  (Normal) Collected: 03/27/23 1311    Specimen: Blood from Arm, Left Updated: 03/27/23 1342     Lactate 1.3 mmol/L     Potassium [[id number]]  (Normal) Collected: 03/27/23 1218    Specimen: Blood Updated: 03/27/23 1245     Potassium 4.2 mmol/L     Blood Gas, Arterial - [[id number]]  (Abnormal) Collected: 03/27/23 0428    Specimen: Arterial Blood Updated: 03/27/23 0430     Site Left Radial     Kush's Test N/A     pH, Arterial 7.431 pH units      pCO2, Arterial 39.6 mm Hg      pO2, Arterial 84.7 mm Hg      HCO3, Arterial 26.3 mmol/L      Comment: 83 Value above reference range        Base Excess, Arterial 1.9 mmol/L      O2 Saturation, Arterial 97.7 %      Barometric Pressure for Blood Gas 746 mmHg      Modality Ventilator     FIO2 30 %      Ventilator Mode AC     Set Tidal Volume 560     Set Mech Resp Rate 16.0     PEEP 5.0     Collected by      Comment: Meter: [id number]     :  [id number]       Potassium [[id number]]  (Normal) Collected: 03/24/23 2221    Specimen: Blood Updated: 03/24/23 2247     Potassium 4.8 mmol/L      Comment: Slight hemolysis detected by analyzer. Results may be affected.       Hepatitis B Surface Antibody [[id number]]  (Normal) Collected: 03/23/23 2345    Specimen: Blood Updated: 03/24/23 1245     Hep B S Ab Non-Reactive    Narrative:      Non-Reactive - Individual is considered to be not immune to infection with HBV.    Equivocal - Unable to determine if anti-HBs is present at levels consistent with immunity. The individual should be further assessed by associated risk factors and the use of additional diagnositc information, or another sample may be collected and tested.    Reactive - Anti-HBs concentration detected at >10 mIU/mL. Individual is considered to be immune to infection with HBV.      Results may be  falsely decreased if patient taking Biotin.      Extra Tubes [[id number]] Collected: 03/24/23 0453    Specimen: Blood, Venous Line Updated: 03/24/23 0601    Narrative:      The following orders were created for panel order Extra Tubes.  Procedure                               Abnormality         Status                     ---------                               -----------         ------                     Lavender Top[[id number]]                                     Final result                 Please view results for these tests on the individual orders.    Lavender Top [[id number]] Collected: 03/24/23 0453    Specimen: Blood Updated: 03/24/23 0601     Extra Tube hold for add-on     Comment: Auto resulted       Hepatitis C Antibody [[id number]]  (Normal) Collected: 03/23/23 2349    Specimen: Blood Updated: 03/24/23 0336     Hepatitis C Ab Non-Reactive    Narrative:      Results may be falsely decreased if patient taking Biotin.      HIV-1 / O / 2 Ag / Antibody 4th Generation [[id number]]  (Normal) Collected: 03/23/23 2349    Specimen: Blood Updated: 03/24/23 0336     HIV-1/ HIV-2 Non-Reactive    Narrative:      The HIV antibody/antigen combo assay is a qualitative assay for HIV that includes the p24 antigen as well as antibodies to HIV types 1 and 2. This test is intended to be used as a screening assay in the diagnosis of HIV infection in patients over the age of 2.    Hepatitis B Surface Antigen [[id number]]  (Normal) Collected: 03/23/23 2345    Specimen: Blood Updated: 03/24/23 0326     Hepatitis B Surface Ag Non-Reactive    CBC (No Diff) [[id number]]  (Abnormal) Collected: 03/23/23 2345    Specimen: Blood Updated: 03/23/23 2352     WBC 5.28 10*3/mm3      RBC 3.78 10*6/mm3      Hemoglobin 13.0 g/dL      Hematocrit 37.5 %      MCV 99.2 fL      MCH 34.4 pg      MCHC 34.7 g/dL      RDW 13.6 %      RDW-SD 49.9 fl      MPV 10.3 fL      Platelets 183 10*3/mm3     Urine Drug Screen - Urine, Clean Catch [[id number]]   (Abnormal) Collected: 03/22/23 0843    Specimen: Urine, Clean Catch Updated: 03/22/23 0856     THC, Screen, Urine Positive     Phencyclidine (PCP), Urine Negative     Cocaine Screen, Urine Negative     Methamphetamine, Ur Negative     Opiate Screen Negative     Amphetamine Screen, Urine Negative     Benzodiazepine Screen, Urine Positive     Tricyclic Antidepressants Screen Negative     Methadone Screen, Urine Negative     Barbiturates Screen, Urine Positive     Oxycodone Screen, Urine Negative     Propoxyphene Screen Negative     Buprenorphine, Screen, Urine Negative    Narrative:      Cutoff For Drugs Screened:    Amphetamines               500 ng/ml  Barbiturates               200 ng/ml  Benzodiazepines            150 ng/ml  Cocaine                    150 ng/ml  Methadone                  200 ng/ml  Opiates                    100 ng/ml  Phencyclidine               25 ng/ml  THC                            50 ng/ml  Methamphetamine            500 ng/ml  Tricyclic Antidepressants  300 ng/ml  Oxycodone                  100 ng/ml  Propoxyphene               300 ng/ml  Buprenorphine               10 ng/ml    The normal value for all drugs tested is negative. This report includes unconfirmed screening results, with the cutoff values listed, to be used for medical treatment purposes only.  Unconfirmed results must not be used for non-medical purposes such as employment or legal testing.  Clinical consideration should be applied to any drug of abuse test, particularly when unconfirmed results are used.      High Sensitivity Troponin T 2Hr [[id number]]  (Normal) Collected: 03/22/23 0726    Specimen: Blood Updated: 03/22/23 0757     HS Troponin T 7 ng/L      Troponin T Delta 0 ng/L     Narrative:      High Sensitive Troponin T Reference Range:  <10.0 ng/L- Negative Female for AMI  <15.0 ng/L- Negative Male for AMI  >=10 - Abnormal Female indicating possible myocardial injury.  >=15 - Abnormal Male indicating possible  myocardial injury.   Clinicians would have to utilize clinical acumen, EKG, Troponin, and serial changes to determine if it is an Acute Myocardial Infarction or myocardial injury due to an underlying chronic condition.         High Sensitivity Troponin T [[id number]]  (Normal) Collected: 03/22/23 0345    Specimen: Blood Updated: 03/22/23 0719     HS Troponin T 7 ng/L     Narrative:      High Sensitive Troponin T Reference Range:  <10.0 ng/L- Negative Female for AMI  <15.0 ng/L- Negative Male for AMI  >=10 - Abnormal Female indicating possible myocardial injury.  >=15 - Abnormal Male indicating possible myocardial injury.   Clinicians would have to utilize clinical acumen, EKG, Troponin, and serial changes to determine if it is an Acute Myocardial Infarction or myocardial injury due to an underlying chronic condition.         Ethanol [[id number]] Collected: 03/22/23 0345    Specimen: Blood Updated: 03/22/23 0415     Ethanol <10 mg/dL      Ethanol % <0.010 %     Acetaminophen Level [[id number]]  (Normal) Collected: 03/22/23 0345    Specimen: Blood Updated: 03/22/23 0415     Acetaminophen <5.0 mcg/mL     Salicylate Level [[id number]]  (Normal) Collected: 03/22/23 0345    Specimen: Blood Updated: 03/22/23 0415     Salicylate <0.3 mg/dL         Imaging Results (Most Recent)     Procedure Component Value Units Date/Time    XR Chest 1 View [[id number]] Collected: 04/03/23 0442     Updated: 04/03/23 0519    Narrative:      EXAM: Single portable XR view of the chest  INDICATION: respiratory failure  COMPARISON: 4/2/2023 radiograph    FINDINGS:  Support lines and devices are stable in position.  The cardiomediastinal silhouette is stable.   Slightly increased pulmonary vascular congestive changes. No  large pleural effusion, visible pneumothorax, or focal  consolidation.      Impression:        Slightly increased pulmonary vascular congestive changes.    Electronically signed by:  Jose Maria Smith MD  4/3/2023 5:17 AM  CDT  Workstation: [id number]    XR Chest 1 View [[id number]] Collected: 04/02/23 0912     Updated: 04/02/23 0935    Narrative:      EXAM: Chest, 1 AP view  CLINICAL INDICATION: Intubated, fever    COMPARISON: Chest radiographs 3/23/2023    FINDINGS:    There is Dobbhoff tube coursing through the esophagus.  There is endotracheal tube 4.9 cm above levi.    There are stable mild perihilar and basilar interstitial changes,  as well as possible slight blunting of left costophrenic angle.    There is no pneumothorax or interval cardiomegaly.    There is no acute or aggressive osseous lesion.      Impression:      CONCLUSION:     1. Stable appearing endotracheal tube position.    2. Stable mild bilateral interstitial changes and possible small  left-sided pleural effusion. See report for other details.    Electronically signed by:  Kyrie Crabtree DO  4/2/2023 9:33 AM CDT  Workstation: [id number]    CT Chest Without Contrast Diagnostic [[id number]] Collected: 03/27/23 1549     Updated: 03/28/23 1316    Narrative:      PROCEDURE: CT CHEST WO CONTRAST DIAGNOSTIC    INDICATION: Pneumonia    COMPARISON: Chest x-ray 2/23/2023     TECHNIQUE:    - reconstructions: Axial, coronal, sagittal    - contrast:  oral:  None                       intravenous: None    This exam was performed according to our departmental  dose-optimization program, which includes automated exposure  control, adjustment of the mA and/or kV according to patient size  and/or use of iterative reconstruction technique.  DLP is 1195.8    FINDINGS:  Endotracheal tube terminates the level of the clavicular heads.  Enteric tube is incompletely included in the study but seen at  least as far distally as the gastroduodenal junction    PULMONARY PARENCHYMA:      -Hepatic groundglass opacities in the upper lobes bilaterally.  There is partial consolidation of the lower lobes dependently,  left greater than right.     MEDIASTINUM / PADMINI:      - heart: Normal size, no  pericardial fluid    - aorta/great vessels: Normal caliber and configuration for age    - misc: No mediastinal mass / significant adenopathy     PLEURAL COMPARTMENT:      - misc: Small bilateral pleural effusions.      MISC:      - inferior neck: Negative    - osseous/body wall: Negative    - subdiaphramatic: As visualized, limited, grossly unremarkable      Impression:      1. Partial consolidation of both lower lobes dependently, with  predominantly upper lobe patchy groundglass opacifications. This  likely represents reported pneumonia. Finding should be followed  up to ensure complete radiographic resolution  2. Small bilateral effusions    Electronically signed by:  Mindy Conley MD  3/28/2023 1:14 PM CDT  Workstation: [id number]    IR Insert Midline Without Port Pump 5 Plus [[id number]] Resulted: 03/25/23 1141     Updated: 03/25/23 1141    Narrative:      This procedure was auto-finalized with no dictation required.    US Guided Vascular Access [[id number]] Collected: 03/25/23 1050     Updated: 03/25/23 1130    Narrative:      PROCEDURE: Ultrasound Guidance Vascular Access      Ordering physician(s): ADA SEXTON    Clinical Indication: Vascular access      Findings:    The right basilic vein was sonographically evaluated and  determined to be patent. Concurrent realtime ultrasound was used  to visualize needle entry into the right basilic vein  for  midline catheter placement and 2 permanent images acquired for  permanent recording and reporting.         Impression:      Impression:   As above.    Electronically signed by:  Eric Smalls MD  3/25/2023 11:28 AM CDT  Workstation: [id number]    XR Abdomen KUB [[id number]] Collected: 03/24/23 1046     Updated: 03/24/23 1145    Narrative:      Procedure: Supine abdomen    Reason for exam: Cortrak placement    FINDINGS: Limited supine abdomen view submitted to evaluate  feeding tube positioning. The feeding tube is seen with tip  within the region of the gastric  antrum.      Impression:      The feeding tube is seen with tip within the region of  the gastric antrum.    Electronically signed by:  Willem Harris MD  3/24/2023 11:42 AM CDT  Workstation: [id number]    XR Chest 1 View [[id number]] Collected: 03/23/23 2343     Updated: 03/24/23 0033    Narrative:      EXAMINATION: XR CHEST 1 VIEW    COMPARISON: Portable chest 7:29 AM the same day    INDICATION: Intubated    FINDINGS: Portable AP imaging of the chest is submitted - 1 view.  Endotracheal tube placement appears satisfactory. A nasogastric  tube is also in place terminating in the stomach. Heart size  remains within normal limits. Both lungs remain somewhat low  volume but appear clear, unchanged. No acute consolidation,  pleural effusion, pneumothorax or pulmonary edema is evident.       Impression:      ET tube and nasogastric tube placement appears  satisfactory. No acute findings.    Electronically signed by:  Carlos Tobar MD  3/24/2023 12:31 AM  CDT Workstation: [id number]    XR Chest 1 View [[id number]] Collected: 03/22/23 0722     Updated: 03/22/23 1613    Narrative:      Chest x-ray single view.       CLINICAL INDICATION: Shortness of breath . Dyspnea    COMPARISON: None    FINDINGS: Cardiac silhouette is normal in size. Pulmonary  vascularity is unremarkable.     No focal infiltrate or consolidation.  No pleural effusion.  No  pneumothorax.      Impression:      No evidence of active disease.    Electronically signed by:  Robin Varghese MD  3/22/2023 4:10 PM CDT  Workstation: [id number]          Chief Complaint on Day of Discharge: sedated, though he is still having periods of agitation despite medicines. His ET tube got dislodged when he was about to be transferred to the [facility] around 1238 hrs because of which he had to have bag mask ventilation for a few moments while the RT reintubated him (writer present in the room to help if needed), STAT chest xray done which showed that the tube needed to be inserted 2 cm  "further down which was done. Afterwards, he was stabilized as his saturations were maintained, he was back on sedatives since he started getting agitated and was eventually transferred upstairs to the [facility]ACH.    Hospital Course:  The patient is a 37 y.o. male who presented to [facility] secondary to nausea, vomiting, syncope, period of hyperventilation after drinking a large amount of alcohol, causing withdrawal for which he was on supportive medicines. Initially wanted to speak with psychiatry to get help for alcohol cessation but got agitated despite being on CIWA medicines and had to be intubated to try to get him stable. Once on the vent, he also had MSSA pneumonia and bacteremia for which he was on IV antibiotics till that was simplified once it was found that he didn't have MRSA. Continued propofol, phenobarb an benzos. Nicotine replacement continued but it was hard to get him extubated and in the interim, he was accepted by [facility] who would work on getting him extubated along with therapy as tolerated. Other concurrent and chronic issues were medically managed. See notes for details  Condition on Discharge:  stable    Physical Exam on Discharge:  /81 (BP Location: Left arm, Patient Position: Lying)   Pulse 98   Temp 99.5 °F (37.5 °C) (Axillary)   Resp 20   Ht 182.9 cm (72\")   Wt 89.6 kg (197 lb 8.5 oz)   SpO2 95%   BMI 26.79 kg/m²   Physical Exam  FiO2 (%):  [30 %] 30 %   Assist control  Ppeak 22  Vt 550 ml  rate 16 /min  PEEP 5.0   Propofol 50 mcg/kg/min  ativan TF @ 50 ml / hr  ETT  NG tube with bridle  bilateral wrist restrains  queen    Physical Exam  Laying in bed, sedated but easily agitated when nursing care is done, intubated  Eyes closed  Mucosae moist  Breathing stable  Heart rate controlled. Normotensive  Soft, abdominal obesity    Discharge Disposition:  Short Term Hospital (DC - External)    Discharge Medications:     Discharge Medications      New " Medications      Instructions Start Date   bisacodyl 5 MG EC tablet  Commonly known as: DULCOLAX   5 mg, Oral, Daily PRN      bisacodyl 10 MG suppository  Commonly known as: DULCOLAX   10 mg, Rectal, Daily PRN      ceFAZolin in 0.9% normal saline 2 GM/ 100 mL solution IVPB  Commonly known as: ANCEF   2 g, Intravenous, Every 8 Hours      docusate sodium 50 mg/5 mL liquid  Commonly known as: COLACE   100 mg, Oral, Daily   Start Date: April 6, 2023     hydrALAZINE 20 MG/ML injection  Commonly known as: APRESOLINE   10 mg, Intravenous, Every 6 Hours PRN      LORazepam 100 mg in sodium chloride 0.9 % 50 mL   0.5-10 mg/hr (0.5-10 mg/hr), Intravenous, Titrated      magnesium hydroxide 2400 MG/10ML suspension suspension  Commonly known as: MILK OF MAGNESIA   10 mL, Oral, Daily   Start Date: April 6, 2023     nicotine 21 MG/24HR patch  Commonly known as: NICODERM CQ   1 patch, Transdermal, Every 24 Hours   Start Date: April 6, 2023     pantoprazole 40 MG injection  Commonly known as: PROTONIX   40 mg, Intravenous, Every Early Morning   Start Date: April 6, 2023     PHENobarbital 32.5 mg in sodium chloride 0.9 % 100 mL IVPB   32.5 mg, Intravenous, Every 8 Hours      polyethylene glycol 17 g packet  Commonly known as: MIRALAX   17 g, Oral, Daily   Start Date: April 6, 2023     propofol 10 mg/mL emulsion infusion  Commonly known as: DIPRIVAN   5-100 mcg/kg/min (427-8,540 mcg/min), Intravenous, Titrated      sennosides-docusate 8.6-50 MG per tablet  Commonly known as: PERICOLACE   2 tablets, Oral, 2 Times Daily         Continue These Medications      Instructions Start Date   atenolol 100 MG tablet  Commonly known as: TENORMIN   100 mg, Oral, Daily         Discharge Diet: tube feeds for now    Activity at Discharge:  as tolerated once extubated    Discharge Care Plan/Instructions: to [facility]    Follow-up Appointments:   No future appointments.    Test Results Pending at Discharge:   Pending Labs     Order Current Status    Blood  Culture - Blood, Arm, Left Preliminary result    Respiratory Culture - Sputum, ET Suction Preliminary result      Time: 1330

## 2023-04-05 NOTE — PLAN OF CARE
Patient VSS. Urine output has been adequate. Patient is sedated/intubated. Propofol and Ativan are infusing. Tylenol given for fever during the night. Patient is resting and calm.

## 2023-04-06 ENCOUNTER — OUTSIDE FACILITY SERVICE (OUTPATIENT)
Dept: PULMONOLOGY | Facility: CLINIC | Age: 38
End: 2023-04-06
Payer: COMMERCIAL

## 2023-04-06 LAB
ALBUMIN SERPL-MCNC: 3.8 G/DL (ref 3.5–5.2)
ALBUMIN/GLOB SERPL: 1.2 G/DL
ALP SERPL-CCNC: 83 U/L (ref 39–117)
ALT SERPL W P-5'-P-CCNC: 19 U/L (ref 1–41)
ANION GAP SERPL CALCULATED.3IONS-SCNC: 14 MMOL/L (ref 5–15)
AST SERPL-CCNC: 19 U/L (ref 1–40)
BILIRUB SERPL-MCNC: 0.2 MG/DL (ref 0–1.2)
BUN SERPL-MCNC: 12 MG/DL (ref 6–20)
BUN/CREAT SERPL: 18.8 (ref 7–25)
CALCIUM SPEC-SCNC: 9.3 MG/DL (ref 8.6–10.5)
CHLORIDE SERPL-SCNC: 105 MMOL/L (ref 98–107)
CO2 SERPL-SCNC: 22 MMOL/L (ref 22–29)
CREAT SERPL-MCNC: 0.64 MG/DL (ref 0.76–1.27)
DEPRECATED RDW RBC AUTO: 53 FL (ref 37–54)
EGFRCR SERPLBLD CKD-EPI 2021: 125.8 ML/MIN/1.73
ERYTHROCYTE [DISTWIDTH] IN BLOOD BY AUTOMATED COUNT: 14.5 % (ref 12.3–15.4)
GLOBULIN UR ELPH-MCNC: 3.1 GM/DL
GLUCOSE BLDC GLUCOMTR-MCNC: 114 MG/DL (ref 70–130)
GLUCOSE BLDC GLUCOMTR-MCNC: 88 MG/DL (ref 70–130)
GLUCOSE BLDC GLUCOMTR-MCNC: 93 MG/DL (ref 70–130)
GLUCOSE BLDC GLUCOMTR-MCNC: 94 MG/DL (ref 70–130)
GLUCOSE SERPL-MCNC: 122 MG/DL (ref 65–99)
HCT VFR BLD AUTO: 37 % (ref 37.5–51)
HGB BLD-MCNC: 12.2 G/DL (ref 13–17.7)
MAGNESIUM SERPL-MCNC: 2.3 MG/DL (ref 1.6–2.6)
MCH RBC QN AUTO: 33.1 PG (ref 26.6–33)
MCHC RBC AUTO-ENTMCNC: 33 G/DL (ref 31.5–35.7)
MCV RBC AUTO: 100.3 FL (ref 79–97)
PLATELET # BLD AUTO: 123 10*3/MM3 (ref 140–450)
PMV BLD AUTO: 10.5 FL (ref 6–12)
POTASSIUM SERPL-SCNC: 3.8 MMOL/L (ref 3.5–5.2)
PROT SERPL-MCNC: 6.9 G/DL (ref 6–8.5)
RBC # BLD AUTO: 3.69 10*6/MM3 (ref 4.14–5.8)
SODIUM SERPL-SCNC: 141 MMOL/L (ref 136–145)
WBC NRBC COR # BLD: 11.42 10*3/MM3 (ref 3.4–10.8)

## 2023-04-06 PROCEDURE — 82962 GLUCOSE BLOOD TEST: CPT

## 2023-04-06 PROCEDURE — 97162 PT EVAL MOD COMPLEX 30 MIN: CPT

## 2023-04-06 PROCEDURE — 85027 COMPLETE CBC AUTOMATED: CPT | Performed by: INTERNAL MEDICINE

## 2023-04-06 PROCEDURE — 83735 ASSAY OF MAGNESIUM: CPT | Performed by: INTERNAL MEDICINE

## 2023-04-06 PROCEDURE — 80053 COMPREHEN METABOLIC PANEL: CPT | Performed by: INTERNAL MEDICINE

## 2023-04-06 PROCEDURE — 25010000002 ENOXAPARIN PER 10 MG: Performed by: INTERNAL MEDICINE

## 2023-04-06 PROCEDURE — 25010000002 PHENOBARBITAL PER 120 MG: Performed by: INTERNAL MEDICINE

## 2023-04-06 PROCEDURE — 25010000002 CEFAZOLIN PER 500 MG: Performed by: INTERNAL MEDICINE

## 2023-04-06 PROCEDURE — 25010000002 THIAMINE PER 100 MG

## 2023-04-06 PROCEDURE — 25010000002 LORAZEPAM PER 2 MG: Performed by: INTERNAL MEDICINE

## 2023-04-06 PROCEDURE — 25010000002 PROPOFOL 10 MG/ML EMULSION: Performed by: INTERNAL MEDICINE

## 2023-04-06 NOTE — PAYOR COMM NOTE
"Senia Murphy  [facility]  Case Management Extender  [phone] phone  [phone] fax      Auth# [id number]     Juan A Sky \"Ti\" (37 y.o. Male)     Date of Birth   1985    Social Security Number       Address   [address] [facility] [facility]    Home Phone   [phone]    MRN   [id number]       Congregational   [facility]    Marital Status                               Admission Date   3/22/23    Admission Type   Urgent    Admitting Provider   Harshal Jackson MD    Attending Provider       Department, Room/Bed   [facility] CRITICAL CARE STEPDOWN, 19/A       Discharge Date   4/5/2023    Discharge Disposition   [facility] (DC - External)    Discharge Destination                               Attending Provider: (none)   Allergies: Penicillins, Ibuprofen    Isolation: None   Infection: None   Code Status: CPR    Ht: 182.9 cm (72\")   Wt: 89.6 kg (197 lb 8.5 oz)    Admission Cmt: None   Principal Problem: Acute respiratory failure with hypoxia and hypercapnia [J96.01,J96.02]                 Active Insurance as of 3/22/2023     Primary Coverage     Payor Plan Insurance Group Employer/Plan Group    [employer] [place] [employer] [place]ICA[place]      Payor Plan Address Payor Plan Phone Number Payor Plan Fax Number Effective Dates    [address] [address] [phone]  3/21/2023 - None Entered    [facility] [address]       Subscriber Name Subscriber Birth Date Member ID       JUAN A SKY 1985 [id number]                 Emergency Contacts      (Rel.) Home Phone Work Phone Mobile Phone    CHRISSIE SKY (Spouse) -- -- [phone]               Discharge Summary      Nikki Stallings MD at 04/05/23 0944              [facility] Medicine Services  DISCHARGE SUMMARY       Date of Admission: 3/22/2023  Date of Discharge:  4/5/2023  Primary Care Physician: Brandon Darling MD    Presenting " Problem:  Alcohol withdrawal [F10.939]     Final Discharge Diagnoses:  Active Hospital Problems    Diagnosis    • **Acute respiratory failure with hypoxia and hypercapnia    • Fever    • Staphylococcus aureus pneumonia    • Delirium tremens    • Anxiety disorder    • Affective disorder    • Alcohol withdrawal    • Transaminitis    HPI: nausea, vomiting, syncope    Consults:   Consults     Date and Time Order Name Status Description    3/23/2023 10:41 AM Inpatient Psychiatrist Consult Completed       Procedures Performed:      Endotracheal intubation  Pertinent Test Results:   Lab Results (most recent)     Procedure Component Value Units Date/Time    Comprehensive Metabolic Panel [[id number]]  (Abnormal) Collected: 04/05/23 0630    Specimen: Blood Updated: 04/05/23 0701     Glucose 100 mg/dL      BUN 12 mg/dL      Creatinine 0.58 mg/dL      Sodium 140 mmol/L      Potassium 3.6 mmol/L      Chloride 104 mmol/L      CO2 24.0 mmol/L      Calcium 9.4 mg/dL      Total Protein 7.0 g/dL      Albumin 3.9 g/dL      ALT (SGPT) 22 U/L      AST (SGOT) 18 U/L      Alkaline Phosphatase 86 U/L      Total Bilirubin 0.2 mg/dL      Globulin 3.1 gm/dL      A/G Ratio 1.3 g/dL      BUN/Creatinine Ratio 20.7     Anion Gap 12.0 mmol/L      eGFR 129.6 mL/min/1.73     Narrative:      GFR Normal >60  Chronic Kidney Disease <60  Kidney Failure <15      C-reactive Protein [[id number]]  (Abnormal) Collected: 04/05/23 0630    Specimen: Blood Updated: 04/05/23 0701     C-Reactive Protein 2.51 mg/dL     CBC & Differential [[id number]]  (Abnormal) Collected: 04/05/23 0630    Specimen: Blood Updated: 04/05/23 0644    Narrative:      The following orders were created for panel order CBC & Differential.  Procedure                               Abnormality         Status                     ---------                               -----------         ------                     CBC Auto Differential[[id number]]        Abnormal            Final result                  Please view results for these tests on the individual orders.    CBC Auto Differential [[id number]]  (Abnormal) Collected: 04/05/23 0630    Specimen: Blood Updated: 04/05/23 0644     WBC 12.16 10*3/mm3      RBC 3.57 10*6/mm3      Hemoglobin 12.1 g/dL      Hematocrit 35.3 %      MCV 98.9 fL      MCH 33.9 pg      MCHC 34.3 g/dL      RDW 14.6 %      RDW-SD 52.9 fl      MPV 10.4 fL      Platelets 167 10*3/mm3      Neutrophil % 69.5 %      Lymphocyte % 14.4 %      Monocyte % 9.1 %      Eosinophil % 2.1 %      Basophil % 1.0 %      Immature Grans % 3.9 %      Neutrophils, Absolute 8.45 10*3/mm3      Lymphocytes, Absolute 1.75 10*3/mm3      Monocytes, Absolute 1.11 10*3/mm3      Eosinophils, Absolute 0.26 10*3/mm3      Basophils, Absolute 0.12 10*3/mm3      Immature Grans, Absolute 0.47 10*3/mm3      nRBC 0.0 /100 WBC     POC Glucose Once [[id number]]  (Normal) Collected: 04/05/23 0544    Specimen: Blood Updated: 04/05/23 0603     Glucose 92 mg/dL      Comment: : [phone] RITU ANTHONYMeter ID: [id number]       POC Glucose Once [[id number]]  (Normal) Collected: 04/05/23 0050    Specimen: Blood Updated: 04/05/23 0603     Glucose 104 mg/dL      Comment: : [phone] RITU ANTHONYMeter ID: [id number]       Blood Culture - Blood, Arm, Left [[id number]]  (Normal) Collected: 04/02/23 0920    Specimen: Blood from Arm, Left Updated: 04/04/23 0945     Blood Culture No growth at 2 days    Respiratory Culture - Sputum, ET Suction [[id number]]  (Abnormal) Collected: 04/03/23 0719    Specimen: Sputum from ET Suction Updated: 04/04/23 0929     Respiratory Culture Scant growth (1+) Staphylococcus aureus     Comment: Refer to Respiratory Culture from 4/2/23 for MICS           Rare Normal Respiratory Sangita     Gram Stain Moderate (3+) WBCs per low power field      Rare (1+) Epithelial cells per low power field      Mixed bacterial sangita    Respiratory Culture - Sputum, ET Suction [[id number]]  (Abnormal)   (Susceptibility) Collected: 04/02/23 1047    Specimen: Sputum from ET Suction Updated: 04/04/23 0830     Respiratory Culture Moderate growth (3+) Staphylococcus aureus      Light growth (2+) Normal Respiratory Derrick     Gram Stain Many (4+) WBCs per low power field      Rare (1+) Epithelial cells per low power field      Mixed bacterial derrick    Susceptibility      Staphylococcus aureus      BOONE      Clindamycin Resistant     Inducible Clindamycin Resistance Positive      Oxacillin Susceptible      Tetracycline Susceptible      Trimethoprim + Sulfamethoxazole Susceptible      Vancomycin Susceptible                       Susceptibility Comments     Staphylococcus aureus    This isolate is presumed to be clindamycin resistant based on detection of inducible clindamycin resistance.  Clindamycin may still be effective in some patients.  This isolate is presumed to be clindamycin resistant based on detection of inducible clindamycin resistance.  Clindamycin may still be effective in some patients.  This isolate is presumed to be clindamycin resistant based on detection of inducible clindamycin resistance.  Clindamycin may still be effective in some patients.             Blood Culture - Blood, Arm, Right [[id number]]  (Abnormal) Collected: 04/02/23 0919    Specimen: Blood from Arm, Right Updated: 04/04/23 0631     Blood Culture Staphylococcus, coagulase negative     Isolated from Aerobic and Anaerobic Bottles     Gram Stain Aerobic Bottle Gram positive cocci in clusters      Anaerobic Bottle Gram positive cocci in clusters     Comment: 2 bottles of 4 drawn positive for gram positive cocci in cluesters       Narrative:      Probable contaminant requires clinical correlation, susceptibility not performed unless requested by physician.  1 of 4 bottles drawn positive for gram positive cocci    Blood Culture ID, PCR - Blood, Arm, Right [[id number]]  (Abnormal) Collected: 04/02/23 0919    Specimen: Blood from Arm, Right Updated:  "04/03/23 1433     BCID, PCR Staph spp, not aureus or lugdunensis. Identification by BCID2 PCR.    Urinalysis With Culture If Indicated - Indwelling Urethral Catheter [[id number]]  (Normal) Collected: 04/03/23 0817    Specimen: Urine from Indwelling Urethral Catheter Updated: 04/03/23 0847     Color, UA Yellow     Appearance, UA Clear     pH, UA 7.0     Specific Gravity, UA 1.014     Glucose, UA Negative     Ketones, UA Negative     Bilirubin, UA Negative     Blood, UA Negative     Protein, UA Negative     Leuk Esterase, UA Negative     Nitrite, UA Negative     Urobilinogen, UA 1.0 E.U./dL    Narrative:      In absence of clinical symptoms, the presence of pyuria, bacteria, and/or nitrites on the urinalysis result does not correlate with infection.  Urine microscopic not indicated.    Procalcitonin [[id number]]  (Normal) Collected: 04/03/23 0659    Specimen: Blood Updated: 04/03/23 0732     Procalcitonin 0.09 ng/mL     Narrative:      As a Marker for Sepsis (Non-Neonates):    1. <0.5 ng/mL represents a low risk of severe sepsis and/or septic shock.  2. >2 ng/mL represents a high risk of severe sepsis and/or septic shock.    As a Marker for Lower Respiratory Tract Infections that require antibiotic therapy:    PCT on Admission    Antibiotic Therapy       6-12 Hrs later    >0.5                Strongly Recommended  >0.25 - <0.5        Recommended   0.1 - 0.25          Discouraged              Remeasure/reassess PCT  <0.1                Strongly Discouraged     Remeasure/reassess PCT    As 28 day mortality risk marker: \"Change in Procalcitonin Result\" (>80% or <=80%) if Day 0 (or Day 1) and Day 4 values are available. Refer to http://www.[facility]-pct-calculator.com    Change in PCT <=80%  A decrease of PCT levels below or equal to 80% defines a positive change in PCT test result representing a higher risk for 28-day all-cause mortality of patients diagnosed with severe sepsis for septic shock.    Change in PCT >80%  A " decrease of PCT levels of more than 80% defines a negative change in PCT result representing a lower risk for 28-day all-cause mortality of patients diagnosed with severe sepsis or septic shock.       Lactic Acid, Plasma [[id number]]  (Normal) Collected: 04/03/23 0659    Specimen: Blood Updated: 04/03/23 0719     Lactate 1.0 mmol/L     Comprehensive Metabolic Panel [[id number]]  (Abnormal) Collected: 04/03/23 0528    Specimen: Blood Updated: 04/03/23 0603     Glucose 112 mg/dL      BUN 14 mg/dL      Creatinine 0.77 mg/dL      Sodium 142 mmol/L      Potassium 3.9 mmol/L      Chloride 106 mmol/L      CO2 23.0 mmol/L      Calcium 9.4 mg/dL      Total Protein 7.0 g/dL      Albumin 3.8 g/dL      ALT (SGPT) 28 U/L      AST (SGOT) 31 U/L      Alkaline Phosphatase 95 U/L      Total Bilirubin 0.2 mg/dL      Globulin 3.2 gm/dL      A/G Ratio 1.2 g/dL      BUN/Creatinine Ratio 18.2     Anion Gap 13.0 mmol/L      eGFR 119.0 mL/min/1.73     Narrative:      GFR Normal >60  Chronic Kidney Disease <60  Kidney Failure <15      CBC & Differential [[id number]]  (Abnormal) Collected: 04/03/23 0528    Specimen: Blood Updated: 04/03/23 0551    Narrative:      The following orders were created for panel order CBC & Differential.  Procedure                               Abnormality         Status                     ---------                               -----------         ------                     CBC Auto Differential[[id number]]        Abnormal            Final result               Scan Slide[[id number]]                                                                    Please view results for these tests on the individual orders.    CBC Auto Differential [[id number]]  (Abnormal) Collected: 04/03/23 0528    Specimen: Blood Updated: 04/03/23 0551     WBC 14.80 10*3/mm3      RBC 3.74 10*6/mm3      Hemoglobin 12.6 g/dL      Hematocrit 38.0 %      .6 fL      MCH 33.7 pg      MCHC 33.2 g/dL      RDW 14.7 %      RDW-SD 55.4 fl      MPV  10.0 fL      Platelets 340 10*3/mm3      Neutrophil % 73.4 %      Lymphocyte % 9.7 %      Monocyte % 9.6 %      Eosinophil % 1.6 %      Basophil % 1.3 %      Immature Grans % 4.4 %      Neutrophils, Absolute 10.88 10*3/mm3      Lymphocytes, Absolute 1.43 10*3/mm3      Monocytes, Absolute 1.42 10*3/mm3      Eosinophils, Absolute 0.23 10*3/mm3      Basophils, Absolute 0.19 10*3/mm3      Immature Grans, Absolute 0.65 10*3/mm3      nRBC 0.1 /100 WBC     Urinalysis, Microscopic Only - Indwelling Urethral Catheter [[id number]]  (Abnormal) Collected: 04/02/23 0952    Specimen: Urine from Indwelling Urethral Catheter Updated: 04/02/23 1037     RBC, UA 0-2 /HPF      WBC, UA 3-5 /HPF      Comment: Urine culture not indicated.        Bacteria, UA Trace /HPF      Squamous Epithelial Cells, UA None Seen /HPF      Hyaline Casts, UA None Seen /LPF      Granular Casts, UA 0-2 /LPF      Methodology Manual Light Microscopy    Urinalysis With Culture If Indicated - Indwelling Urethral Catheter [[id number]]  (Abnormal) Collected: 04/02/23 0952    Specimen: Urine from Indwelling Urethral Catheter Updated: 04/02/23 1014     Color, UA Dark Yellow     Appearance, UA Clear     pH, UA 5.5     Specific [place], UA 1.036     Comment: Result obtained by Refractometer        Glucose, UA Negative     Ketones, UA Trace     Bilirubin, UA Negative     Blood, UA Negative     Protein, UA Trace     Leuk Esterase, UA Trace     Nitrite, UA Negative     Urobilinogen, UA 1.0 E.U./dL    Narrative:      In absence of clinical symptoms, the presence of pyuria, bacteria, and/or nitrites on the urinalysis result does not correlate with infection.    Magnesium [[id number]]  (Normal) Collected: 04/02/23 0525    Specimen: Blood Updated: 04/02/23 0551     Magnesium 2.2 mg/dL     Blood Gas, Arterial - [[id number]]  (Abnormal) Collected: 04/01/23 0411    Specimen: Arterial Blood Updated: 04/01/23 0412     Site Left Brachial     Kush's Test N/A     pH, Arterial 7.474 pH  units      Comment: 83 Value above reference range        pCO2, Arterial 36.1 mm Hg      pO2, Arterial 65.2 mm Hg      Comment: 84 Value below reference range        HCO3, Arterial 26.5 mmol/L      Comment: 83 Value above reference range        Base Excess, Arterial 2.9 mmol/L      Comment: 83 Value above reference range        O2 Saturation, Arterial 94.1 %      Barometric Pressure for Blood Gas 738 mmHg      Modality Ventilator     FIO2 30 %      Ventilator Mode NA     Set Tidal Volume 550     Set Mech Resp Rate 16.0     PEEP 5.0     Collected by júnior     Comment: Meter: [id number]     :  [id number]       Basic Metabolic Panel [[id number]]  (Abnormal) Collected: 03/31/23 0734    Specimen: Blood Updated: 03/31/23 0812     Glucose 111 mg/dL      BUN 12 mg/dL      Creatinine 0.71 mg/dL      Sodium 141 mmol/L      Potassium 3.8 mmol/L      Chloride 106 mmol/L      CO2 24.0 mmol/L      Calcium 8.9 mg/dL      BUN/Creatinine Ratio 16.9     Anion Gap 11.0 mmol/L      eGFR 121.9 mL/min/1.73     Narrative:      GFR Normal >60  Chronic Kidney Disease <60  Kidney Failure <15      Magnesium [[id number]]  (Normal) Collected: 03/31/23 0734    Specimen: Blood Updated: 03/31/23 0807     Magnesium 2.2 mg/dL     CBC (No Diff) [[id number]]  (Abnormal) Collected: 03/29/23 0944    Specimen: Blood Updated: 03/29/23 0955     WBC 8.60 10*3/mm3      RBC 3.16 10*6/mm3      Hemoglobin 10.8 g/dL      Hematocrit 32.0 %      .3 fL      MCH 34.2 pg      MCHC 33.8 g/dL      RDW 14.6 %      RDW-SD 54.7 fl      MPV 10.4 fL      Platelets 267 10*3/mm3     Basic Metabolic Panel [[id number]]  (Abnormal) Collected: 03/28/23 0512    Specimen: Blood Updated: 03/28/23 0539     Glucose 104 mg/dL      BUN 5 mg/dL      Creatinine 0.75 mg/dL      Sodium 138 mmol/L      Potassium 3.8 mmol/L      Chloride 104 mmol/L      CO2 25.0 mmol/L      Calcium 8.7 mg/dL      BUN/Creatinine Ratio 6.7     Anion Gap 9.0 mmol/L      eGFR 119.9 mL/min/1.73      "Narrative:      GFR Normal >60  Chronic Kidney Disease <60  Kidney Failure <15      Urinalysis, Microscopic Only - Indwelling Urethral Catheter [[id number]]  (Abnormal) Collected: 03/27/23 1459    Specimen: Urine from Indwelling Urethral Catheter Updated: 03/27/23 1518     RBC, UA 3-5 /HPF      WBC, UA 0-2 /HPF      Comment: Urine culture not indicated.        Bacteria, UA None Seen /HPF      Squamous Epithelial Cells, UA None Seen /HPF      Hyaline Casts, UA None Seen /LPF      Methodology Automated Microscopy    Procalcitonin [[id number]]  (Normal) Collected: 03/27/23 1311    Specimen: Blood Updated: 03/27/23 1354     Procalcitonin 0.12 ng/mL     Narrative:      As a Marker for Sepsis (Non-Neonates):    1. <0.5 ng/mL represents a low risk of severe sepsis and/or septic shock.  2. >2 ng/mL represents a high risk of severe sepsis and/or septic shock.    As a Marker for Lower Respiratory Tract Infections that require antibiotic therapy:    PCT on Admission    Antibiotic Therapy       6-12 Hrs later    >0.5                Strongly Recommended  >0.25 - <0.5        Recommended   0.1 - 0.25          Discouraged              Remeasure/reassess PCT  <0.1                Strongly Discouraged     Remeasure/reassess PCT    As 28 day mortality risk marker: \"Change in Procalcitonin Result\" (>80% or <=80%) if Day 0 (or Day 1) and Day 4 values are available. Refer to http://www.[employer]-pct-calculator.com    Change in PCT <=80%  A decrease of PCT levels below or equal to 80% defines a positive change in PCT test result representing a higher risk for 28-day all-cause mortality of patients diagnosed with severe sepsis for septic shock.    Change in PCT >80%  A decrease of PCT levels of more than 80% defines a negative change in PCT result representing a lower risk for 28-day all-cause mortality of patients diagnosed with severe sepsis or septic shock.       Lactic Acid, Plasma [[id number]]  (Normal) Collected: 03/27/23 1311    Specimen: " Blood from Arm, Left Updated: 03/27/23 1342     Lactate 1.3 mmol/L     Potassium [[id number]]  (Normal) Collected: 03/27/23 1218    Specimen: Blood Updated: 03/27/23 1245     Potassium 4.2 mmol/L     Blood Gas, Arterial - [[id number]]  (Abnormal) Collected: 03/27/23 0428    Specimen: Arterial Blood Updated: 03/27/23 0430     Site Left Radial     Kush's Test N/A     pH, Arterial 7.431 pH units      pCO2, Arterial 39.6 mm Hg      pO2, Arterial 84.7 mm Hg      HCO3, Arterial 26.3 mmol/L      Comment: 83 Value above reference range        Base Excess, Arterial 1.9 mmol/L      O2 Saturation, Arterial 97.7 %      Barometric Pressure for Blood Gas 746 mmHg      Modality Ventilator     FIO2 30 %      Ventilator Mode AC     Set Tidal Volume 560     Set Mech Resp Rate 16.0     PEEP 5.0     Collected by      Comment: Meter: [id number]     :  [id number]       Potassium [[id number]]  (Normal) Collected: 03/24/23 2221    Specimen: Blood Updated: 03/24/23 2247     Potassium 4.8 mmol/L      Comment: Slight hemolysis detected by analyzer. Results may be affected.       Hepatitis B Surface Antibody [[id number]]  (Normal) Collected: 03/23/23 2345    Specimen: Blood Updated: 03/24/23 1245     Hep B S Ab Non-Reactive    Narrative:      Non-Reactive - Individual is considered to be not immune to infection with HBV.    Equivocal - Unable to determine if anti-HBs is present at levels consistent with immunity. The individual should be further assessed by associated risk factors and the use of additional diagnositc information, or another sample may be collected and tested.    Reactive - Anti-HBs concentration detected at >10 mIU/mL. Individual is considered to be immune to infection with HBV.      Results may be falsely decreased if patient taking Biotin.      Extra Tubes [[id number]] Collected: 03/24/23 0453    Specimen: Blood, Venous Line Updated: 03/24/23 0601    Narrative:      The following orders were created for panel order  Extra Tubes.  Procedure                               Abnormality         Status                     ---------                               -----------         ------                     Lavender Top[[id number]]                                     Final result                 Please view results for these tests on the individual orders.    Lavender Top [[id number]] Collected: 03/24/23 0453    Specimen: Blood Updated: 03/24/23 0601     Extra Tube hold for add-on     Comment: Auto resulted       Hepatitis C Antibody [[id number]]  (Normal) Collected: 03/23/23 2349    Specimen: Blood Updated: 03/24/23 0336     Hepatitis C Ab Non-Reactive    Narrative:      Results may be falsely decreased if patient taking Biotin.      HIV-1 / O / 2 Ag / Antibody 4th Generation [[id number]]  (Normal) Collected: 03/23/23 2349    Specimen: Blood Updated: 03/24/23 0336     HIV-1/ HIV-2 Non-Reactive    Narrative:      The HIV antibody/antigen combo assay is a qualitative assay for HIV that includes the p24 antigen as well as antibodies to HIV types 1 and 2. This test is intended to be used as a screening assay in the diagnosis of HIV infection in patients over the age of 2.    Hepatitis B Surface Antigen [[id number]]  (Normal) Collected: 03/23/23 2345    Specimen: Blood Updated: 03/24/23 0326     Hepatitis B Surface Ag Non-Reactive    CBC (No Diff) [[id number]]  (Abnormal) Collected: 03/23/23 2345    Specimen: Blood Updated: 03/23/23 2352     WBC 5.28 10*3/mm3      RBC 3.78 10*6/mm3      Hemoglobin 13.0 g/dL      Hematocrit 37.5 %      MCV 99.2 fL      MCH 34.4 pg      MCHC 34.7 g/dL      RDW 13.6 %      RDW-SD 49.9 fl      MPV 10.3 fL      Platelets 183 10*3/mm3     Urine Drug Screen - Urine, Clean Catch [[id number]]  (Abnormal) Collected: 03/22/23 0843    Specimen: Urine, Clean Catch Updated: 03/22/23 0856     THC, Screen, Urine Positive     Phencyclidine (PCP), Urine Negative     Cocaine Screen, Urine Negative     Methamphetamine, Ur Negative      Opiate Screen Negative     Amphetamine Screen, Urine Negative     Benzodiazepine Screen, Urine Positive     Tricyclic Antidepressants Screen Negative     Methadone Screen, Urine Negative     Barbiturates Screen, Urine Positive     Oxycodone Screen, Urine Negative     Propoxyphene Screen Negative     Buprenorphine, Screen, Urine Negative    Narrative:      Cutoff For Drugs Screened:    Amphetamines               500 ng/ml  Barbiturates               200 ng/ml  Benzodiazepines            150 ng/ml  Cocaine                    150 ng/ml  Methadone                  200 ng/ml  Opiates                    100 ng/ml  Phencyclidine               25 ng/ml  THC                            50 ng/ml  Methamphetamine            500 ng/ml  Tricyclic Antidepressants  300 ng/ml  Oxycodone                  100 ng/ml  Propoxyphene               300 ng/ml  Buprenorphine               10 ng/ml    The normal value for all drugs tested is negative. This report includes unconfirmed screening results, with the cutoff values listed, to be used for medical treatment purposes only.  Unconfirmed results must not be used for non-medical purposes such as employment or legal testing.  Clinical consideration should be applied to any drug of abuse test, particularly when unconfirmed results are used.      High Sensitivity Troponin T 2Hr [[id number]]  (Normal) Collected: 03/22/23 0726    Specimen: Blood Updated: 03/22/23 0757     HS Troponin T 7 ng/L      Troponin T Delta 0 ng/L     Narrative:      High Sensitive Troponin T Reference Range:  <10.0 ng/L- Negative Female for AMI  <15.0 ng/L- Negative Male for AMI  >=10 - Abnormal Female indicating possible myocardial injury.  >=15 - Abnormal Male indicating possible myocardial injury.   Clinicians would have to utilize clinical acumen, EKG, Troponin, and serial changes to determine if it is an Acute Myocardial Infarction or myocardial injury due to an underlying chronic condition.         High  Sensitivity Troponin T [[id number]]  (Normal) Collected: 03/22/23 0345    Specimen: Blood Updated: 03/22/23 0719     HS Troponin T 7 ng/L     Narrative:      High Sensitive Troponin T Reference Range:  <10.0 ng/L- Negative Female for AMI  <15.0 ng/L- Negative Male for AMI  >=10 - Abnormal Female indicating possible myocardial injury.  >=15 - Abnormal Male indicating possible myocardial injury.   Clinicians would have to utilize clinical acumen, EKG, Troponin, and serial changes to determine if it is an Acute Myocardial Infarction or myocardial injury due to an underlying chronic condition.         Ethanol [[id number]] Collected: 03/22/23 0345    Specimen: Blood Updated: 03/22/23 0415     Ethanol <10 mg/dL      Ethanol % <0.010 %     Acetaminophen Level [[id number]]  (Normal) Collected: 03/22/23 0345    Specimen: Blood Updated: 03/22/23 0415     Acetaminophen <5.0 mcg/mL     Salicylate Level [[id number]]  (Normal) Collected: 03/22/23 0345    Specimen: Blood Updated: 03/22/23 0415     Salicylate <0.3 mg/dL         Imaging Results (Most Recent)     Procedure Component Value Units Date/Time    XR Chest 1 View [[id number]] Collected: 04/03/23 0442     Updated: 04/03/23 0519    Narrative:      EXAM: Single portable XR view of the chest  INDICATION: respiratory failure  COMPARISON: 4/2/2023 radiograph    FINDINGS:  Support lines and devices are stable in position.  The cardiomediastinal silhouette is stable.   Slightly increased pulmonary vascular congestive changes. No  large pleural effusion, visible pneumothorax, or focal  consolidation.      Impression:        Slightly increased pulmonary vascular congestive changes.    Electronically signed by:  Jose Maria Smith MD  4/3/2023 5:17 AM  CDT Workstation: [id number]    XR Chest 1 View [[id number]] Collected: 04/02/23 0912     Updated: 04/02/23 0935    Narrative:      EXAM: Chest, 1 AP view  CLINICAL INDICATION: Intubated, fever    COMPARISON: Chest radiographs  3/23/2023    FINDINGS:    There is Dobbhoff tube coursing through the esophagus.  There is endotracheal tube 4.9 cm above levi.    There are stable mild perihilar and basilar interstitial changes,  as well as possible slight blunting of left costophrenic angle.    There is no pneumothorax or interval cardiomegaly.    There is no acute or aggressive osseous lesion.      Impression:      CONCLUSION:     1. Stable appearing endotracheal tube position.    2. Stable mild bilateral interstitial changes and possible small  left-sided pleural effusion. See report for other details.    Electronically signed by:  Kyrie Crabtree DO  4/2/2023 9:33 AM CDT  Workstation: [id number]    CT Chest Without Contrast Diagnostic [[id number]] Collected: 03/27/23 1549     Updated: 03/28/23 1316    Narrative:      PROCEDURE: CT CHEST WO CONTRAST DIAGNOSTIC    INDICATION: Pneumonia    COMPARISON: Chest x-ray 2/23/2023     TECHNIQUE:    - reconstructions: Axial, coronal, sagittal    - contrast:  oral:  None                       intravenous: None    This exam was performed according to our departmental  dose-optimization program, which includes automated exposure  control, adjustment of the mA and/or kV according to patient size  and/or use of iterative reconstruction technique.  DLP is 1195.8    FINDINGS:  Endotracheal tube terminates the level of the clavicular heads.  Enteric tube is incompletely included in the study but seen at  least as far distally as the gastroduodenal junction    PULMONARY PARENCHYMA:      -Hepatic groundglass opacities in the upper lobes bilaterally.  There is partial consolidation of the lower lobes dependently,  left greater than right.     MEDIASTINUM / PADMINI:      - heart: Normal size, no pericardial fluid    - aorta/great vessels: Normal caliber and configuration for age    - misc: No mediastinal mass / significant adenopathy     PLEURAL COMPARTMENT:      - misc: Small bilateral pleural effusions.      MISC:      -  inferior neck: Negative    - osseous/body wall: Negative    - subdiaphramatic: As visualized, limited, grossly unremarkable      Impression:      1. Partial consolidation of both lower lobes dependently, with  predominantly upper lobe patchy groundglass opacifications. This  likely represents reported pneumonia. Finding should be followed  up to ensure complete radiographic resolution  2. Small bilateral effusions    Electronically signed by:  Mindy Conley MD  3/28/2023 1:14 PM CDT  Workstation: [id number]    IR Insert Midline Without Port Pump 5 Plus [[id number]] Resulted: 03/25/23 1141     Updated: 03/25/23 1141    Narrative:      This procedure was auto-finalized with no dictation required.    US Guided Vascular Access [[id number]] Collected: 03/25/23 1050     Updated: 03/25/23 1130    Narrative:      PROCEDURE: Ultrasound Guidance Vascular Access      Ordering physician(s): ADA SEXTON    Clinical Indication: Vascular access      Findings:    The right basilic vein was sonographically evaluated and  determined to be patent. Concurrent realtime ultrasound was used  to visualize needle entry into the right basilic vein  for  midline catheter placement and 2 permanent images acquired for  permanent recording and reporting.         Impression:      Impression:   As above.    Electronically signed by:  Eric Smalls MD  3/25/2023 11:28 AM CDT  Workstation: [id number]    XR Abdomen KUB [[id number]] Collected: 03/24/23 1046     Updated: 03/24/23 1145    Narrative:      Procedure: Supine abdomen    Reason for exam: Cortrak placement    FINDINGS: Limited supine abdomen view submitted to evaluate  feeding tube positioning. The feeding tube is seen with tip  within the region of the gastric antrum.      Impression:      The feeding tube is seen with tip within the region of  the gastric antrum.    Electronically signed by:  Willem Harris MD  3/24/2023 11:42 AM CDT  Workstation: [id number]    XR Chest 1 View [[id number]]  Collected: 03/23/23 2343     Updated: 03/24/23 0033    Narrative:      EXAMINATION: XR CHEST 1 VIEW    COMPARISON: Portable chest 7:29 AM the same day    INDICATION: Intubated    FINDINGS: Portable AP imaging of the chest is submitted - 1 view.  Endotracheal tube placement appears satisfactory. A nasogastric  tube is also in place terminating in the stomach. Heart size  remains within normal limits. Both lungs remain somewhat low  volume but appear clear, unchanged. No acute consolidation,  pleural effusion, pneumothorax or pulmonary edema is evident.       Impression:      ET tube and nasogastric tube placement appears  satisfactory. No acute findings.    Electronically signed by:  Carlos Tobar MD  3/24/2023 12:31 AM  CDT Workstation: [id number]    XR Chest 1 View [[id number]] Collected: 03/22/23 0722     Updated: 03/22/23 1613    Narrative:      Chest x-ray single view.       CLINICAL INDICATION: Shortness of breath . Dyspnea    COMPARISON: None    FINDINGS: Cardiac silhouette is normal in size. Pulmonary  vascularity is unremarkable.     No focal infiltrate or consolidation.  No pleural effusion.  No  pneumothorax.      Impression:      No evidence of active disease.    Electronically signed by:  Robin Varghese MD  3/22/2023 4:10 PM CDT  Workstation: [id number]          Chief Complaint on Day of Discharge: sedated, though he is still having periods of agitation despite medicines. His ET [facility] because of which he had to have bag mask ventilation for a few moments while the RT reintubated him (writer present in the room to help if needed), STAT chest xray done which showed that the tube needed to be inserted 2 cm further down which was done. Afterwards, he was stabilized as his saturations were maintained, he was back on sedatives since he started getting agitated and was eventually transferred upstairs to the [facility].    Hospital Course:  The  "patient is a 37 y.o. male who presented to [facility] secondary to nausea, vomiting, syncope, period of hyperventilation after drinking a large amount of alcohol, causing withdrawal for which he was on supportive medicines. Initially wanted to speak with psychiatry to get help for alcohol cessation but got agitated despite being on CIWA medicines and had to be intubated to try to get him stable. Once on the vent, he also had MSSA pneumonia and bacteremia for which he was on IV antibiotics till that was simplified once it was found that he didn't have MRSA. Continued propofol, phenobarb an benzos. Nicotine replacement continued but it was hard to get him extubated and in the interim, he was accepted by [facility] who would work on getting him extubated along with therapy as tolerated. Other concurrent and chronic issues were medically managed. See notes for details  Condition on Discharge:  stable    Physical Exam on Discharge:  /81 (BP Location: Left arm, Patient Position: Lying)   Pulse 98   Temp 99.5 °F (37.5 °C) (Axillary)   Resp 20   Ht 182.9 cm (72\")   Wt 89.6 kg (197 lb 8.5 oz)   SpO2 95%   BMI 26.79 kg/m²   Physical Exam  FiO2 (%):  [30 %] 30 %   Assist control  Ppeak 22  Vt 550 ml  rate 16 /min  PEEP 5.0   Propofol 50 mcg/kg/min  ativan TF @ 50 ml / hr  ETT  NG tube with bridle  bilateral wrist restrains  queen    Physical Exam  Laying in bed, sedated but easily agitated when nursing care is done, intubated  Eyes closed  Mucosae moist  Breathing stable  Heart rate controlled. Normotensive  Soft, abdominal obesity    Discharge Disposition:  Short Term Hospital (DC - External)    Discharge Medications:     Discharge Medications      New Medications      Instructions Start Date   bisacodyl 5 MG EC tablet  Commonly known as: DULCOLAX   5 mg, Oral, Daily PRN      bisacodyl 10 MG suppository  Commonly known as: DULCOLAX   10 mg, Rectal, Daily PRN      ceFAZolin in 0.9% normal " saline 2 GM/ 100 mL solution IVPB  Commonly known as: ANCEF   2 g, Intravenous, Every 8 Hours      docusate sodium 50 mg/5 mL liquid  Commonly known as: COLACE   100 mg, Oral, Daily   Start Date: April 6, 2023     hydrALAZINE 20 MG/ML injection  Commonly known as: APRESOLINE   10 mg, Intravenous, Every 6 Hours PRN      LORazepam 100 mg in sodium chloride 0.9 % 50 mL   0.5-10 mg/hr (0.5-10 mg/hr), Intravenous, Titrated      magnesium hydroxide 2400 MG/10ML suspension suspension  Commonly known as: MILK OF MAGNESIA   10 mL, Oral, Daily   Start Date: April 6, 2023     nicotine 21 MG/24HR patch  Commonly known as: NICODERM CQ   1 patch, Transdermal, Every 24 Hours   Start Date: April 6, 2023     pantoprazole 40 MG injection  Commonly known as: PROTONIX   40 mg, Intravenous, Every Early Morning   Start Date: April 6, 2023     PHENobarbital 32.5 mg in sodium chloride 0.9 % 100 mL IVPB   32.5 mg, Intravenous, Every 8 Hours      polyethylene glycol 17 g packet  Commonly known as: MIRALAX   17 g, Oral, Daily   Start Date: April 6, 2023     propofol 10 mg/mL emulsion infusion  Commonly known as: DIPRIVAN   5-100 mcg/kg/min (427-8,540 mcg/min), Intravenous, Titrated      sennosides-docusate 8.6-50 MG per tablet  Commonly known as: PERICOLACE   2 tablets, Oral, 2 Times Daily         Continue These Medications      Instructions Start Date   atenolol 100 MG tablet  Commonly known as: TENORMIN   100 mg, Oral, Daily         Discharge Diet: tube feeds for now    Activity at Discharge:  as tolerated once extubated    Discharge Care Plan/Instructions: to [facility]    Follow-up Appointments:   No future appointments.    Test Results Pending at Discharge:   Pending Labs     Order Current Status    Blood Culture - Blood, Arm, Left Preliminary result    Respiratory Culture - Sputum, ET Suction Preliminary result      Time: 1330                Electronically signed by Nikki Stallings MD at 04/05/23 1412       Discharge Order (From admission,  onward)     Start     Ordered    04/05/23 0928  Discharge patient  Once        Expected Discharge Date: 04/05/23    Discharge Disposition: Short Term Hospital (DC - External)    Physician of Record for Attribution - Please select from Treatment Team: YASMIN HAGAN [[id number]]    Review needed by CMO to determine Physician of Record: No       Question Answer Comment   Physician of Record for Attribution - Please select from Treatment Team YASMIN HAGAN    Review needed by CMO to determine Physician of Record No        04/05/23 0943

## 2023-04-07 ENCOUNTER — APPOINTMENT (OUTPATIENT)
Dept: GENERAL RADIOLOGY | Facility: HOSPITAL | Age: 38
End: 2023-04-07
Payer: COMMERCIAL

## 2023-04-07 ENCOUNTER — OUTSIDE FACILITY SERVICE (OUTPATIENT)
Dept: PULMONOLOGY | Facility: CLINIC | Age: 38
End: 2023-04-07
Payer: COMMERCIAL

## 2023-04-07 LAB
ALBUMIN SERPL-MCNC: 4 G/DL (ref 3.5–5.2)
ALBUMIN/GLOB SERPL: 1.1 G/DL
ALP SERPL-CCNC: 90 U/L (ref 39–117)
ALT SERPL W P-5'-P-CCNC: 15 U/L (ref 1–41)
ANION GAP SERPL CALCULATED.3IONS-SCNC: 14 MMOL/L (ref 5–15)
AST SERPL-CCNC: 16 U/L (ref 1–40)
BACTERIA SPEC AEROBE CULT: NORMAL
BASOPHILS # BLD AUTO: 0.06 10*3/MM3 (ref 0–0.2)
BASOPHILS # BLD MANUAL: 0.13 10*3/MM3 (ref 0–0.2)
BASOPHILS NFR BLD AUTO: 0.5 % (ref 0–1.5)
BASOPHILS NFR BLD MANUAL: 1 % (ref 0–1.5)
BILIRUB SERPL-MCNC: 0.3 MG/DL (ref 0–1.2)
BUN SERPL-MCNC: 11 MG/DL (ref 6–20)
BUN/CREAT SERPL: 14.5 (ref 7–25)
CALCIUM SPEC-SCNC: 9.5 MG/DL (ref 8.6–10.5)
CHLORIDE SERPL-SCNC: 104 MMOL/L (ref 98–107)
CO2 SERPL-SCNC: 22 MMOL/L (ref 22–29)
CREAT SERPL-MCNC: 0.76 MG/DL (ref 0.76–1.27)
DEPRECATED RDW RBC AUTO: 55.3 FL (ref 37–54)
EGFRCR SERPLBLD CKD-EPI 2021: 119.5 ML/MIN/1.73
EOSINOPHIL # BLD AUTO: 0.13 10*3/MM3 (ref 0–0.4)
EOSINOPHIL # BLD MANUAL: 0.13 10*3/MM3 (ref 0–0.4)
EOSINOPHIL NFR BLD AUTO: 1 % (ref 0.3–6.2)
EOSINOPHIL NFR BLD MANUAL: 1 % (ref 0.3–6.2)
ERYTHROCYTE [DISTWIDTH] IN BLOOD BY AUTOMATED COUNT: 14.9 % (ref 12.3–15.4)
GLOBULIN UR ELPH-MCNC: 3.5 GM/DL
GLUCOSE BLDC GLUCOMTR-MCNC: 114 MG/DL (ref 70–130)
GLUCOSE BLDC GLUCOMTR-MCNC: 88 MG/DL (ref 70–130)
GLUCOSE SERPL-MCNC: 93 MG/DL (ref 65–99)
HCT VFR BLD AUTO: 37.9 % (ref 37.5–51)
HGB BLD-MCNC: 12.7 G/DL (ref 13–17.7)
LYMPHOCYTES # BLD AUTO: 1.39 10*3/MM3 (ref 0.7–3.1)
LYMPHOCYTES # BLD MANUAL: 2.62 10*3/MM3 (ref 0.7–3.1)
LYMPHOCYTES NFR BLD AUTO: 10.6 % (ref 19.6–45.3)
LYMPHOCYTES NFR BLD MANUAL: 3 % (ref 5–12)
MAGNESIUM SERPL-MCNC: 2.5 MG/DL (ref 1.6–2.6)
MCH RBC QN AUTO: 34.9 PG (ref 26.6–33)
MCHC RBC AUTO-ENTMCNC: 33.5 G/DL (ref 31.5–35.7)
MCV RBC AUTO: 104.1 FL (ref 79–97)
MONOCYTES # BLD AUTO: 1.3 10*3/MM3 (ref 0.1–0.9)
MONOCYTES # BLD: 0.39 10*3/MM3 (ref 0.1–0.9)
MONOCYTES NFR BLD AUTO: 9.9 % (ref 5–12)
NEUTROPHILS # BLD AUTO: 9.83 10*3/MM3 (ref 1.7–7)
NEUTROPHILS NFR BLD AUTO: 10.22 10*3/MM3 (ref 1.7–7)
NEUTROPHILS NFR BLD AUTO: 78 % (ref 42.7–76)
NEUTROPHILS NFR BLD MANUAL: 73 % (ref 42.7–76)
NEUTS BAND NFR BLD MANUAL: 2 % (ref 0–5)
PLATELET # BLD AUTO: 127 10*3/MM3 (ref 140–450)
PMV BLD AUTO: 12.6 FL (ref 6–12)
POTASSIUM SERPL-SCNC: 4.2 MMOL/L (ref 3.5–5.2)
PROT SERPL-MCNC: 7.5 G/DL (ref 6–8.5)
RBC # BLD AUTO: 3.64 10*6/MM3 (ref 4.14–5.8)
RBC MORPH BLD: NORMAL
SMALL PLATELETS BLD QL SMEAR: ABNORMAL
SODIUM SERPL-SCNC: 140 MMOL/L (ref 136–145)
VARIANT LYMPHS NFR BLD MANUAL: 14 % (ref 19.6–45.3)
VARIANT LYMPHS NFR BLD MANUAL: 6 % (ref 0–5)
WBC MORPH BLD: NORMAL
WBC NRBC COR # BLD: 13.1 10*3/MM3 (ref 3.4–10.8)
WHOLE BLOOD HOLD SPECIMEN: NORMAL

## 2023-04-07 PROCEDURE — 85025 COMPLETE CBC W/AUTO DIFF WBC: CPT | Performed by: INTERNAL MEDICINE

## 2023-04-07 PROCEDURE — 85007 BL SMEAR W/DIFF WBC COUNT: CPT | Performed by: INTERNAL MEDICINE

## 2023-04-07 PROCEDURE — 25010000002 PROPOFOL 10 MG/ML EMULSION: Performed by: INTERNAL MEDICINE

## 2023-04-07 PROCEDURE — 97110 THERAPEUTIC EXERCISES: CPT

## 2023-04-07 PROCEDURE — 25010000002 ENOXAPARIN PER 10 MG: Performed by: INTERNAL MEDICINE

## 2023-04-07 PROCEDURE — 25010000002 LORAZEPAM PER 2 MG: Performed by: INTERNAL MEDICINE

## 2023-04-07 PROCEDURE — 82962 GLUCOSE BLOOD TEST: CPT

## 2023-04-07 PROCEDURE — 25010000002 CEFAZOLIN PER 500 MG: Performed by: INTERNAL MEDICINE

## 2023-04-07 PROCEDURE — 97530 THERAPEUTIC ACTIVITIES: CPT

## 2023-04-07 PROCEDURE — 25010000002 THIAMINE PER 100 MG

## 2023-04-07 PROCEDURE — 25010000002 PHENOBARBITAL PER 120 MG: Performed by: INTERNAL MEDICINE

## 2023-04-07 PROCEDURE — 83735 ASSAY OF MAGNESIUM: CPT | Performed by: INTERNAL MEDICINE

## 2023-04-07 PROCEDURE — 80053 COMPREHEN METABOLIC PANEL: CPT | Performed by: INTERNAL MEDICINE

## 2023-04-07 PROCEDURE — 87040 BLOOD CULTURE FOR BACTERIA: CPT | Performed by: INTERNAL MEDICINE

## 2023-04-07 PROCEDURE — 74018 RADEX ABDOMEN 1 VIEW: CPT

## 2023-04-07 PROCEDURE — 99233 SBSQ HOSP IP/OBS HIGH 50: CPT | Performed by: PSYCHIATRY & NEUROLOGY

## 2023-04-07 RX ORDER — CHLORDIAZEPOXIDE HYDROCHLORIDE 25 MG/1
25 CAPSULE, GELATIN COATED ORAL EVERY 8 HOURS SCHEDULED
Status: DISCONTINUED | OUTPATIENT
Start: 2023-04-07 | End: 2023-04-08

## 2023-04-07 NOTE — ACP (ADVANCE CARE PLANNING)
[facility] @ [facility]  INPATIENT PROGRESS NOTE    PATIENT NAME: Jackson Acuña      PHYSICIAN: Di Hopkins MD  : 1985        MRN: [id number]  Patient Care Team:  Brandon Darling MD as PCP - General  Brandon Darling MD as PCP - Family Medicine    Chief Complaint:  N/V syncope  History of Present Illness: Patient is a 36 y/o male who presented to [facility]  N/V/syncope and a period of hyperventilation after consuming alcohol, causing him to have withdrawal symptoms.  He was placed on supportive medications and requested to speak with psychiatry for alcohol cessation.  He in turn became severely agitated requiring intubation with mechanical ventilation in order to stablize him and maintain stabilization.  He was further noted with MSSA pneumonia and bacteremia and is currently completing course of antibiotic therapy.  He has arrived to this facility for mechanical ventilation weaning, continued withdrawal treatment, and therapy.  Psychiatric services to follow.  Patient remains lying in bed on mechanical ventilation and sedation.  He is stable.  I have reviewed patient medical records, discharge summary and labs and diagnostics independently.  All labs and diagnostics are listed below.     Assessment       Acute respiratory failure with hypoxia and hypercapnia  Fever  Staphylococcus aureus pneumonia  Delirium tremens  Anxiety disorder  Affective disorder  Alcohol withdrawal  Tansaminitis  Depression  Hypertension  Mitral valve prolapse  DVT & GI prphylaxis     DVT Prophylaxis: Lovenox  GI Prophylaxis: Famotidine  Code Status: Full     Plan      -Stable at time of exam.  -Mechanical ventilation and sedation  -Wean as tolerated.  -Continue to monitor for withdrawal symptoms and treat applicably.  -Psychiatry follow up appreciated.  -Therapy for ROM  -Pulmonology follow up appreciated.  -We will monitor labs closely  -Continue antibiotic therapy as  before.  -Telemetry  -KUB due to abd distention.  -DVT and GI prophylaxis in place.  -We'll continue monitoring patient in hospital setting and treat patient as course dictates.  -Please review orders for detailed plan of care.  -For Acute and Chronic medical condition we will continue medications described below in medication section which have been reviewed and we will continue unless changed in plan of care.  -Laboratory and diagnostic studies have been independently reviewed and the reports are reviewed as documented below.    Subjective   Interval History:   Patient Complaints: Resting comfortably in bed.  Remains on mechanical ventilation and sedation.  Family at bedside.  All questions and concerns addressed and answered to their satisfaction.  History taken from: Medical record, wife, nursing staff.    Review of Systems:    Review of Systems   Unable to perform ROS: Intubated       Objective   Vital Signs  Temp: 100.2 F         Heart Rate: 97         Resp: 20          Blood Pressure: 138/65         Pulse Ox: 98 %    Physical Exam:   Physical Exam  Constitutional:       General: He is not in acute distress.     Appearance: He is ill-appearing.   HENT:      Head: Normocephalic and atraumatic.   Eyes:      Conjunctiva/sclera: Conjunctivae normal.      Pupils: Pupils are equal, round, and reactive to light.   Cardiovascular:      Rate and Rhythm: Normal rate and regular rhythm.      Pulses: Normal pulses.      Heart sounds: Normal heart sounds. No murmur heard.  Pulmonary:      Effort: Pulmonary effort is normal. No respiratory distress.      Breath sounds: Normal breath sounds. No rales.   Abdominal:      General: Bowel sounds are normal. There is no distension.      Palpations: Abdomen is soft.      Tenderness: There is no guarding.   Musculoskeletal:      Cervical back: No rigidity or tenderness.      Right lower leg: No edema.      Left lower leg: No edema.   Skin:     General: Skin is warm and dry.       Capillary Refill: Capillary refill takes 2 to 3 seconds.   Neurological:      Comments: Intubated   Psychiatric:      Comments: Intubated       Results Review:       Results from last 7 days   Lab Units 04/07/23  0754 04/06/23  0623 04/05/23  0630 04/03/23  0528 04/02/23  0525   SODIUM mmol/L 140 141 140 142 139   POTASSIUM mmol/L 4.2 3.8 3.6 3.9 3.8   CHLORIDE mmol/L 104 105 104 106 105   CO2 mmol/L 22.0 22.0 24.0 23.0 21.0*   BUN mg/dL 11 12 12 14 12   CREATININE mg/dL 0.76 0.64* 0.58* 0.77 0.68*   GLUCOSE mg/dL 93 122* 100* 112* 119*   CALCIUM mg/dL 9.5 9.3 9.4 9.4 9.2   BILIRUBIN mg/dL 0.3 0.2 0.2 0.2 0.2   ALK PHOS U/L 90 83 86 95 96   ALT (SGPT) U/L 15 19 22 28 26   AST (SGOT) U/L 16 19 18 31 22     Results from last 7 days   Lab Units 04/07/23  0754 04/06/23  0623 04/02/23  0525   MAGNESIUM mg/dL 2.5 2.3 2.2     Results from last 7 days   Lab Units 04/06/23  0623 04/05/23  0630 04/03/23  0528 04/02/23  0525   WBC 10*3/mm3 11.42* 12.16* 14.80* 10.30   HEMOGLOBIN g/dL 12.2* 12.1* 12.6* 12.1*   HEMATOCRIT % 37.0* 35.3* 38.0 36.0*   PLATELETS 10*3/mm3 123* 167 340 407     Lab Results   Component Value Date    CKTOTAL 121 06/16/2021    TROPONINT 7 03/22/2023     pH, Arterial   Date Value Ref Range Status   04/05/2023 7.471 (H) 7.350 - 7.450 pH units Final     Comment:     83 Value above reference range     CO2   Date Value Ref Range Status   04/07/2023 22.0 22.0 - 29.0 mmol/L Final         Imaging Results (Most Recent)     Procedure Component Value Units Date/Time    XR Abdomen KUB [[id number]] Collected: 04/05/23 1635     Updated: 04/05/23 1659    Narrative:      FINDINGS:  Weighted feeding tube tip is in the third portion of the duodenum.    XR Abdomen KUB [[id number]] Collected: 04/05/23 1512     Updated: 04/05/23 1535    Narrative:      FINDINGS:  A film of the upper abdomen shows a nasoenteric feeding tube with its tip in the  third portion of the duodenum.  This has advanced when compared with 3/24/2023.    XR  Chest 1 View [[id number]] Collected: 04/05/23 1511     Updated: 04/05/23 1534    Narrative:      FINDINGS:  AP view of the chest shows some minimal volume loss in the left lung base.    No other active disease.    Endotracheal tube and nasoenteric feeding tube in place.  Midline catheter in  place within the right arm.           Blood Culture   Date Value Ref Range Status   04/02/2023 No growth at 4 days  Preliminary     BCID, PCR   Date Value Ref Range Status   04/02/2023 (A) Negative by BCID PCR. Culture to Follow. Final    Staph spp, not aureus or lugdunensis. Identification by BCID2 PCR.     No results found for: CULTURES, HSVCX, URCX  No results found for: EYECULTURE, GCCX, HSVCULTURE, LABHSV  No results found for: LEGIONELLA, MRSACX, MUMPSCX, MYCOPLASCX  No results found for: NOCARDIACX, STOOLCX  No results found for: THROATCX, UNSTIMCULT, URINECX, CULTURE, VZVCULTUR  No results found for: VIRALCULTU, WOUNDCX  Medication Reviewed and Will Continue Unless Documented in Plan:   atenolol, 100 mg, Nasogastric, Daily  ceFAZolin, 2 g, Intravenous, Q8H  chlorhexidine, 15 mL, Mouth/Throat, Q12H  docusate sodium, 100 mg, Nasogastric, Daily  enoxaparin, 40 mg, Subcutaneous, Daily  Insulin Aspart, 2-12 Units, Subcutaneous, 4x Daily AC & at Bedtime  magnesium hydroxide, 10 mL, Nasogastric, Daily  nicotine, 1 patch, Transdermal, Q24H  pantoprazole, 40 mg, Intravenous, Q AM  PHENobarbital IVPB in 100 mL, 32.5 mg, Intravenous, Q8H  polyethylene glycol, 17 g, Nasogastric, Daily  senna-docusate sodium, 2 tablet, Nasogastric, BID  sodium chloride, 3 mL, Intracatheter, Q12H  thiamine (B-1) IV, 500 mg, Intravenous, TID      lactated ringers, 75 mL/hr  LORazepam (ATIVAN) infusion 1 mg/mL, 0.5-10 mg/hr  propofol, 5-50 mcg/kg/min      •  acetaminophen  •  bisacodyl  •  dextrose  •  hydrALAZINE  •  LORazepam  •  naloxone  •  ondansetron  •  simethicone  •  sodium chloride  •  sodium chloride  lactated ringers, 75 mL/hr  LORazepam  (ATIVAN) infusion 1 mg/mL, 0.5-10 mg/hr  propofol, 5-50 mcg/kg/min       Dietary Orders (From admission, onward)     Start     Ordered    04/06/23 1247  NPO Diet NPO Type: Tube Feeding  Diet Effective Now        Question:  NPO Type  Answer:  Tube Feeding    04/06/23 1246    04/05/23 1610  Diet, Tube Feeding Peptamen AF (Vital AF 1.2); Tube Feeding Type: Continuous; Continuous Tube Feeding Start Rate (mL/hr): 50; Then Advance Rate By (mL/hr): Do Not Advance; Every __ Hours: Patient at Goal Rate; To Goal Rate of (mL/hr): 50  Diet Effective Now        Question Answer Comment   Tube Feeding Formula: Peptamen AF (Vital AF 1.2)    Tube Feeding Type Continuous    Continuous Tube Feeding Start Rate (mL/hr) 50    Then Advance Rate By (mL/hr) Do Not Advance    Every __ Hours Patient at Goal Rate    To Goal Rate of (mL/hr) 50    Water Flush Amount (mL) 25    Water Flush Frequency Every 1 Hour        04/05/23 1609              History, physical exam, assessment and plan may have been partly or fully copied from before, but  changes made to the copied record to reflect care on the date of service. Part of the lab and imaging  reports auto populated and corrected. Some of this note may be an electronic transcription of spoken  language to printed text. This may permit erroneous, or at times, nonsensical words or phrases to be  inadvertently transcribed. Although I have reviewed the note for such errors, some may still exist.      This document has been electronically signed by Di Hopkins MD on April 7, 2023 09:15 CDT

## 2023-04-08 ENCOUNTER — OUTSIDE FACILITY SERVICE (OUTPATIENT)
Dept: PULMONOLOGY | Facility: CLINIC | Age: 38
End: 2023-04-08
Payer: COMMERCIAL

## 2023-04-08 LAB
ARTERIAL PATENCY WRIST A: NORMAL
ARTERIAL PATENCY WRIST A: POSITIVE
ATMOSPHERIC PRESS: 753 MMHG
ATMOSPHERIC PRESS: 753 MMHG
BACTERIA SPEC RESP CULT: ABNORMAL
BACTERIA SPEC RESP CULT: ABNORMAL
BASE EXCESS BLDA CALC-SCNC: 1.1 MMOL/L (ref 0–2)
BASE EXCESS BLDA CALC-SCNC: 1.4 MMOL/L (ref 0–2)
BDY SITE: ABNORMAL
BDY SITE: NORMAL
GAS FLOW AIRWAY: 24 LPM
GLUCOSE BLDC GLUCOMTR-MCNC: 100 MG/DL (ref 70–130)
GLUCOSE BLDC GLUCOMTR-MCNC: 104 MG/DL (ref 70–130)
GRAM STN SPEC: ABNORMAL
HCO3 BLDA-SCNC: 25.1 MMOL/L (ref 20–26)
HCO3 BLDA-SCNC: 25.1 MMOL/L (ref 20–26)
INHALED O2 CONCENTRATION: 35 %
Lab: NORMAL
MODALITY: ABNORMAL
MODALITY: NORMAL
PCO2 BLDA: 35.8 MM HG (ref 35–45)
PCO2 BLDA: 37 MM HG (ref 35–45)
PEEP RESPIRATORY: 5 CM[H2O]
PH BLDA: 7.44 PH UNITS (ref 7.35–7.45)
PH BLDA: 7.45 PH UNITS (ref 7.35–7.45)
PO2 BLDA: 101 MM HG (ref 83–108)
PO2 BLDA: 81.4 MM HG (ref 83–108)
PSV: 12 CMH2O
SAO2 % BLDCOA: 97.3 % (ref 94–99)
SAO2 % BLDCOA: 98.7 % (ref 94–99)
VENTILATOR MODE: ABNORMAL
VENTILATOR MODE: NORMAL

## 2023-04-08 PROCEDURE — 25010000002 ENOXAPARIN PER 10 MG: Performed by: INTERNAL MEDICINE

## 2023-04-08 PROCEDURE — 25010000002 PHENOBARBITAL PER 120 MG: Performed by: INTERNAL MEDICINE

## 2023-04-08 PROCEDURE — 25010000002 LORAZEPAM PER 2 MG: Performed by: INTERNAL MEDICINE

## 2023-04-08 PROCEDURE — 25010000002 PROPOFOL 10 MG/ML EMULSION: Performed by: INTERNAL MEDICINE

## 2023-04-08 PROCEDURE — 25010000002 THIAMINE PER 100 MG

## 2023-04-08 PROCEDURE — 25010000002 CEFAZOLIN PER 500 MG: Performed by: INTERNAL MEDICINE

## 2023-04-08 PROCEDURE — 82962 GLUCOSE BLOOD TEST: CPT

## 2023-04-08 PROCEDURE — 82803 BLOOD GASES ANY COMBINATION: CPT

## 2023-04-08 PROCEDURE — 99232 SBSQ HOSP IP/OBS MODERATE 35: CPT | Performed by: PSYCHIATRY & NEUROLOGY

## 2023-04-08 RX ORDER — LORAZEPAM 2 MG/ML
2 INJECTION INTRAMUSCULAR
Status: DISCONTINUED | OUTPATIENT
Start: 2023-04-08 | End: 2023-04-11

## 2023-04-08 RX ORDER — CHLORDIAZEPOXIDE HYDROCHLORIDE 25 MG/1
50 CAPSULE, GELATIN COATED ORAL ONCE
Status: DISCONTINUED | OUTPATIENT
Start: 2023-04-08 | End: 2023-04-08

## 2023-04-08 RX ORDER — LORAZEPAM 2 MG/ML
1 INJECTION INTRAMUSCULAR EVERY 4 HOURS PRN
Status: DISCONTINUED | OUTPATIENT
Start: 2023-04-08 | End: 2023-04-08

## 2023-04-08 RX ORDER — CHLORDIAZEPOXIDE HYDROCHLORIDE 25 MG/1
50 CAPSULE, GELATIN COATED ORAL 3 TIMES DAILY
Status: DISCONTINUED | OUTPATIENT
Start: 2023-04-08 | End: 2023-04-11

## 2023-04-08 RX ORDER — CHLORDIAZEPOXIDE HYDROCHLORIDE 25 MG/1
50 CAPSULE, GELATIN COATED ORAL EVERY 8 HOURS SCHEDULED
Status: DISCONTINUED | OUTPATIENT
Start: 2023-04-08 | End: 2023-04-08

## 2023-04-08 NOTE — PROGRESS NOTES
Psychiatry & Behavioral Health Inpatient Consult Progress Note                                      4/8/2023      Referring Provider: Dr. Hopkins / [facility]    Reason for Consultation & CC: Substance Related Withdrawal       Subjective --  Mr. Jackson Acuña is a 37 y.o. male who was seen on the LTAC unit.        As of today, 04/08/23:    Remains intubated.  No family at bedside.     Hypoactive at time of exam.  No hypertonia.  Intermittently follows commands per staff, but did not on my interaction.     Staff have been able to taper Ativan well.  Appears to respond well to Librium augmentation po.     Planning per staff to trial SBT this afternoon with goal of changing to scheduled Ativan push, as currently around 2 mg / hr now, and could transition to 4mg q2h IV push.       Review of Systems:  --Unable to meaningfully obtain given intubation      Objective   Objective --    Vital Signs:  Heart Rate:  [] 137         As of today, 04/08/23:    Physical Exam:   -General Appearance:  fatigued  -Hygiene:  Adequate   -Gait & Station:  Deferred, in bed  -Musculoskeletal:  No tremors or abnormal involuntary movements and No atrophy noted  -Pulm: unlaboured     Mental Status Exam:   Cooperation:  intubated and sedated; minimal  Eye Contact:  Open at times  Psychomotor Behavior:  intubated and sedated; slow  Mood: intubated and sedated and unable to answer  Affect:  asleep  Speech:  intubated and nonverbal  Thought Process:  intubated and nonverbal  Associations: intubated and nonverbal  Thought Content:                intubated and nonverbal              Suicidal:  None evidenced              Homicidal:  None evidenced              Hallucinations:  Not demonstrated today due to being intubated              Delusion:  Unable to demonstrate  Cognitive Functioning:              Consciousness: intubated and sedated  Reliability:  unable to assess due to being intubated  Insight:  unable to assess  due to being intubated  Judgement:  unable to assess due to being intubated  Impulse Control:  unable to assess due to being intubated      Diagnostic Data --  Lab Results (last 24 hours)     Procedure Component Value Units Date/Time    POC Glucose Once [[id number]]  (Normal) Collected: 04/08/23 0505    Specimen: Blood Updated: 04/08/23 0529     Glucose 104 mg/dL      Comment: : [id number] GUILLE MATTHEWMeter ID: [id number]       POC Glucose Once [[id number]]  (Normal) Collected: 04/07/23 2201    Specimen: Blood Updated: 04/07/23 2233     Glucose 114 mg/dL      Comment: : [id number] GUILLE MATTHEWMeter ID: [id number]       Blood Culture - Blood, Arm, Right [[id number]] Collected: 04/07/23 1723    Specimen: Blood from Arm, Right Updated: 04/07/23 1723    Blood Culture - Blood, Hand, Right [[id number]] Collected: 04/07/23 1723    Specimen: Blood from Hand, Right Updated: 04/07/23 1723    Respiratory Culture - Sputum, ET Suction [[id number]]  (Abnormal) Collected: 04/05/23 1602    Specimen: Sputum from ET Suction Updated: 04/07/23 1527     Respiratory Culture Rare Staphylococcus aureus     Gram Stain Moderate (3+) WBCs per low power field      Rare (1+) Epithelial cells per low power field      Few (2+) Mixed bacterial derrick    Manual Differential [[id number]]  (Abnormal) Collected: 04/07/23 0634    Specimen: Blood Updated: 04/07/23 1247     Neutrophil % 73.0 %      Lymphocyte % 14.0 %      Monocyte % 3.0 %      Eosinophil % 1.0 %      Basophil % 1.0 %      Bands %  2.0 %      Atypical Lymphocyte % 6.0 %      Neutrophils Absolute 9.83 10*3/mm3      Lymphocytes Absolute 2.62 10*3/mm3      Monocytes Absolute 0.39 10*3/mm3      Eosinophils Absolute 0.13 10*3/mm3      Basophils Absolute 0.13 10*3/mm3      RBC Morphology Normal     WBC Morphology Normal     Platelet Estimate Decreased    CBC & Differential [[id number]]  (Abnormal) Collected: 04/07/23 0634    Specimen: Blood Updated: 04/07/23 1244    Narrative:      The  following orders were created for panel order CBC & Differential.  Procedure                               Abnormality         Status                     ---------                               -----------         ------                     CBC Auto Differential[[id number]]        Abnormal            Final result                 Please view results for these tests on the individual orders.    CBC Auto Differential [[id number]]  (Abnormal) Collected: 04/07/23 0634    Specimen: Blood Updated: 04/07/23 1244     WBC 13.10 10*3/mm3      RBC 3.64 10*6/mm3      Hemoglobin 12.7 g/dL      Hematocrit 37.9 %      .1 fL      MCH 34.9 pg      MCHC 33.5 g/dL      RDW 14.9 %      RDW-SD 55.3 fl      MPV 12.6 fL      Platelets 127 10*3/mm3      Neutrophil % 78.0 %      Lymphocyte % 10.6 %      Monocyte % 9.9 %      Eosinophil % 1.0 %      Basophil % 0.5 %      Neutrophils, Absolute 10.22 10*3/mm3      Lymphocytes, Absolute 1.39 10*3/mm3      Monocytes, Absolute 1.30 10*3/mm3      Eosinophils, Absolute 0.13 10*3/mm3      Basophils, Absolute 0.06 10*3/mm3           Imaging Results (Last 24 Hours)     Procedure Component Value Units Date/Time    XR Abdomen KUB [[id number]] Collected: 04/07/23 1202     Updated: 04/07/23 1222    Narrative:      COMPARISON:  AP abdomen 4/5/2023    FINDINGS:  Enteric tube terminates in the third portion of the duodenum. No evidence of  bowel obstruction or gross free intraperitoneal air.            Medications:   Scheduled Meds:atenolol, 100 mg, Nasogastric, Daily  ceFAZolin, 2 g, Intravenous, Q8H  chlordiazePOXIDE, 50 mg, Oral, Once  chlordiazePOXIDE, 50 mg, Nasogastric, TID  chlorhexidine, 15 mL, Mouth/Throat, Q12H  docusate sodium, 100 mg, Nasogastric, Daily  enoxaparin, 40 mg, Subcutaneous, Daily  Insulin Aspart, 2-12 Units, Subcutaneous, 4x Daily AC & at Bedtime  magnesium hydroxide, 10 mL, Nasogastric, Daily  nicotine, 1 patch, Transdermal, Q24H  pantoprazole, 40 mg, Intravenous, Q  AM  PHENobarbital IVPB in 100 mL, 32.5 mg, Intravenous, Q8H  polyethylene glycol, 17 g, Nasogastric, Daily  senna-docusate sodium, 2 tablet, Nasogastric, BID  sodium chloride, 3 mL, Intracatheter, Q12H  thiamine (B-1) IV, 500 mg, Intravenous, TID      Continuous Infusions:lactated ringers, 75 mL/hr  LORazepam (ATIVAN) infusion 1 mg/mL, 0.5-10 mg/hr  propofol, 5-50 mcg/kg/min      PRN Meds:.•  acetaminophen  •  bisacodyl  •  dextrose  •  hydrALAZINE  •  LORazepam  •  naloxone  •  ondansetron  •  simethicone  •  sodium chloride  •  sodium chloride      Assessment:     * No active hospital problems. *    --Delirium Tremens  --Alcohol Withdrawal Syndrome, severe, with complication  --Alcohol Use disorder, severe  --Anxiety disorder  --Affective disorder            DIAGNOSTIC IMPRESSION: Complex situation re: ventilation / pulmonary needs and neuropsychiatric status with withdrawal and CNS acting medications.        Treatment Plan & Recommendations:    HTN:               --Continued atenolol 100mg daily.              --Treat BP elevation with PRN ativan for withdrawal.      Alcohol Withdrawal/Delirium Tremens:              --Cont Phenobart 32.5mg q8hrs.      --Can taper once Ativan is lower              --Cont CIIWA and PRN ativan              --Cont Ativan taper drip to by 0.25mg and slowly taper w/ plan to transition to IV pushes              --Cont ativan PRN for w/d s/sx   --ON chart review Librium has been added; will defer to primary team re: use, may be of benefit to augment Ativan taper; may wish to monitor LFTs.    --Cont Thiamine 500mg TID for wernicke's ppx       Anxiety & Affective Disorder:              --None current       -->All questions answered for the patient's family.    --> Impression and recommendations discussed with nursing staff.      Thank you for this consult.  Please contact me with any further questions or concerns.       Omkar Hope II, MD  Psychiatry & Behavioral Sciences  04/08/23 @  12:19 CDT  Dictated using Dragon.

## 2023-04-08 NOTE — PROGRESS NOTES
Psychiatry & Behavioral Health Inpatient Consult Progress Note                                      4/7/2023      Referring Provider: Dr. Hopkins / LT[facility]    Reason for Consultation & CC: Substance Related Withdrawal       Subjective --  Mr. Jackson Acuña is a 37 y.o. male who was seen on the LTAC unit.        As of today, 04/07/23:    Seen for follow.  Known to service when seen with hospitalist service at [facility].     Complicated EtOH detox further complicated by intubation w/ difficultiy w/ w/d.  Consult to aid with CNS acting agents to aid with w/d and extubation.     Seen w/ pt's wife & sister at bedside.  He will intermittently follow their commands.      Per RN staff, difficulties lowering Ativan overnight.        Review of Systems:  --Unable to meaningfully obtain given intubation      Objective   Objective --    Vital Signs:  Heart Rate:  [] 96         As of today, 04/07/23:    Physical Exam:   -General Appearance:  fatigued  -Hygiene:  Adequate   -Gait & Station:  Deferred, in bed  -Musculoskeletal:  No tremors or abnormal involuntary movements and No atrophy noted  -Pulm: unlaboured     Mental Status Exam:   Cooperation:  intubated and sedated; minimal  Eye Contact:  Open at times  Psychomotor Behavior:  intubated and sedated; slow  Mood: intubated and sedated and unable to answer  Affect:  asleep  Speech:  intubated and nonverbal  Thought Process:  intubated and nonverbal  Associations: intubated and nonverbal  Thought Content:                intubated and nonverbal              Suicidal:  None evidenced              Homicidal:  None evidenced              Hallucinations:  Not demonstrated today due to being intubated              Delusion:  Unable to demonstrate  Cognitive Functioning:              Consciousness: intubated and sedated  Reliability:  unable to assess due to being intubated  Insight:  unable to assess due to being intubated  Judgement:  unable to assess due  to being intubated  Impulse Control:  unable to assess due to being intubated      Diagnostic Data --  Lab Results (last 24 hours)     Procedure Component Value Units Date/Time    Blood Culture - Blood, Arm, Right [[id number]] Collected: 04/07/23 1723    Specimen: Blood from Arm, Right Updated: 04/07/23 1723    Blood Culture - Blood, Hand, Right [[id number]] Collected: 04/07/23 1723    Specimen: Blood from Hand, Right Updated: 04/07/23 1723    Respiratory Culture - Sputum, ET Suction [[id number]]  (Abnormal) Collected: 04/05/23 1602    Specimen: Sputum from ET Suction Updated: 04/07/23 1527     Respiratory Culture Rare Staphylococcus aureus     Gram Stain Moderate (3+) WBCs per low power field      Rare (1+) Epithelial cells per low power field      Few (2+) Mixed bacterial derrick    Manual Differential [[id number]]  (Abnormal) Collected: 04/07/23 0634    Specimen: Blood Updated: 04/07/23 1247     Neutrophil % 73.0 %      Lymphocyte % 14.0 %      Monocyte % 3.0 %      Eosinophil % 1.0 %      Basophil % 1.0 %      Bands %  2.0 %      Atypical Lymphocyte % 6.0 %      Neutrophils Absolute 9.83 10*3/mm3      Lymphocytes Absolute 2.62 10*3/mm3      Monocytes Absolute 0.39 10*3/mm3      Eosinophils Absolute 0.13 10*3/mm3      Basophils Absolute 0.13 10*3/mm3      RBC Morphology Normal     WBC Morphology Normal     Platelet Estimate Decreased    CBC & Differential [[id number]]  (Abnormal) Collected: 04/07/23 0634    Specimen: Blood Updated: 04/07/23 1244    Narrative:      The following orders were created for panel order CBC & Differential.  Procedure                               Abnormality         Status                     ---------                               -----------         ------                     CBC Auto Differential[[id number]]        Abnormal            Final result                 Please view results for these tests on the individual orders.    CBC Auto Differential [[id number]]  (Abnormal) Collected: 04/07/23  0634    Specimen: Blood Updated: 04/07/23 1244     WBC 13.10 10*3/mm3      RBC 3.64 10*6/mm3      Hemoglobin 12.7 g/dL      Hematocrit 37.9 %      .1 fL      MCH 34.9 pg      MCHC 33.5 g/dL      RDW 14.9 %      RDW-SD 55.3 fl      MPV 12.6 fL      Platelets 127 10*3/mm3      Neutrophil % 78.0 %      Lymphocyte % 10.6 %      Monocyte % 9.9 %      Eosinophil % 1.0 %      Basophil % 0.5 %      Neutrophils, Absolute 10.22 10*3/mm3      Lymphocytes, Absolute 1.39 10*3/mm3      Monocytes, Absolute 1.30 10*3/mm3      Eosinophils, Absolute 0.13 10*3/mm3      Basophils, Absolute 0.06 10*3/mm3     POC Glucose Once [[id number]]  (Normal) Collected: 04/07/23 1111    Specimen: Blood Updated: 04/07/23 1201     Glucose 88 mg/dL      Comment: : Bryant JUAREZ DEANNAMeter ID: [id number]       Extra Tubes [[id number]] Collected: 04/07/23 0754    Specimen: Blood, Venous Line Updated: 04/07/23 0900    Narrative:      The following orders were created for panel order Extra Tubes.  Procedure                               Abnormality         Status                     ---------                               -----------         ------                     Lavender Top[[id number]]                                     Final result                 Please view results for these tests on the individual orders.    Lavender Top [[id number]] Collected: 04/07/23 0754    Specimen: Blood Updated: 04/07/23 0900     Extra Tube hold for add-on     Comment: Auto resulted       Magnesium [[id number]]  (Normal) Collected: 04/07/23 0754    Specimen: Blood Updated: 04/07/23 0818     Magnesium 2.5 mg/dL     Comprehensive Metabolic Panel [[id number]] Collected: 04/07/23 0754    Specimen: Blood Updated: 04/07/23 0818     Glucose 93 mg/dL      BUN 11 mg/dL      Creatinine 0.76 mg/dL      Sodium 140 mmol/L      Potassium 4.2 mmol/L      Chloride 104 mmol/L      CO2 22.0 mmol/L      Calcium 9.5 mg/dL      Total Protein 7.5 g/dL      Albumin 4.0 g/dL       ALT (SGPT) 15 U/L      AST (SGOT) 16 U/L      Alkaline Phosphatase 90 U/L      Total Bilirubin 0.3 mg/dL      Globulin 3.5 gm/dL      A/G Ratio 1.1 g/dL      BUN/Creatinine Ratio 14.5     Anion Gap 14.0 mmol/L      eGFR 119.5 mL/min/1.73     Narrative:      GFR Normal >60  Chronic Kidney Disease <60  Kidney Failure <15            Imaging Results (Last 24 Hours)     Procedure Component Value Units Date/Time    XR Abdomen KUB [[id number]] Collected: 04/07/23 1202     Updated: 04/07/23 1222    Narrative:      COMPARISON:  AP abdomen 4/5/2023    FINDINGS:  Enteric tube terminates in the third portion of the duodenum. No evidence of  bowel obstruction or gross free intraperitoneal air.            Medications:   Scheduled Meds:atenolol, 100 mg, Nasogastric, Daily  ceFAZolin, 2 g, Intravenous, Q8H  chlordiazePOXIDE, 25 mg, Nasogastric, Q8H  chlorhexidine, 15 mL, Mouth/Throat, Q12H  docusate sodium, 100 mg, Nasogastric, Daily  enoxaparin, 40 mg, Subcutaneous, Daily  Insulin Aspart, 2-12 Units, Subcutaneous, 4x Daily AC & at Bedtime  magnesium hydroxide, 10 mL, Nasogastric, Daily  nicotine, 1 patch, Transdermal, Q24H  pantoprazole, 40 mg, Intravenous, Q AM  PHENobarbital IVPB in 100 mL, 32.5 mg, Intravenous, Q8H  polyethylene glycol, 17 g, Nasogastric, Daily  senna-docusate sodium, 2 tablet, Nasogastric, BID  sodium chloride, 3 mL, Intracatheter, Q12H  thiamine (B-1) IV, 500 mg, Intravenous, TID      Continuous Infusions:lactated ringers, 75 mL/hr  LORazepam (ATIVAN) infusion 1 mg/mL, 0.5-10 mg/hr  propofol, 5-50 mcg/kg/min      PRN Meds:.•  acetaminophen  •  bisacodyl  •  dextrose  •  hydrALAZINE  •  LORazepam  •  naloxone  •  ondansetron  •  simethicone  •  sodium chloride  •  sodium chloride      Assessment:     * No active hospital problems. *    --Delirium Tremens  --Alcohol Withdrawal Syndrome, severe, with complication  --Alcohol Use disorder, severe  --Anxiety disorder  --Affective disorder            DIAGNOSTIC  IMPRESSION: Complex situation re: ventilation / pulmonary needs and neuropsychiatric status with withdrawal and CNS acting medications.        Treatment Plan & Recommendations:    HTN:               --Continued atenolol 100mg daily.              --Treat BP elevation with PRN ativan for withdrawal.      Alcohol Withdrawal/Delirium Tremens:              --Cont Phenobart 32.5mg q8hrs.      --Can taper once Ativan is lower              --Cont CIIWA and PRN ativan              --Decrease Ativan drip to by 0.25mg and slowly taper.  Continue at this dose over night.              --Cont ativan PRN for w/d s/sx   --ON chart review Librium has been added; will defer to primary team re: use, may be of benefit to augment Ativan taper; may wish to monitor LFTs.    --Cont Thiamine 500mg TID for wernicke's ppx       Anxiety & Affective Disorder:              --None current       -->All questions answered for the patient's family.    --> Impression and recommendations discussed with nursing staff.      Thank you for this consult.  Please contact me with any further questions or concerns.     >55 min (57 min) spent on care, including direct care, discussion with staff and family, chart review, documentation.      Omkar Hope II, MD  Psychiatry & Behavioral Sciences  04/07/23 @ 22:05 CDT  Dictated using Dragon.

## 2023-04-08 NOTE — ACP (ADVANCE CARE PLANNING)
[facility] @ [facility]  INPATIENT PROGRESS NOTE    PATIENT NAME: Jackson Acuña      PHYSICIAN: Di Hopkins MD  : 1985        MRN: [id number]  Patient Care Team:  Brandon Darling MD as PCP - General  Brandon Darling MD as PCP - Family Medicine    Chief Complaint:  N/V syncope  History of Present Illness: Patient is a 38 y/o male who presented to [facility]  N/V/syncope and a period of hyperventilation after consuming alcohol, causing him to have withdrawal symptoms.  He was placed on supportive medications and requested to speak with psychiatry for alcohol cessation.  He in turn became severely agitated requiring intubation with mechanical ventilation in order to stablize him and maintain stabilization.  He was further noted with MSSA pneumonia and bacteremia and is currently completing course of antibiotic therapy.  He has arrived to this facility for mechanical ventilation weaning, continued withdrawal treatment, and therapy.  Psychiatric services to follow.  Patient remains lying in bed on mechanical ventilation and sedation.  He is stable.  I have reviewed patient medical records, discharge summary and labs and diagnostics independently.  All labs and diagnostics are listed below.     Assessment       Acute respiratory failure with hypoxia and hypercapnia  Fever  Staphylococcus aureus pneumonia  Delirium tremens  Anxiety disorder  Affective disorder  Alcohol withdrawal  Tansaminitis  Depression  Hypertension  Mitral valve prolapse  DVT & GI prphylaxis     DVT Prophylaxis: Lovenox  GI Prophylaxis: Famotidine  Code Status: Full     Plan     -Clinically improving  -Opens eyes spontaneously response appropriately with gestures  -Extubation per pulmonology  -Discontinue Ativan infusion, initiate Ativan push hourly to manage DTs  -Stable at time of exam.  -Continue to monitor for withdrawal symptoms and treat applicably.  -Psychiatry follow up  appreciated.  -Therapy for ROM  -Pulmonology follow up appreciated.  -We will monitor labs closely  -Continue antibiotic therapy as before.  -Telemetry  -KUB due to abd distention.  -DVT and GI prophylaxis in place.  -We'll continue monitoring patient in hospital setting and treat patient as course dictates.  -Please review orders for detailed plan of care.  -For Acute and Chronic medical condition we will continue medications described below in medication section which have been reviewed and we will continue unless changed in plan of care.  -Laboratory and diagnostic studies have been independently reviewed and the reports are reviewed as documented below.    Subjective   Interval History:   Patient Complaints: Patient seen and examined.  Patient open eyes spontaneously shaking head yes, no to questions.  Will attempt to extubate today.  History taken from: Medical record, wife, nursing staff.    Review of Systems:    Review of Systems   Unable to perform ROS: Intubated       Objective   Vital Signs    Temp: 98.7 F         Heart Rate: 91         Resp: 20          Blood Pressure: 141/86         Pulse Ox: 96 %    Physical Exam:   Physical Exam  Constitutional:       General: He is not in acute distress.     Appearance: He is ill-appearing.   HENT:      Head: Normocephalic and atraumatic.   Eyes:      Conjunctiva/sclera: Conjunctivae normal.      Pupils: Pupils are equal, round, and reactive to light.   Cardiovascular:      Rate and Rhythm: Normal rate and regular rhythm.      Pulses: Normal pulses.      Heart sounds: Normal heart sounds. No murmur heard.  Pulmonary:      Effort: Pulmonary effort is normal. No respiratory distress.      Breath sounds: Normal breath sounds. No rales.   Abdominal:      General: Bowel sounds are normal. There is no distension.      Palpations: Abdomen is soft.      Tenderness: There is no guarding.   Musculoskeletal:      Cervical back: No rigidity or tenderness.      Right lower leg: No  edema.      Left lower leg: No edema.   Skin:     General: Skin is warm and dry.      Capillary Refill: Capillary refill takes 2 to 3 seconds.   Neurological:      Comments: Intubated   Psychiatric:      Comments: Intubated       Results Review:       Results from last 7 days   Lab Units 04/07/23  0754 04/06/23  0623 04/05/23  0630 04/03/23  0528 04/02/23  0525   SODIUM mmol/L 140 141 140 142 139   POTASSIUM mmol/L 4.2 3.8 3.6 3.9 3.8   CHLORIDE mmol/L 104 105 104 106 105   CO2 mmol/L 22.0 22.0 24.0 23.0 21.0*   BUN mg/dL 11 12 12 14 12   CREATININE mg/dL 0.76 0.64* 0.58* 0.77 0.68*   GLUCOSE mg/dL 93 122* 100* 112* 119*   CALCIUM mg/dL 9.5 9.3 9.4 9.4 9.2   BILIRUBIN mg/dL 0.3 0.2 0.2 0.2 0.2   ALK PHOS U/L 90 83 86 95 96   ALT (SGPT) U/L 15 19 22 28 26   AST (SGOT) U/L 16 19 18 31 22     Results from last 7 days   Lab Units 04/07/23  0754 04/06/23  0623 04/02/23  0525   MAGNESIUM mg/dL 2.5 2.3 2.2     Results from last 7 days   Lab Units 04/07/23  0634 04/06/23  0623 04/05/23  0630 04/03/23  0528 04/02/23  0525   WBC 10*3/mm3 13.10* 11.42* 12.16* 14.80* 10.30   HEMOGLOBIN g/dL 12.7* 12.2* 12.1* 12.6* 12.1*   HEMATOCRIT % 37.9 37.0* 35.3* 38.0 36.0*   PLATELETS 10*3/mm3 127* 123* 167 340 407     Lab Results   Component Value Date    CKTOTAL 121 06/16/2021    TROPONINT 7 03/22/2023     pH, Arterial   Date Value Ref Range Status   04/08/2023 7.441 7.350 - 7.450 pH units Final     CO2   Date Value Ref Range Status   04/07/2023 22.0 22.0 - 29.0 mmol/L Final         Imaging Results (Most Recent)     Procedure Component Value Units Date/Time    XR Abdomen KUB [[id number]] Collected: 04/07/23 1202     Updated: 04/07/23 1222    Narrative:      COMPARISON:  AP abdomen 4/5/2023    FINDINGS:  Enteric tube terminates in the third portion of the duodenum. No evidence of  bowel obstruction or gross free intraperitoneal air.    XR Abdomen KUB [[id number]] Collected: 04/05/23 1635     Updated: 04/05/23 1659    Narrative:       FINDINGS:  Weighted feeding tube tip is in the third portion of the duodenum.    XR Abdomen KUB [[id number]] Collected: 04/05/23 1512     Updated: 04/05/23 1535    Narrative:      FINDINGS:  A film of the upper abdomen shows a nasoenteric feeding tube with its tip in the  third portion of the duodenum.  This has advanced when compared with 3/24/2023.    XR Chest 1 View [[id number]] Collected: 04/05/23 1511     Updated: 04/05/23 1534    Narrative:      FINDINGS:  AP view of the chest shows some minimal volume loss in the left lung base.    No other active disease.    Endotracheal tube and nasoenteric feeding tube in place.  Midline catheter in  place within the right arm.           Blood Culture   Date Value Ref Range Status   04/02/2023 No growth at 4 days  Preliminary     BCID, PCR   Date Value Ref Range Status   04/02/2023 (A) Negative by BCID PCR. Culture to Follow. Final    Staph spp, not aureus or lugdunensis. Identification by BCID2 PCR.     No results found for: CULTURES, HSVCX, URCX  No results found for: EYECULTURE, GCCX, HSVCULTURE, LABHSV  No results found for: LEGIONELLA, MRSACX, MUMPSCX, MYCOPLASCX  No results found for: NOCARDIACX, STOOLCX  No results found for: THROATCX, UNSTIMCULT, URINECX, CULTURE, VZVCULTUR  No results found for: VIRALCULTU, WOUNDCX  Medication Reviewed and Will Continue Unless Documented in Plan:   atenolol, 100 mg, Nasogastric, Daily  ceFAZolin, 2 g, Intravenous, Q8H  chlordiazePOXIDE, 50 mg, Nasogastric, TID  chlorhexidine, 15 mL, Mouth/Throat, Q12H  docusate sodium, 100 mg, Nasogastric, Daily  enoxaparin, 40 mg, Subcutaneous, Daily  Insulin Aspart, 2-12 Units, Subcutaneous, 4x Daily AC & at Bedtime  LORazepam, 2 mg, Intravenous, Q2H  magnesium hydroxide, 10 mL, Nasogastric, Daily  nicotine, 1 patch, Transdermal, Q24H  pantoprazole, 40 mg, Intravenous, Q AM  PHENobarbital IVPB in 100 mL, 32.5 mg, Intravenous, Q8H  polyethylene glycol, 17 g, Nasogastric, Daily  senna-docusate  sodium, 2 tablet, Nasogastric, BID  sodium chloride, 3 mL, Intracatheter, Q12H  thiamine (B-1) IV, 500 mg, Intravenous, TID      lactated ringers, 75 mL/hr  propofol, 5-50 mcg/kg/min      •  acetaminophen  •  bisacodyl  •  dextrose  •  hydrALAZINE  •  LORazepam  •  naloxone  •  ondansetron  •  simethicone  •  sodium chloride  •  sodium chloride  lactated ringers, 75 mL/hr  propofol, 5-50 mcg/kg/min       Dietary Orders (From admission, onward)     Start     Ordered    04/06/23 1247  NPO Diet NPO Type: Tube Feeding  Diet Effective Now        Question:  NPO Type  Answer:  Tube Feeding    04/06/23 1246    04/05/23 1610  Diet, Tube Feeding Peptamen AF (Vital AF 1.2); Tube Feeding Type: Continuous; Continuous Tube Feeding Start Rate (mL/hr): 50; Then Advance Rate By (mL/hr): Do Not Advance; Every __ Hours: Patient at Goal Rate; To Goal Rate of (mL/hr): 50  Diet Effective Now        Question Answer Comment   Tube Feeding Formula: Peptamen AF (Vital AF 1.2)    Tube Feeding Type Continuous    Continuous Tube Feeding Start Rate (mL/hr) 50    Then Advance Rate By (mL/hr) Do Not Advance    Every __ Hours Patient at Goal Rate    To Goal Rate of (mL/hr) 50    Water Flush Amount (mL) 25    Water Flush Frequency Every 1 Hour        04/05/23 1609              History, physical exam, assessment and plan may have been partly or fully copied from before, but  changes made to the copied record to reflect care on the date of service. Part of the lab and imaging  reports auto populated and corrected. Some of this note may be an electronic transcription of spoken  language to printed text. This may permit erroneous, or at times, nonsensical words or phrases to be  inadvertently transcribed. Although I have reviewed the note for such errors, some may still exist.      This document has been electronically signed by Di Hopkins MD on April 8, 2023 13:48 CDT

## 2023-04-09 ENCOUNTER — OUTSIDE FACILITY SERVICE (OUTPATIENT)
Dept: PULMONOLOGY | Facility: CLINIC | Age: 38
End: 2023-04-09
Payer: COMMERCIAL

## 2023-04-09 LAB
GLUCOSE BLDC GLUCOMTR-MCNC: 105 MG/DL (ref 70–130)
GLUCOSE BLDC GLUCOMTR-MCNC: 95 MG/DL (ref 70–130)

## 2023-04-09 PROCEDURE — 25010000002 THIAMINE PER 100 MG

## 2023-04-09 PROCEDURE — 82962 GLUCOSE BLOOD TEST: CPT

## 2023-04-09 PROCEDURE — 25010000002 LORAZEPAM PER 2 MG: Performed by: INTERNAL MEDICINE

## 2023-04-09 PROCEDURE — 25010000002 PHENOBARBITAL PER 120 MG: Performed by: INTERNAL MEDICINE

## 2023-04-09 PROCEDURE — 99232 SBSQ HOSP IP/OBS MODERATE 35: CPT | Performed by: PSYCHIATRY & NEUROLOGY

## 2023-04-09 PROCEDURE — 25010000002 LORAZEPAM PER 2 MG: Performed by: PSYCHIATRY & NEUROLOGY

## 2023-04-09 PROCEDURE — 25010000002 ENOXAPARIN PER 10 MG: Performed by: INTERNAL MEDICINE

## 2023-04-09 PROCEDURE — 25010000002 CEFAZOLIN PER 500 MG: Performed by: INTERNAL MEDICINE

## 2023-04-09 NOTE — ACP (ADVANCE CARE PLANNING)
[facility] @ [facility]  INPATIENT PROGRESS NOTE    PATIENT NAME: Jackson Acuña      PHYSICIAN: Di Hopkins MD  : 1985        MRN: [id number]  Patient Care Team:  Brandon Darling MD as PCP - General  Brandon Darling MD as PCP - Family Medicine    Chief Complaint:  N/V syncope  History of Present Illness: Patient is a 38 y/o male who presented to [facility]  N/V/syncope and a period of hyperventilation after consuming alcohol, causing him to have withdrawal symptoms.  He was placed on supportive medications and requested to speak with psychiatry for alcohol cessation.  He in turn became severely agitated requiring intubation with mechanical ventilation in order to stablize him and maintain stabilization.  He was further noted with MSSA pneumonia and bacteremia and is currently completing course of antibiotic therapy.  He has arrived to this facility for mechanical ventilation weaning, continued withdrawal treatment, and therapy.  Psychiatric services to follow.  Patient remains lying in bed on mechanical ventilation and sedation.  He is stable.  I have reviewed patient medical records, discharge summary and labs and diagnostics independently.  All labs and diagnostics are listed below.     Assessment       Acute respiratory failure with hypoxia and hypercapnia  Fever  Staphylococcus aureus pneumonia  Delirium tremens  Anxiety disorder  Affective disorder  Alcohol withdrawal  Tansaminitis  Depression  Hypertension  Mitral valve prolapse  DVT & GI prphylaxis     DVT Prophylaxis: Lovenox  GI Prophylaxis: Famotidine  Code Status: Full     Plan     -Clinically improving  -Patient tolerated extubation  -NG tube present  -Continue Ativan to manage DTs  -Continue physical therapy.  -Stable at time of exam.  -Continue to monitor for withdrawal symptoms and treat applicably.  -Psychiatry follow up appreciated.  -Therapy for ROM  -Pulmonology follow up  appreciated.  -We will monitor labs closely  -Continue antibiotic therapy as before.  -Telemetry  -KUB due to abd distention.  -DVT and GI prophylaxis in place.  -We'll continue monitoring patient in hospital setting and treat patient as course dictates.  -Please review orders for detailed plan of care.  -For Acute and Chronic medical condition we will continue medications described below in medication section which have been reviewed and we will continue unless changed in plan of care.  -Laboratory and diagnostic studies have been independently reviewed and the reports are reviewed as documented below.    Subjective   Interval History:   Patient Complaints:Patient resting in bed without complaints.  No DTs or tremors.  No overnight events noted.    History taken from: Medical record, wife, nursing staff.    Review of Systems:    Review of Systems   Unable to perform ROS: Intubated       Objective   Vital Signs    Temp: 97.9F         Heart Rate: 82         resp: 17         blood Pressure: 139/95         Pulse Ox: 98%    Physical Exam:   Physical Exam  Constitutional:       General: He is not in acute distress.     Appearance: He is ill-appearing.   HENT:      Head: Normocephalic and atraumatic.   Eyes:      Conjunctiva/sclera: Conjunctivae normal.      Pupils: Pupils are equal, round, and reactive to light.   Cardiovascular:      Rate and Rhythm: Normal rate and regular rhythm.      Pulses: Normal pulses.      Heart sounds: Normal heart sounds. No murmur heard.  Pulmonary:      Effort: Pulmonary effort is normal. No respiratory distress.      Breath sounds: Normal breath sounds. No rales.   Abdominal:      General: Bowel sounds are normal. There is no distension.      Palpations: Abdomen is soft.      Tenderness: There is no guarding.   Musculoskeletal:      Cervical back: No rigidity or tenderness.      Right lower leg: No edema.      Left lower leg: No edema.   Skin:     General: Skin is warm and dry.       Capillary Refill: Capillary refill takes 2 to 3 seconds.   Neurological:      Comments: Intubated   Psychiatric:      Comments: Intubated       Results Review:       Results from last 7 days   Lab Units 04/07/23  0754 04/06/23  0623 04/05/23  0630 04/03/23  0528   SODIUM mmol/L 140 141 140 142   POTASSIUM mmol/L 4.2 3.8 3.6 3.9   CHLORIDE mmol/L 104 105 104 106   CO2 mmol/L 22.0 22.0 24.0 23.0   BUN mg/dL 11 12 12 14   CREATININE mg/dL 0.76 0.64* 0.58* 0.77   GLUCOSE mg/dL 93 122* 100* 112*   CALCIUM mg/dL 9.5 9.3 9.4 9.4   BILIRUBIN mg/dL 0.3 0.2 0.2 0.2   ALK PHOS U/L 90 83 86 95   ALT (SGPT) U/L 15 19 22 28   AST (SGOT) U/L 16 19 18 31     Results from last 7 days   Lab Units 04/07/23  0754 04/06/23  0623   MAGNESIUM mg/dL 2.5 2.3     Results from last 7 days   Lab Units 04/07/23  0634 04/06/23  0623 04/05/23  0630 04/03/23  0528   WBC 10*3/mm3 13.10* 11.42* 12.16* 14.80*   HEMOGLOBIN g/dL 12.7* 12.2* 12.1* 12.6*   HEMATOCRIT % 37.9 37.0* 35.3* 38.0   PLATELETS 10*3/mm3 127* 123* 167 340     Lab Results   Component Value Date    CKTOTAL 121 06/16/2021    TROPONINT 7 03/22/2023     pH, Arterial   Date Value Ref Range Status   04/08/2023 7.454 (H) 7.350 - 7.450 pH units Final     Comment:     83 Value above reference range     CO2   Date Value Ref Range Status   04/07/2023 22.0 22.0 - 29.0 mmol/L Final         Imaging Results (Most Recent)     Procedure Component Value Units Date/Time    XR Abdomen KUB [[id number]] Collected: 04/07/23 1202     Updated: 04/07/23 1222    Narrative:      COMPARISON:  AP abdomen 4/5/2023    FINDINGS:  Enteric tube terminates in the third portion of the duodenum. No evidence of  bowel obstruction or gross free intraperitoneal air.    XR Abdomen KUB [[id number]] Collected: 04/05/23 1635     Updated: 04/05/23 1659    Narrative:      FINDINGS:  Weighted feeding tube tip is in the third portion of the duodenum.    XR Abdomen KUB [[id number]] Collected: 04/05/23 1512     Updated: 04/05/23 1535     Narrative:      FINDINGS:  A film of the upper abdomen shows a nasoenteric feeding tube with its tip in the  third portion of the duodenum.  This has advanced when compared with 3/24/2023.    XR Chest 1 View [[id number]] Collected: 04/05/23 1511     Updated: 04/05/23 1534    Narrative:      FINDINGS:  AP view of the chest shows some minimal volume loss in the left lung base.    No other active disease.    Endotracheal tube and nasoenteric feeding tube in place.  Midline catheter in  place within the right arm.           Blood Culture   Date Value Ref Range Status   04/02/2023 No growth at 4 days  Preliminary     BCID, PCR   Date Value Ref Range Status   04/02/2023 (A) Negative by BCID PCR. Culture to Follow. Final    Staph spp, not aureus or lugdunensis. Identification by BCID2 PCR.     No results found for: CULTURES, HSVCX, URCX  No results found for: EYECULTURE, GCCX, HSVCULTURE, LABHSV  No results found for: LEGIONELLA, MRSACX, MUMPSCX, MYCOPLASCX  No results found for: NOCARDIACX, STOOLCX  No results found for: THROATCX, UNSTIMCULT, URINECX, CULTURE, VZVCULTUR  No results found for: VIRALCULTU, WOUNDCX  Medication Reviewed and Will Continue Unless Documented in Plan:   atenolol, 100 mg, Nasogastric, Daily  ceFAZolin, 2 g, Intravenous, Q8H  chlordiazePOXIDE, 50 mg, Nasogastric, TID  chlorhexidine, 15 mL, Mouth/Throat, Q12H  docusate sodium, 100 mg, Nasogastric, Daily  enoxaparin, 40 mg, Subcutaneous, Daily  Insulin Aspart, 2-12 Units, Subcutaneous, 4x Daily AC & at Bedtime  LORazepam, 2 mg, Intravenous, Q2H  magnesium hydroxide, 10 mL, Nasogastric, Daily  nicotine, 1 patch, Transdermal, Q24H  pantoprazole, 40 mg, Intravenous, Q AM  PHENobarbital IVPB in 100 mL, 32.5 mg, Intravenous, Q8H  polyethylene glycol, 17 g, Nasogastric, Daily  senna-docusate sodium, 2 tablet, Nasogastric, BID  sodium chloride, 3 mL, Intracatheter, Q12H  thiamine (B-1) IV, 500 mg, Intravenous, TID      lactated ringers, 75 mL/hr      •   acetaminophen  •  bisacodyl  •  dextrose  •  hydrALAZINE  •  LORazepam  •  naloxone  •  ondansetron  •  simethicone  •  sodium chloride  •  sodium chloride  lactated ringers, 75 mL/hr       Dietary Orders (From admission, onward)     Start     Ordered    04/06/23 1247  NPO Diet NPO Type: Tube Feeding  Diet Effective Now        Question:  NPO Type  Answer:  Tube Feeding    04/06/23 1246    04/05/23 1610  Diet, Tube Feeding Peptamen AF (Vital AF 1.2); Tube Feeding Type: Continuous; Continuous Tube Feeding Start Rate (mL/hr): 50; Then Advance Rate By (mL/hr): Do Not Advance; Every __ Hours: Patient at Goal Rate; To Goal Rate of (mL/hr): 50  Diet Effective Now        Question Answer Comment   Tube Feeding Formula: Peptamen AF (Vital AF 1.2)    Tube Feeding Type Continuous    Continuous Tube Feeding Start Rate (mL/hr) 50    Then Advance Rate By (mL/hr) Do Not Advance    Every __ Hours Patient at Goal Rate    To Goal Rate of (mL/hr) 50    Water Flush Amount (mL) 25    Water Flush Frequency Every 1 Hour        04/05/23 1609              History, physical exam, assessment and plan may have been partly or fully copied from before, but  changes made to the copied record to reflect care on the date of service. Part of the lab and imaging  reports auto populated and corrected. Some of this note may be an electronic transcription of spoken  language to printed text. This may permit erroneous, or at times, nonsensical words or phrases to be  inadvertently transcribed. Although I have reviewed the note for such errors, some may still exist.      This document has been electronically signed by Di Hopkins MD on April 9, 2023 13:21 CDT

## 2023-04-10 ENCOUNTER — OUTSIDE FACILITY SERVICE (OUTPATIENT)
Dept: PULMONOLOGY | Facility: CLINIC | Age: 38
End: 2023-04-10
Payer: COMMERCIAL

## 2023-04-10 LAB
ALBUMIN SERPL-MCNC: 3.8 G/DL (ref 3.5–5.2)
ALBUMIN/GLOB SERPL: 1.3 G/DL
ALP SERPL-CCNC: 81 U/L (ref 39–117)
ALT SERPL W P-5'-P-CCNC: 14 U/L (ref 1–41)
ANION GAP SERPL CALCULATED.3IONS-SCNC: 12 MMOL/L (ref 5–15)
AST SERPL-CCNC: 19 U/L (ref 1–40)
BILIRUB SERPL-MCNC: 0.2 MG/DL (ref 0–1.2)
BUN SERPL-MCNC: 8 MG/DL (ref 6–20)
BUN/CREAT SERPL: 13.8 (ref 7–25)
CALCIUM SPEC-SCNC: 9.3 MG/DL (ref 8.6–10.5)
CHLORIDE SERPL-SCNC: 102 MMOL/L (ref 98–107)
CO2 SERPL-SCNC: 26 MMOL/L (ref 22–29)
CREAT SERPL-MCNC: 0.58 MG/DL (ref 0.76–1.27)
EGFRCR SERPLBLD CKD-EPI 2021: 129.6 ML/MIN/1.73
GLOBULIN UR ELPH-MCNC: 3 GM/DL
GLUCOSE BLDC GLUCOMTR-MCNC: 102 MG/DL (ref 70–130)
GLUCOSE SERPL-MCNC: 89 MG/DL (ref 65–99)
MAGNESIUM SERPL-MCNC: 2.1 MG/DL (ref 1.6–2.6)
POTASSIUM SERPL-SCNC: 3.5 MMOL/L (ref 3.5–5.2)
PROT SERPL-MCNC: 6.8 G/DL (ref 6–8.5)
SODIUM SERPL-SCNC: 140 MMOL/L (ref 136–145)

## 2023-04-10 PROCEDURE — 83735 ASSAY OF MAGNESIUM: CPT | Performed by: INTERNAL MEDICINE

## 2023-04-10 PROCEDURE — 97166 OT EVAL MOD COMPLEX 45 MIN: CPT

## 2023-04-10 PROCEDURE — 85025 COMPLETE CBC W/AUTO DIFF WBC: CPT | Performed by: INTERNAL MEDICINE

## 2023-04-10 PROCEDURE — 82962 GLUCOSE BLOOD TEST: CPT

## 2023-04-10 PROCEDURE — 92610 EVALUATE SWALLOWING FUNCTION: CPT | Performed by: SPEECH-LANGUAGE PATHOLOGIST

## 2023-04-10 PROCEDURE — 97530 THERAPEUTIC ACTIVITIES: CPT

## 2023-04-10 PROCEDURE — 25010000002 THIAMINE PER 100 MG

## 2023-04-10 PROCEDURE — 80053 COMPREHEN METABOLIC PANEL: CPT | Performed by: INTERNAL MEDICINE

## 2023-04-10 PROCEDURE — 25010000002 ENOXAPARIN PER 10 MG: Performed by: INTERNAL MEDICINE

## 2023-04-10 PROCEDURE — 25010000002 PHENOBARBITAL PER 120 MG: Performed by: INTERNAL MEDICINE

## 2023-04-10 PROCEDURE — 25010000002 LORAZEPAM PER 2 MG: Performed by: PSYCHIATRY & NEUROLOGY

## 2023-04-10 RX ORDER — POTASSIUM CHLORIDE 750 MG/1
40 CAPSULE, EXTENDED RELEASE ORAL ONCE
Status: DISCONTINUED | OUTPATIENT
Start: 2023-04-10 | End: 2023-04-13 | Stop reason: HOSPADM

## 2023-04-10 RX ORDER — PHENOBARBITAL SODIUM 65 MG/ML
32.5 INJECTION INTRAMUSCULAR EVERY 8 HOURS
Status: DISCONTINUED | OUTPATIENT
Start: 2023-04-11 | End: 2023-04-11

## 2023-04-10 NOTE — ACP (ADVANCE CARE PLANNING)
[facility] @ [facility]  INPATIENT PROGRESS NOTE    PATIENT NAME: Jackson Acuña      PHYSICIAN: Di Hopkins MD  : 1985        MRN: [id number]  Patient Care Team:  Brandon Darling MD as PCP - General  Brandon Darling MD as PCP - Family Medicine    Chief Complaint:  N/V syncope  History of Present Illness: Patient is a 36 y/o male who presented to [facility]  N/V/syncope and a period of hyperventilation after consuming alcohol, causing him to have withdrawal symptoms.  He was placed on supportive medications and requested to speak with psychiatry for alcohol cessation.  He in turn became severely agitated requiring intubation with mechanical ventilation in order to stablize him and maintain stabilization.  He was further noted with MSSA pneumonia and bacteremia and is currently completing course of antibiotic therapy.  He has arrived to this facility for mechanical ventilation weaning, continued withdrawal treatment, and therapy.  Psychiatric services to follow.  Patient remains lying in bed on mechanical ventilation and sedation.  He is stable.  I have reviewed patient medical records, discharge summary and labs and diagnostics independently.  All labs and diagnostics are listed below.     Assessment       Acute respiratory failure with hypoxia and hypercapnia  Fever  Staphylococcus aureus pneumonia  Delirium tremens  Anxiety disorder  Affective disorder  Alcohol withdrawal  Tansaminitis  Depression  Hypertension  Mitral valve prolapse  DVT & GI prphylaxis     DVT Prophylaxis: Lovenox  GI Prophylaxis: Famotidine  Code Status: Full     Plan     -Patient off of ventilator support.  -Clinically improving.  -Patient tolerating oral diet.  -We will discontinue/hold off on NG tube feeding.  -We will recommend getting patient out of bed to chair as tolerated.  -Continue with Ativan for withdrawal symptoms.  -Psychiatry follow-up appreciated.  -DVT and GI  prophylaxis in place.  -We'll continue monitoring patient in hospital setting and treat patient as course dictates.  -Please review orders for detailed plan of care.  -For Acute and Chronic medical condition we will continue medications described below in medication section which have been reviewed and we will continue unless changed in plan of care.  -Laboratory and diagnostic studies have been independently reviewed and the reports are reviewed as documented below.    Subjective   Interval History:   Patient Complaints:  Patient seen and examined.  Patient resting comfortably.  No significant events noted overnight.  Patient resting comfortably and is cooperative.  History taken from: Medical record, wife, nursing staff.    Review of Systems:    Review of Systems   Unable to perform ROS: Mental status change       Objective   Vital Signs  Temp: 97.6 F         Heart Rate: 82         Resp: 18          Blood Pressure: 137/88         Pulse Ox: 94 %    Physical Exam:   Physical Exam  Constitutional:       General: He is not in acute distress.     Appearance: He is ill-appearing.   HENT:      Head: Normocephalic and atraumatic.   Eyes:      Conjunctiva/sclera: Conjunctivae normal.      Pupils: Pupils are equal, round, and reactive to light.   Cardiovascular:      Rate and Rhythm: Normal rate and regular rhythm.      Pulses: Normal pulses.      Heart sounds: Normal heart sounds. No murmur heard.  Pulmonary:      Effort: Pulmonary effort is normal. No respiratory distress.      Breath sounds: Normal breath sounds. No rales.   Abdominal:      General: Bowel sounds are normal. There is no distension.      Palpations: Abdomen is soft.      Tenderness: There is no guarding.   Musculoskeletal:      Cervical back: No rigidity or tenderness.      Right lower leg: No edema.      Left lower leg: No edema.   Skin:     General: Skin is warm and dry.      Capillary Refill: Capillary refill takes 2 to 3 seconds.   Neurological:       Comments: Intubated   Psychiatric:      Comments: Intubated       Results Review:       Results from last 7 days   Lab Units 04/10/23  0531 04/07/23  0754 04/06/23  0623 04/05/23  0630   SODIUM mmol/L 140 140 141 140   POTASSIUM mmol/L 3.5 4.2 3.8 3.6   CHLORIDE mmol/L 102 104 105 104   CO2 mmol/L 26.0 22.0 22.0 24.0   BUN mg/dL 8 11 12 12   CREATININE mg/dL 0.58* 0.76 0.64* 0.58*   GLUCOSE mg/dL 89 93 122* 100*   CALCIUM mg/dL 9.3 9.5 9.3 9.4   BILIRUBIN mg/dL 0.2 0.3 0.2 0.2   ALK PHOS U/L 81 90 83 86   ALT (SGPT) U/L 14 15 19 22   AST (SGOT) U/L 19 16 19 18     Results from last 7 days   Lab Units 04/10/23  0531 04/07/23  0754 04/06/23  0623   MAGNESIUM mg/dL 2.1 2.5 2.3     Results from last 7 days   Lab Units 04/07/23  0634 04/06/23  0623 04/05/23  0630   WBC 10*3/mm3 13.10* 11.42* 12.16*   HEMOGLOBIN g/dL 12.7* 12.2* 12.1*   HEMATOCRIT % 37.9 37.0* 35.3*   PLATELETS 10*3/mm3 127* 123* 167     Lab Results   Component Value Date    CKTOTAL 121 06/16/2021    TROPONINT 7 03/22/2023     pH, Arterial   Date Value Ref Range Status   04/08/2023 7.454 (H) 7.350 - 7.450 pH units Final     Comment:     83 Value above reference range     CO2   Date Value Ref Range Status   04/10/2023 26.0 22.0 - 29.0 mmol/L Final         Imaging Results (Most Recent)     Procedure Component Value Units Date/Time    XR Abdomen KUB [[id number]] Collected: 04/07/23 1202     Updated: 04/07/23 1222    Narrative:      COMPARISON:  AP abdomen 4/5/2023    FINDINGS:  Enteric tube terminates in the third portion of the duodenum. No evidence of  bowel obstruction or gross free intraperitoneal air.    XR Abdomen KUB [[id number]] Collected: 04/05/23 1635     Updated: 04/05/23 1659    Narrative:      FINDINGS:  Weighted feeding tube tip is in the third portion of the duodenum.    XR Abdomen KUB [[id number]] Collected: 04/05/23 1512     Updated: 04/05/23 1535    Narrative:      FINDINGS:  A film of the upper abdomen shows a nasoenteric feeding tube with  its tip in the  third portion of the duodenum.  This has advanced when compared with 3/24/2023.    XR Chest 1 View [[id number]] Collected: 04/05/23 1511     Updated: 04/05/23 1534    Narrative:      FINDINGS:  AP view of the chest shows some minimal volume loss in the left lung base.    No other active disease.    Endotracheal tube and nasoenteric feeding tube in place.  Midline catheter in  place within the right arm.           Blood Culture   Date Value Ref Range Status   04/02/2023 No growth at 4 days  Preliminary     BCID, PCR   Date Value Ref Range Status   04/02/2023 (A) Negative by BCID PCR. Culture to Follow. Final    Staph spp, not aureus or lugdunensis. Identification by BCID2 PCR.     No results found for: CULTURES, HSVCX, URCX  No results found for: EYECULTURE, GCCX, HSVCULTURE, LABHSV  No results found for: LEGIONELLA, MRSACX, MUMPSCX, MYCOPLASCX  No results found for: NOCARDIACX, STOOLCX  No results found for: THROATCX, UNSTIMCULT, URINECX, CULTURE, VZVCULTUR  No results found for: VIRALCULTU, WOUNDCX  Medication Reviewed and Will Continue Unless Documented in Plan:   atenolol, 100 mg, Nasogastric, Daily  ceFAZolin, 2 g, Intravenous, Q8H  chlordiazePOXIDE, 50 mg, Nasogastric, TID  chlorhexidine, 15 mL, Mouth/Throat, Q12H  docusate sodium, 100 mg, Nasogastric, Daily  enoxaparin, 40 mg, Subcutaneous, Daily  Insulin Aspart, 2-12 Units, Subcutaneous, 4x Daily AC & at Bedtime  LORazepam, 2 mg, Intravenous, Q2H  magnesium hydroxide, 10 mL, Nasogastric, Daily  nicotine, 1 patch, Transdermal, Q24H  pantoprazole, 40 mg, Intravenous, Q AM  PHENobarbital IVPB in 100 mL, 32.5 mg, Intravenous, Q8H  polyethylene glycol, 17 g, Nasogastric, Daily  senna-docusate sodium, 2 tablet, Nasogastric, BID  sodium chloride, 3 mL, Intracatheter, Q12H  thiamine (B-1) IV, 500 mg, Intravenous, TID      lactated ringers, 75 mL/hr      •  acetaminophen  •  bisacodyl  •  dextrose  •  hydrALAZINE  •  LORazepam  •  naloxone  •   pmd ondansetron  •  simethicone  •  sodium chloride  •  sodium chloride  lactated ringers, 75 mL/hr       Dietary Orders (From admission, onward)     Start     Ordered    04/10/23 1246  Diet: Regular/House Diet; Texture: Regular Texture (IDDSI 7); Fluid Consistency: Thin (IDDSI 0)  Diet Effective Now        References:    Diet Order Crosswalk   Question Answer Comment   Diets: Regular/House Diet    Texture: Regular Texture (IDDSI 7)    Fluid Consistency: Thin (IDDSI 0)        04/10/23 1245    04/05/23 1610  Diet, Tube Feeding Peptamen AF (Vital AF 1.2); Tube Feeding Type: Continuous; Continuous Tube Feeding Start Rate (mL/hr): 50; Then Advance Rate By (mL/hr): Do Not Advance; Every __ Hours: Patient at Goal Rate; To Goal Rate of (mL/hr): 50  Diet Effective Now        Question Answer Comment   Tube Feeding Formula: Peptamen AF (Vital AF 1.2)    Tube Feeding Type Continuous    Continuous Tube Feeding Start Rate (mL/hr) 50    Then Advance Rate By (mL/hr) Do Not Advance    Every __ Hours Patient at Goal Rate    To Goal Rate of (mL/hr) 50    Water Flush Amount (mL) 25    Water Flush Frequency Every 1 Hour        04/05/23 1609              History, physical exam, assessment and plan may have been partly or fully copied from before, but  changes made to the copied record to reflect care on the date of service. Part of the lab and imaging  reports auto populated and corrected. Some of this note may be an electronic transcription of spoken  language to printed text. This may permit erroneous, or at times, nonsensical words or phrases to be  inadvertently transcribed. Although I have reviewed the note for such errors, some may still exist.      This document has been electronically signed by Di Hopkins MD on April 10, 2023 14:26 CDT

## 2023-04-10 NOTE — ACP (ADVANCE CARE PLANNING)
NUTRITION ASSESSMENT    Pt started on PO diet today.  Will keep EN today and assess intake tomorrow to see if EN can be d/c'ed.    Last wt recorded 4/8:  207.8#  Last BM 4/9.

## 2023-04-10 NOTE — PROGRESS NOTES
Psychiatry & Behavioral Health Inpatient Consult Progress Note                                      4/9/2023      Referring Provider: Dr. Hopkins / [facility]    Reason for Consultation & CC: Substance Related Withdrawal       Subjective --  Mr. Jackson Acuña is a 37 y.o. male who was seen on the LTAC unit.        As of today, 04/09/23:    Able to be extubated yesterday.  Today is confused and minimally engaged.  Does follow single step commands.       Review of Systems:  --Unable to meaningfully obtain given intubation      Objective   Objective --    Vital Signs:  Heart Rate:  [73-91] 73         As of today, 04/09/23:    Physical Exam:   -General Appearance:  fatigued  -Hygiene:  Adequate   -Gait & Station:  Deferred, in bed  -Musculoskeletal:  No tremors or abnormal involuntary movements and No atrophy noted  -Pulm: unlaboured     Mental Status Exam:   Cooperation: minimal  Eye Contact:  Open at times  Psychomotor Behavior:  slow  Mood: did not answer  Affect:  confused  Speech:  iminimal  Thought Process:  sluggish  Associations: disorganized  Thought Content:                Likely more mood congruent              Suicidal:  None evidenced              Homicidal:  None evidenced              Hallucinations:  Not demonstrated today                Delusion:  Unable to demonstrate  Cognitive Functioning:              Consciousness: alert, O to self  Reliability:  limited  Insight:  limited  Judgement:  limited  Impulse Control:  limited      Diagnostic Data --  Lab Results (last 24 hours)     Procedure Component Value Units Date/Time    POC Glucose Once [[id number]]  (Normal) Collected: 04/09/23 2149    Specimen: Blood Updated: 04/09/23 2231     Glucose 105 mg/dL      Comment: : [id number] LANTRIP TONIMeter ID: [id number]       Blood Culture - Blood, Hand, Right [[id number]]  (Normal) Collected: 04/07/23 1723    Specimen: Blood from Hand, Right Updated: 04/09/23 1731     Blood Culture No  growth at 2 days    Blood Culture - Blood, Arm, Right [[id number]]  (Normal) Collected: 04/07/23 1723    Specimen: Blood from Arm, Right Updated: 04/09/23 1731     Blood Culture No growth at 2 days    POC Glucose Once [[id number]]  (Normal) Collected: 04/09/23 1031    Specimen: Blood Updated: 04/09/23 1108     Glucose 95 mg/dL      Comment: : [id number] LOWRY DWAYNEMeter ID: [id number]             Imaging Results (Last 24 Hours)     ** No results found for the last 24 hours. **            Medications:   Scheduled Meds:atenolol, 100 mg, Nasogastric, Daily  ceFAZolin, 2 g, Intravenous, Q8H  chlordiazePOXIDE, 50 mg, Nasogastric, TID  chlorhexidine, 15 mL, Mouth/Throat, Q12H  docusate sodium, 100 mg, Nasogastric, Daily  enoxaparin, 40 mg, Subcutaneous, Daily  Insulin Aspart, 2-12 Units, Subcutaneous, 4x Daily AC & at Bedtime  LORazepam, 2 mg, Intravenous, Q2H  magnesium hydroxide, 10 mL, Nasogastric, Daily  nicotine, 1 patch, Transdermal, Q24H  pantoprazole, 40 mg, Intravenous, Q AM  PHENobarbital IVPB in 100 mL, 32.5 mg, Intravenous, Q8H  polyethylene glycol, 17 g, Nasogastric, Daily  senna-docusate sodium, 2 tablet, Nasogastric, BID  sodium chloride, 3 mL, Intracatheter, Q12H  thiamine (B-1) IV, 500 mg, Intravenous, TID      Continuous Infusions:lactated ringers, 75 mL/hr      PRN Meds:.•  acetaminophen  •  bisacodyl  •  dextrose  •  hydrALAZINE  •  LORazepam  •  naloxone  •  ondansetron  •  simethicone  •  sodium chloride  •  sodium chloride      Assessment:     * No active hospital problems. *    --Delirium Tremens  --Alcohol Withdrawal Syndrome, severe, with complication  --Alcohol Use disorder, severe  --Anxiety disorder  --Affective disorder            DIAGNOSTIC IMPRESSION: Improving, now extubated.       Treatment Plan & Recommendations:    HTN:               --Continued atenolol 100mg daily.              --Treat BP elevation with PRN ativan for withdrawal.      Alcohol Withdrawal/Delirium  Tremens:              --Cont Phenobart 32.5mg q8hrs.      --Can taper once Ativan is lower              --Cont CIIWA and PRN ativan              --Cont Ativan IV 2mg q2 hrs - slowly taper              --Cont ativan PRN for w/d s/sx   --ON chart review Librium has been added; will defer to primary team re: use, may be of benefit to augment Ativan taper; may wish to monitor LFTs.    --Cont Thiamine 500mg IV TID for wernicke's ppx       Anxiety & Affective Disorder:              --None current       -->All questions answered for the patient's family.    --> Impression and recommendations discussed with nursing staff.      Thank you for this consult.  Please contact me with any further questions or concerns.       Omkar Hope II, MD  Psychiatry & Behavioral Sciences  04/09/23 @ 23:12 CDT  Dictated using Dragon.

## 2023-04-11 ENCOUNTER — OUTSIDE FACILITY SERVICE (OUTPATIENT)
Dept: PULMONOLOGY | Facility: CLINIC | Age: 38
End: 2023-04-11
Payer: COMMERCIAL

## 2023-04-11 LAB
BASOPHILS # BLD AUTO: NORMAL 10*3/UL
BASOPHILS NFR BLD AUTO: NORMAL %
EOSINOPHIL # BLD AUTO: NORMAL 10*3/UL
EOSINOPHIL NFR BLD AUTO: NORMAL %
ERYTHROCYTE [DISTWIDTH] IN BLOOD BY AUTOMATED COUNT: NORMAL %
GLUCOSE BLDC GLUCOMTR-MCNC: 91 MG/DL (ref 70–130)
HCT VFR BLD AUTO: NORMAL %
HGB BLD-MCNC: NORMAL G/DL
LYMPHOCYTES # BLD AUTO: NORMAL 10*3/UL
LYMPHOCYTES NFR BLD AUTO: NORMAL %
MCH RBC QN AUTO: NORMAL PG
MCHC RBC AUTO-ENTMCNC: NORMAL G/DL
MCV RBC AUTO: NORMAL FL
MONOCYTES # BLD AUTO: NORMAL 10*3/UL
MONOCYTES NFR BLD AUTO: NORMAL %
NEUTROPHILS NFR BLD AUTO: NORMAL %
NEUTROPHILS NFR BLD AUTO: NORMAL %
PLATELET # BLD AUTO: NORMAL 10*3/UL
RBC # BLD AUTO: NORMAL 10*6/UL
WBC NRBC COR # BLD: NORMAL 10*3/UL

## 2023-04-11 PROCEDURE — 97535 SELF CARE MNGMENT TRAINING: CPT

## 2023-04-11 PROCEDURE — 99232 SBSQ HOSP IP/OBS MODERATE 35: CPT | Performed by: PSYCHIATRY & NEUROLOGY

## 2023-04-11 PROCEDURE — 25010000002 PHENOBARBITAL PER 120 MG: Performed by: INTERNAL MEDICINE

## 2023-04-11 PROCEDURE — 25010000002 THIAMINE PER 100 MG

## 2023-04-11 PROCEDURE — 82962 GLUCOSE BLOOD TEST: CPT

## 2023-04-11 PROCEDURE — 99232 SBSQ HOSP IP/OBS MODERATE 35: CPT | Performed by: INTERNAL MEDICINE

## 2023-04-11 PROCEDURE — 97530 THERAPEUTIC ACTIVITIES: CPT

## 2023-04-11 PROCEDURE — 25010000002 PHENOBARBITAL PER 120 MG: Performed by: PSYCHIATRY & NEUROLOGY

## 2023-04-11 PROCEDURE — 97110 THERAPEUTIC EXERCISES: CPT

## 2023-04-11 PROCEDURE — 25010000002 ENOXAPARIN PER 10 MG: Performed by: INTERNAL MEDICINE

## 2023-04-11 RX ORDER — SERTRALINE HYDROCHLORIDE 25 MG/1
25 TABLET, FILM COATED ORAL ONCE
Status: DISCONTINUED | OUTPATIENT
Start: 2023-04-11 | End: 2023-04-13 | Stop reason: HOSPADM

## 2023-04-11 RX ORDER — PHENOBARBITAL SODIUM 65 MG/ML
32.5 INJECTION INTRAMUSCULAR EVERY 12 HOURS
Status: DISCONTINUED | OUTPATIENT
Start: 2023-04-11 | End: 2023-04-12

## 2023-04-11 RX ORDER — VALACYCLOVIR HYDROCHLORIDE 500 MG/1
1000 TABLET, FILM COATED ORAL EVERY 12 HOURS SCHEDULED
Status: DISCONTINUED | OUTPATIENT
Start: 2023-04-11 | End: 2023-04-13 | Stop reason: HOSPADM

## 2023-04-11 RX ORDER — CHLORDIAZEPOXIDE HYDROCHLORIDE 25 MG/1
25 CAPSULE, GELATIN COATED ORAL 3 TIMES DAILY
Status: DISCONTINUED | OUTPATIENT
Start: 2023-04-11 | End: 2023-04-13 | Stop reason: HOSPADM
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
## 2023-04-11 NOTE — ACP (ADVANCE CARE PLANNING)
NUTRITION FOLLOW UP.    Pt eating very well on PO diet.  RD d/c'ed EN order.  #.  Last BM 4/10.  RD will follow LTACH course.

## 2023-04-11 NOTE — ACP (ADVANCE CARE PLANNING)
[facility] @ [facility]  INPATIENT PROGRESS NOTE    PATIENT NAME: Jackson Acuña      PHYSICIAN: Di Hopkins MD  : 1985        MRN: [id number]  Patient Care Team:  Brandon Darling MD as PCP - General  Brandon Darling MD as PCP - Family Medicine    Chief Complaint:  N/V syncope  History of Present Illness: Patient is a 38 y/o male who presented to [facility]  N/V/syncope and a period of hyperventilation after consuming alcohol, causing him to have withdrawal symptoms.  He was placed on supportive medications and requested to speak with psychiatry for alcohol cessation.  He in turn became severely agitated requiring intubation with mechanical ventilation in order to stablize him and maintain stabilization.  He was further noted with MSSA pneumonia and bacteremia and is currently completing course of antibiotic therapy.  He has arrived to this facility for mechanical ventilation weaning, continued withdrawal treatment, and therapy.  Psychiatric services to follow.  Patient remains lying in bed on mechanical ventilation and sedation.  He is stable.  I have reviewed patient medical records, discharge summary and labs and diagnostics independently.  All labs and diagnostics are listed below.     Assessment       Acute respiratory failure with hypoxia and hypercapnia  Fever  Staphylococcus aureus pneumonia  Delirium tremens  Anxiety disorder  Affective disorder  Alcohol withdrawal  Tansaminitis  Depression  Hypertension  Mitral valve prolapse  DVT & GI prphylaxis     DVT Prophylaxis: Lovenox  GI Prophylaxis: Famotidine  Code Status: Full     Plan     -Stable at time of exam.  -Continues to clinically improve.  -Medications changed to by mouth.  -Will initiate Valtrex for HSV  -Tube feeding remains on hold.  -Aggressive therapy and out of bed daily strongly recommended.  -Psychiatry follow up appreciated..  -DVT and GI prophylaxis in place.  -We'll continue  monitoring patient in hospital setting and treat patient as course dictates.  -Please review orders for detailed plan of care.  -For Acute and Chronic medical condition we will continue medications described below in medication section which have been reviewed and we will continue unless changed in plan of care.  -Laboratory and diagnostic studies have been independently reviewed and the reports are reviewed as documented below.    Subjective   Interval History:   Patient Complaints:  Patient seen and examined.  Sitting up in bed.  Awake and alert and oriented x3.  Complains of burning penile pain.  History taken from: Medical record, wife, nursing staff.    Review of Systems:    Review of Systems   Constitutional: Positive for activity change. Negative for chills and fever.   HENT: Negative.    Eyes: Negative.    Respiratory: Negative.  Negative for cough, chest tightness, shortness of breath and wheezing.    Cardiovascular: Negative.  Negative for chest pain and palpitations.   Gastrointestinal: Negative for abdominal distention, nausea and vomiting.   Endocrine: Negative.    Genitourinary: Positive for penile pain.   Musculoskeletal: Negative for back pain and myalgias.   Skin: Negative for color change and wound.   Neurological: Negative for dizziness and seizures.   Psychiatric/Behavioral: Negative for agitation, behavioral problems and confusion.   All other systems reviewed and are negative.      Objective   Vital Signs  Temp: 97.9 F         Heart Rate: 102         Resp: 18          Blood Pressure: 140/76         Pulse Ox: 95%    Physical Exam:   Physical Exam  Constitutional:       General: He is not in acute distress.     Appearance: He is ill-appearing.   HENT:      Head: Normocephalic and atraumatic.   Eyes:      Conjunctiva/sclera: Conjunctivae normal.      Pupils: Pupils are equal, round, and reactive to light.   Cardiovascular:      Rate and Rhythm: Normal rate and regular rhythm.      Pulses: Normal  pulses.      Heart sounds: Normal heart sounds. No murmur heard.  Pulmonary:      Effort: Pulmonary effort is normal. No respiratory distress.      Breath sounds: Normal breath sounds. No rales.   Abdominal:      General: Bowel sounds are normal. There is no distension.      Palpations: Abdomen is soft.      Tenderness: There is no guarding.   Musculoskeletal:      Cervical back: No rigidity or tenderness.      Right lower leg: No edema.      Left lower leg: No edema.   Skin:     General: Skin is warm and dry.      Capillary Refill: Capillary refill takes 2 to 3 seconds.   Neurological:      General: No focal deficit present.      Mental Status: He is oriented to person, place, and time. Mental status is at baseline.   Psychiatric:         Behavior: Behavior normal.         Judgment: Judgment normal.       Results Review:       Results from last 7 days   Lab Units 04/10/23  0531 04/07/23  0754 04/06/23  0623 04/05/23  0630   SODIUM mmol/L 140 140 141 140   POTASSIUM mmol/L 3.5 4.2 3.8 3.6   CHLORIDE mmol/L 102 104 105 104   CO2 mmol/L 26.0 22.0 22.0 24.0   BUN mg/dL 8 11 12 12   CREATININE mg/dL 0.58* 0.76 0.64* 0.58*   GLUCOSE mg/dL 89 93 122* 100*   CALCIUM mg/dL 9.3 9.5 9.3 9.4   BILIRUBIN mg/dL 0.2 0.3 0.2 0.2   ALK PHOS U/L 81 90 83 86   ALT (SGPT) U/L 14 15 19 22   AST (SGOT) U/L 19 16 19 18     Results from last 7 days   Lab Units 04/10/23  0531 04/07/23  0754 04/06/23  0623   MAGNESIUM mg/dL 2.1 2.5 2.3     Results from last 7 days   Lab Units 04/07/23  0634 04/06/23  0623 04/05/23  0630   WBC 10*3/mm3 13.10* 11.42* 12.16*   HEMOGLOBIN g/dL 12.7* 12.2* 12.1*   HEMATOCRIT % 37.9 37.0* 35.3*   PLATELETS 10*3/mm3 127* 123* 167     Lab Results   Component Value Date    CKTOTAL 121 06/16/2021    TROPONINT 7 03/22/2023     pH, Arterial   Date Value Ref Range Status   04/08/2023 7.454 (H) 7.350 - 7.450 pH units Final     Comment:     83 Value above reference range     CO2   Date Value Ref Range Status   04/10/2023  26.0 22.0 - 29.0 mmol/L Final         Imaging Results (Most Recent)     Procedure Component Value Units Date/Time    XR Abdomen KUB [[id number]] Collected: 04/07/23 1202     Updated: 04/07/23 1222    Narrative:      COMPARISON:  AP abdomen 4/5/2023    FINDINGS:  Enteric tube terminates in the third portion of the duodenum. No evidence of  bowel obstruction or gross free intraperitoneal air.    XR Abdomen KUB [[id number]] Collected: 04/05/23 1635     Updated: 04/05/23 1659    Narrative:      FINDINGS:  Weighted feeding tube tip is in the third portion of the duodenum.    XR Abdomen KUB [[id number]] Collected: 04/05/23 1512     Updated: 04/05/23 1535    Narrative:      FINDINGS:  A film of the upper abdomen shows a nasoenteric feeding tube with its tip in the  third portion of the duodenum.  This has advanced when compared with 3/24/2023.    XR Chest 1 View [[id number]] Collected: 04/05/23 1511     Updated: 04/05/23 1534    Narrative:      FINDINGS:  AP view of the chest shows some minimal volume loss in the left lung base.    No other active disease.    Endotracheal tube and nasoenteric feeding tube in place.  Midline catheter in  place within the right arm.           Blood Culture   Date Value Ref Range Status   04/02/2023 No growth at 4 days  Preliminary     BCID, PCR   Date Value Ref Range Status   04/02/2023 (A) Negative by BCID PCR. Culture to Follow. Final    Staph spp, not aureus or lugdunensis. Identification by BCID2 PCR.     No results found for: CULTURES, HSVCX, URCX  No results found for: EYECULTURE, GCCX, HSVCULTURE, LABHSV  No results found for: LEGIONELLA, MRSACX, MUMPSCX, MYCOPLASCX  No results found for: NOCARDIACX, STOOLCX  No results found for: THROATCX, UNSTIMCULT, URINECX, CULTURE, VZVCULTUR  No results found for: VIRALCULTU, WOUNDCX  Medication Reviewed and Will Continue Unless Documented in Plan:   atenolol, 100 mg, Nasogastric, Daily  chlordiazePOXIDE, 50 mg, Nasogastric, TID  chlorhexidine, 15  mL, Mouth/Throat, Q12H  docusate sodium, 100 mg, Nasogastric, Daily  enoxaparin, 40 mg, Subcutaneous, Daily  Insulin Aspart, 2-12 Units, Subcutaneous, 4x Daily AC & at Bedtime  LORazepam, 2 mg, Intravenous, Q2H  magnesium hydroxide, 10 mL, Nasogastric, Daily  nicotine, 1 patch, Transdermal, Q24H  pantoprazole, 40 mg, Intravenous, Q AM  PHENobarbital, 32.5 mg, Intravenous, Q8H  polyethylene glycol, 17 g, Nasogastric, Daily  potassium chloride, 40 mEq, Oral, Once  senna-docusate sodium, 2 tablet, Nasogastric, BID  sodium chloride, 3 mL, Intracatheter, Q12H  thiamine (B-1) IV, 500 mg, Intravenous, TID         •  acetaminophen  •  bisacodyl  •  dextrose  •  hydrALAZINE  •  LORazepam  •  naloxone  •  ondansetron  •  simethicone  •  sodium chloride  •  sodium chloride      Dietary Orders (From admission, onward)     Start     Ordered    04/10/23 1246  Diet: Regular/House Diet; Texture: Regular Texture (IDDSI 7); Fluid Consistency: Thin (IDDSI 0)  Diet Effective Now        References:    Diet Order Crosswalk   Question Answer Comment   Diets: Regular/House Diet    Texture: Regular Texture (IDDSI 7)    Fluid Consistency: Thin (IDDSI 0)        04/10/23 1245              History, physical exam, assessment and plan may have been partly or fully copied from before, but  changes made to the copied record to reflect care on the date of service. Part of the lab and imaging  reports auto populated and corrected. Some of this note may be an electronic transcription of spoken  language to printed text. This may permit erroneous, or at times, nonsensical words or phrases to be  inadvertently transcribed. Although I have reviewed the note for such errors, some may still exist.      This document has been electronically signed by Di Hopkins MD on April 11, 2023 09:41 CDT

## 2023-04-12 ENCOUNTER — OUTSIDE FACILITY SERVICE (OUTPATIENT)
Dept: PULMONOLOGY | Facility: CLINIC | Age: 38
End: 2023-04-12
Payer: COMMERCIAL

## 2023-04-12 LAB
BACTERIA SPEC AEROBE CULT: NORMAL
BACTERIA SPEC AEROBE CULT: NORMAL
GLUCOSE BLDC GLUCOMTR-MCNC: 90 MG/DL (ref 70–130)

## 2023-04-12 PROCEDURE — 97116 GAIT TRAINING THERAPY: CPT

## 2023-04-12 PROCEDURE — 99232 SBSQ HOSP IP/OBS MODERATE 35: CPT | Performed by: PSYCHIATRY & NEUROLOGY

## 2023-04-12 PROCEDURE — 25010000002 THIAMINE PER 100 MG

## 2023-04-12 PROCEDURE — 97110 THERAPEUTIC EXERCISES: CPT

## 2023-04-12 PROCEDURE — 82962 GLUCOSE BLOOD TEST: CPT

## 2023-04-12 PROCEDURE — 25010000002 PHENOBARBITAL PER 120 MG: Performed by: PSYCHIATRY & NEUROLOGY

## 2023-04-12 PROCEDURE — 97530 THERAPEUTIC ACTIVITIES: CPT

## 2023-04-12 RX ORDER — PANTOPRAZOLE SODIUM 40 MG/1
40 TABLET, DELAYED RELEASE ORAL
Status: DISCONTINUED | OUTPATIENT
Start: 2023-04-12 | End: 2023-04-13 | Stop reason: HOSPADM

## 2023-04-12 NOTE — ACP (ADVANCE CARE PLANNING)
[facility] @ [facility]  INPATIENT PROGRESS NOTE    PATIENT NAME: Jackson Acuña      PHYSICIAN: Di Hopkins MD  : 1985        MRN: [id number]  Patient Care Team:  Brandon Darling MD as PCP - General  Brandon Darling MD as PCP - Family Medicine    Chief Complaint:  N/V syncope  History of Present Illness: Patient is a 38 y/o male who presented to [facility]  N/V/syncope and a period of hyperventilation after consuming alcohol, causing him to have withdrawal symptoms.  He was placed on supportive medications and requested to speak with psychiatry for alcohol cessation.  He in turn became severely agitated requiring intubation with mechanical ventilation in order to stablize him and maintain stabilization.  He was further noted with MSSA pneumonia and bacteremia and is currently completing course of antibiotic therapy.  He has arrived to this facility for mechanical ventilation weaning, continued withdrawal treatment, and therapy.  Psychiatric services to follow.  Patient remains lying in bed on mechanical ventilation and sedation.  He is stable.  I have reviewed patient medical records, discharge summary and labs and diagnostics independently.  All labs and diagnostics are listed below.     Assessment       Acute respiratory failure with hypoxia and hypercapnia  Fever  Staphylococcus aureus pneumonia  Delirium tremens  Anxiety disorder  Affective disorder  Alcohol withdrawal  Tansaminitis  Depression  Hypertension  Mitral valve prolapse  DVT & GI prphylaxis     DVT Prophylaxis: Lovenox  GI Prophylaxis: Famotidine  Code Status: Full     Plan     -Stable at time of exam.  -Continues to clinically improve.  -States manjula area has improved  -NG tube d/c'd as well as tube feeding  -Mental status improved.  -Aggressive therapy and out of bed daily strongly recommended.  -Psychiatric medications initiated via psych.  -Psychiatry follow up appreciated..  -DVT  and GI prophylaxis in place.  -We'll continue monitoring patient in hospital setting and treat patient as course dictates.  -Please review orders for detailed plan of care.  -For Acute and Chronic medical condition we will continue medications described below in medication section which have been reviewed and we will continue unless changed in plan of care.  -Laboratory and diagnostic studies have been independently reviewed and the reports are reviewed as documented below.    Subjective   Interval History:   Patient Complaints:  Patient seen and examined.  Up ambulating in [facility] with therapy.  No concerns.  Eager to go home.  History taken from: Medical record, wife, nursing staff.    Review of Systems:    Review of Systems   Constitutional: Positive for activity change. Negative for chills and fever.   HENT: Negative.    Eyes: Negative.    Respiratory: Negative.  Negative for cough, chest tightness, shortness of breath and wheezing.    Cardiovascular: Negative.  Negative for chest pain and palpitations.   Gastrointestinal: Negative for abdominal distention, nausea and vomiting.   Endocrine: Negative.    Genitourinary: Positive for penile pain.   Musculoskeletal: Negative for back pain and myalgias.   Skin: Negative for color change and wound.   Neurological: Negative for dizziness and seizures.   Psychiatric/Behavioral: Negative for agitation, behavioral problems and confusion.   All other systems reviewed and are negative.      Objective   Vital Signs  Temp: 97.9 F         Heart Rate: 76         Resp: 19          Blood Pressure: 103/62         Pulse Ox: 92%    Physical Exam:   Physical Exam  Constitutional:       General: He is not in acute distress.     Appearance: He is ill-appearing.   HENT:      Head: Normocephalic and atraumatic.   Eyes:      Conjunctiva/sclera: Conjunctivae normal.      Pupils: Pupils are equal, round, and reactive to light.   Cardiovascular:      Rate and Rhythm: Normal rate and regular  rhythm.      Pulses: Normal pulses.      Heart sounds: Normal heart sounds. No murmur heard.  Pulmonary:      Effort: Pulmonary effort is normal. No respiratory distress.      Breath sounds: Normal breath sounds. No rales.   Abdominal:      General: Bowel sounds are normal. There is no distension.      Palpations: Abdomen is soft.      Tenderness: There is no guarding.   Musculoskeletal:      Cervical back: No rigidity or tenderness.      Right lower leg: No edema.      Left lower leg: No edema.   Skin:     General: Skin is warm and dry.      Capillary Refill: Capillary refill takes 2 to 3 seconds.   Neurological:      General: No focal deficit present.      Mental Status: He is oriented to person, place, and time. Mental status is at baseline.   Psychiatric:         Behavior: Behavior normal.         Judgment: Judgment normal.       Results Review:       Results from last 7 days   Lab Units 04/10/23  0531 04/07/23  0754 04/06/23  0623   SODIUM mmol/L 140 140 141   POTASSIUM mmol/L 3.5 4.2 3.8   CHLORIDE mmol/L 102 104 105   CO2 mmol/L 26.0 22.0 22.0   BUN mg/dL 8 11 12   CREATININE mg/dL 0.58* 0.76 0.64*   GLUCOSE mg/dL 89 93 122*   CALCIUM mg/dL 9.3 9.5 9.3   BILIRUBIN mg/dL 0.2 0.3 0.2   ALK PHOS U/L 81 90 83   ALT (SGPT) U/L 14 15 19   AST (SGOT) U/L 19 16 19     Results from last 7 days   Lab Units 04/10/23  0531 04/07/23  0754 04/06/23  0623   MAGNESIUM mg/dL 2.1 2.5 2.3     Results from last 7 days   Lab Units 04/07/23  0634 04/06/23  0623   WBC 10*3/mm3 13.10* 11.42*   HEMOGLOBIN g/dL 12.7* 12.2*   HEMATOCRIT % 37.9 37.0*   PLATELETS 10*3/mm3 127* 123*     Lab Results   Component Value Date    CKTOTAL 121 06/16/2021    TROPONINT 7 03/22/2023     CO2   Date Value Ref Range Status   04/10/2023 26.0 22.0 - 29.0 mmol/L Final         Imaging Results (Most Recent)     Procedure Component Value Units Date/Time    XR Abdomen KUB [[id number]] Collected: 04/07/23 1202     Updated: 04/07/23 1222    Narrative:       COMPARISON:  AP abdomen 4/5/2023    FINDINGS:  Enteric tube terminates in the third portion of the duodenum. No evidence of  bowel obstruction or gross free intraperitoneal air.    XR Abdomen KUB [[id number]] Collected: 04/05/23 1635     Updated: 04/05/23 1659    Narrative:      FINDINGS:  Weighted feeding tube tip is in the third portion of the duodenum.    XR Abdomen KUB [[id number]] Collected: 04/05/23 1512     Updated: 04/05/23 1535    Narrative:      FINDINGS:  A film of the upper abdomen shows a nasoenteric feeding tube with its tip in the  third portion of the duodenum.  This has advanced when compared with 3/24/2023.    XR Chest 1 View [[id number]] Collected: 04/05/23 1511     Updated: 04/05/23 1534    Narrative:      FINDINGS:  AP view of the chest shows some minimal volume loss in the left lung base.    No other active disease.    Endotracheal tube and nasoenteric feeding tube in place.  Midline catheter in  place within the right arm.           Blood Culture   Date Value Ref Range Status   04/02/2023 No growth at 4 days  Preliminary     BCID, PCR   Date Value Ref Range Status   04/02/2023 (A) Negative by BCID PCR. Culture to Follow. Final    Staph spp, not aureus or lugdunensis. Identification by BCID2 PCR.     No results found for: CULTURES, HSVCX, URCX  No results found for: EYECULTURE, GCCX, HSVCULTURE, LABHSV  No results found for: LEGIONELLA, MRSACX, MUMPSCX, MYCOPLASCX  No results found for: NOCARDIACX, STOOLCX  No results found for: THROATCX, UNSTIMCULT, URINECX, CULTURE, VZVCULTUR  No results found for: VIRALCULTU, WOUNDCX  Medication Reviewed and Will Continue Unless Documented in Plan:   atenolol, 100 mg, Nasogastric, Daily  chlordiazePOXIDE, 25 mg, Nasogastric, TID  nicotine, 1 patch, Transdermal, Q24H  pantoprazole, 40 mg, Oral, Q AM  PHENobarbital, 32.5 mg, Intravenous, Q12H  potassium chloride, 40 mEq, Oral, Once  sertraline, 25 mg, Oral, Once   Followed by  sertraline, 50 mg, Oral,  Daily  sodium chloride, 3 mL, Intracatheter, Q12H  thiamine (B-1) IV, 500 mg, Intravenous, TID  valACYclovir, 1,000 mg, Oral, Q12H         •  acetaminophen  •  bisacodyl  •  dextrose  •  hydrALAZINE  •  naloxone  •  ondansetron  •  simethicone  •  sodium chloride  •  sodium chloride      Dietary Orders (From admission, onward)     Start     Ordered    04/10/23 1246  Diet: Regular/House Diet; Texture: Regular Texture (IDDSI 7); Fluid Consistency: Thin (IDDSI 0)  Diet Effective Now        References:    Diet Order Crosswalk   Question Answer Comment   Diets: Regular/House Diet    Texture: Regular Texture (IDDSI 7)    Fluid Consistency: Thin (IDDSI 0)        04/10/23 1245              History, physical exam, assessment and plan may have been partly or fully copied from before, but  changes made to the copied record to reflect care on the date of service. Part of the lab and imaging  reports auto populated and corrected. Some of this note may be an electronic transcription of spoken  language to printed text. This may permit erroneous, or at times, nonsensical words or phrases to be  inadvertently transcribed. Although I have reviewed the note for such errors, some may still exist.      This document has been electronically signed by Di Hopkins MD on April 12, 2023 08:33 CDT

## 2023-04-12 NOTE — PROGRESS NOTES
Psychiatry & Behavioral Health Inpatient Consult Progress Note                                      4/12/2023      Referring Provider: Dr. Hopkins / [facility]    Reason for Consultation & CC: Substance Related Withdrawal       Subjective --  Mr. Jackson Acuña is a 37 y.o. male who was seen on the LTAC unit.        As of today, 04/12/23:    Patient is oriented to person, place, year, month and situation.  Patient's wife is present.  Patient is focused on being physically stronger and hoping to go home soon.  He notes the IV med is making him feeling loopy.  It is the phenobarb that he is referring to.  Patient is amenable to naltrexone for cravings.  His last dose on fentanyl was on the morning of the 5th.  No other opiates present.    Objective   Objective --    Vital Signs:  Heart Rate:  [113] 113         As of today, 04/12/23:    Physical Exam:   -General Appearance:  fatigued  -Hygiene:  Adequate   -Gait & Station:  Deferred, in bed  -Musculoskeletal:  No tremors or abnormal involuntary movements and No atrophy noted  -Pulm: unlaboured     Mental Status Exam:   Cooperation: good  Eye Contact:  downcast at times  Psychomotor Behavior:  slow but appropriate  Mood: improving  Affect:  mood congruent  Speech:  soft but clear  Thought Process:  coherent  Associations: goal oriented  Thought Content:                mood congruent              Suicidal:  denies              Homicidal:  None               Hallucinations:  Not demonstrated today                Delusion:  None expressed  Cognitive Functioning:              Consciousness: alert and oriented  Reliability:  adequate  Insight:  diminished  Judgement:  diminished  Impulse Control:  adquate      Diagnostic Data --  Lab Results (last 24 hours)     Procedure Component Value Units Date/Time    POC Glucose Once [[id number]]  (Normal) Collected: 04/12/23 1111    Specimen: Blood Updated: 04/12/23 1129     Glucose 90 mg/dL      Comment:  : [id number] AALIYAH Panchaler ID: [id number]       Blood Culture - Blood, Hand, Right [[id number]]  (Normal) Collected: 04/07/23 1723    Specimen: Blood from Hand, Right Updated: 04/11/23 1731     Blood Culture No growth at 4 days    Blood Culture - Blood, Arm, Right [[id number]]  (Normal) Collected: 04/07/23 1723    Specimen: Blood from Arm, Right Updated: 04/11/23 1731     Blood Culture No growth at 4 days          Imaging Results (Last 24 Hours)     ** No results found for the last 24 hours. **            Medications:   Scheduled Meds:atenolol, 100 mg, Nasogastric, Daily  chlordiazePOXIDE, 25 mg, Nasogastric, TID  nicotine, 1 patch, Transdermal, Q24H  pantoprazole, 40 mg, Oral, Q AM  PHENobarbital, 32.5 mg, Intravenous, Q12H  potassium chloride, 40 mEq, Oral, Once  sertraline, 25 mg, Oral, Once   Followed by  sertraline, 50 mg, Oral, Daily  sodium chloride, 3 mL, Intracatheter, Q12H  thiamine (B-1) IV, 500 mg, Intravenous, TID  valACYclovir, 1,000 mg, Oral, Q12H      Continuous Infusions:   PRN Meds:.•  acetaminophen  •  bisacodyl  •  dextrose  •  hydrALAZINE  •  naloxone  •  ondansetron  •  simethicone  •  sodium chloride  •  sodium chloride      Assessment:     * No active hospital problems. *    --Delirium Tremens  --Alcohol Withdrawal Syndrome, severe, with complication  --Alcohol Use disorder, severe  --Anxiety disorder: PTSD  --Depressive disorder  --HTN         DIAGNOSTIC IMPRESSION: Improving, now extubated.       Treatment Plan & Recommendations:    HTN:               --Continue atenolol 100mg daily.              --Treat BP elevation with PRN ativan for withdrawal.      Alcohol Withdrawal/Delirium Tremens:              --Stop Phenobarb 32.5mg q12hrs.              --Cont librium 25mg tid   --Cont Thiamine 500mg IV TID for wernicke's ppx. Plan to stop at discharge.   --Plan for naltrexone at discharge for cravings.     Anxiety & Depressive Disorder:              --Cont zoloft 50mg daily   --Consider  prazosin for nightmares.       -->All questions answered for the patient.    --> Impression and recommendations discussed with nursing staff.      Thank you for this consult.  Please contact me with any further questions or concerns.       Tanya Perez MD  Psychiatry & Behavioral Sciences  04/12/23 @ 13:36 CDT  Dictated using Dragon.

## 2023-04-13 VITALS — HEART RATE: 68 BPM

## 2023-04-13 LAB
GLUCOSE BLDC GLUCOMTR-MCNC: 100 MG/DL (ref 70–130)
GLUCOSE BLDC GLUCOMTR-MCNC: 102 MG/DL (ref 70–130)
GLUCOSE BLDC GLUCOMTR-MCNC: 88 MG/DL (ref 70–130)
GLUCOSE BLDC GLUCOMTR-MCNC: 93 MG/DL (ref 70–130)
GLUCOSE BLDC GLUCOMTR-MCNC: 96 MG/DL (ref 70–130)
GLUCOSE BLDC GLUCOMTR-MCNC: 97 MG/DL (ref 70–130)

## 2023-04-13 PROCEDURE — 25010000002 THIAMINE PER 100 MG

## 2023-04-13 NOTE — ACP (ADVANCE CARE PLANNING)
[facility] @ [facility]  DISCHARGE SUMMARY    PATIENT NAME: Jackson Acuña      PHYSICIAN: Di Hopkins MD  : 1985        MRN: [id number]  Patient Care Team:  Brandon Darling MD as PCP - General  Brandon Darling MD as PCP - Family Medicine      Date of Discharge:  2023    Discharge Diagnosis:   Acute respiratory failure with hypoxia and hypercapnia  Fever  Staphylococcus aureus pneumonia  Delirium tremens  Anxiety disorder  Affective disorder  Alcohol withdrawal  Tansaminitis  Depression  Hypertension  Mitral valve prolapse  DVT & GI prphylaxis    Presenting Problem/History of Present Illness  Chief Complaint:  N/V syncope  History of Present Illness: Patient is a 38 y/o male who presented to [facility]  N/V/syncope and a period of hyperventilation after consuming alcohol, causing him to have withdrawal symptoms.  He was placed on supportive medications and requested to speak with psychiatry for alcohol cessation.  He in turn became severely agitated requiring intubation with mechanical ventilation in order to stablize him and maintain stabilization.  He was further noted with MSSA pneumonia and bacteremia and is currently completing course of antibiotic therapy.  He has arrived to this facility for mechanical ventilation weaning, continued withdrawal treatment, and therapy.  Psychiatric services to follow.  Patient remains lying in bed on mechanical ventilation and sedation.  He is stable.  I have reviewed patient medical records, discharge summary and labs and diagnostics independently.  All labs and diagnostics are listed below.     Hospital Course  Patient is a 37 y.o. male was admitted to our facility for ventilatory weaning, pulmonary rehabilitation, and therapy, prior to returning home.  Patient completed course of antibiotics.  He was able to successfully extubate on 2023.  Patient had some confusion, however, his cognition  significantly improved.  Psychiatric services followed patient with medication management for his depression, alcohol withdrawal, and cravings.  Patient today, is up ambulating in [place].  He is alert and oriented to self and surroundings.  He denies any pain.  He voices no concerns.  He remains hemodynamically stable for discharge.    Procedures Performed:  None    Consults:   Consults     Date and Time Order Name Status Description    3/23/2023 10:41 AM Inpatient Psychiatrist Consult Completed           Pertinent Test Results:   Lab Results (last 24 hours)     Procedure Component Value Units Date/Time    POC Glucose Once [[id number]]  (Normal) Collected: 04/07/23 1633    Specimen: Blood Updated: 04/13/23 1234     Glucose 102 mg/dL      Comment: : [id number] ANN DEANNAMeter ID: [id number]       POC Glucose Once [[id number]]  (Normal) Collected: 04/07/23 0508    Specimen: Blood Updated: 04/13/23 1234     Glucose 96 mg/dL      Comment: : [id number] GUILLE Urbinaer ID: [id number]       POC Glucose Once [[id number]]  (Normal) Collected: 04/08/23 1611    Specimen: Blood Updated: 04/13/23 1112     Glucose 97 mg/dL      Comment: : [id number] ANN DEANNAMeter ID: [id number]       POC Glucose Once [[id number]]  (Normal) Collected: 04/10/23 1021    Specimen: Blood Updated: 04/13/23 0849     Glucose 88 mg/dL      Comment: : [id number] SMITH BRITTANIMeter ID: [id number]       POC Glucose Once [[id number]]  (Normal) Collected: 04/10/23 1549    Specimen: Blood Updated: 04/13/23 0847     Glucose 93 mg/dL      Comment: : [id number] SMITH BRITTANIMeter ID: [id number]       POC Glucose Once [[id number]]  (Normal) Collected: 04/09/23 1557    Specimen: Blood Updated: 04/13/23 0847     Glucose 100 mg/dL      Comment: : [id number] SMITH BRITTANIMeter ID: [id number]       Blood Culture - Blood, Arm, Right [[id number]]  (Normal) Collected: 04/07/23 1723    Specimen: Blood from Arm, Right Updated:  04/12/23 1731     Blood Culture No growth at 5 days    Blood Culture - Blood, Hand, Right [[id number]]  (Normal) Collected: 04/07/23 1723    Specimen: Blood from Hand, Right Updated: 04/12/23 1731     Blood Culture No growth at 5 days            Condition on Discharge:  Stable    Vital Signs  Temp: 97.9 F         Heart Rate: 86         Resp: 18          Blood Pressure: 110/70         Pulse Ox: 96%    Physical Exam:   Physical Exam  Constitutional:       General: He is not in acute distress.     Appearance: Normal appearance.   HENT:      Head: Normocephalic and atraumatic.   Eyes:      Conjunctiva/sclera: Conjunctivae normal.      Pupils: Pupils are equal, round, and reactive to light.   Cardiovascular:      Rate and Rhythm: Normal rate and regular rhythm.      Pulses: Normal pulses.      Heart sounds: Normal heart sounds. No murmur heard.  Pulmonary:      Effort: Pulmonary effort is normal. No respiratory distress.      Breath sounds: Normal breath sounds. No rales.   Abdominal:      General: Bowel sounds are normal. There is no distension.      Palpations: Abdomen is soft.      Tenderness: There is no guarding.   Musculoskeletal:      Cervical back: No rigidity or tenderness.      Right lower leg: No edema.      Left lower leg: No edema.   Skin:     General: Skin is warm and dry.      Capillary Refill: Capillary refill takes 2 to 3 seconds.   Neurological:      General: No focal deficit present.      Mental Status: He is oriented to person, place, and time. Mental status is at baseline.   Psychiatric:         Behavior: Behavior normal.         Judgment: Judgment normal.         LABS Reviewed Prior to Discharge:  Results from last 7 days   Lab Units 04/10/23  0531 04/07/23  0754   SODIUM mmol/L 140 140   POTASSIUM mmol/L 3.5 4.2   CHLORIDE mmol/L 102 104   CO2 mmol/L 26.0 22.0   BUN mg/dL 8 11   CREATININE mg/dL 0.58* 0.76   GLUCOSE mg/dL 89 93   CALCIUM mg/dL 9.3 9.5   BILIRUBIN mg/dL 0.2 0.3   ALK PHOS U/L 81 90    ALT (SGPT) U/L 14 15   AST (SGOT) U/L 19 16       Results from last 7 days   Lab Units 04/10/23  0531 04/07/23  0754   MAGNESIUM mg/dL 2.1 2.5       Results from last 7 days   Lab Units 04/07/23  0634   WBC 10*3/mm3 13.10*   HEMOGLOBIN g/dL 12.7*   HEMATOCRIT % 37.9   PLATELETS 10*3/mm3 127*       Discharge Disposition:  Home    Discharge Medications     Discharge Medications      ASK your doctor about these medications      Instructions Start Date   atenolol 100 MG tablet  Commonly known as: TENORMIN   100 mg, Oral, Daily      bisacodyl 5 MG EC tablet  Commonly known as: DULCOLAX   5 mg, Oral, Daily PRN      bisacodyl 10 MG suppository  Commonly known as: DULCOLAX   10 mg, Rectal, Daily PRN      ceFAZolin in 0.9% normal saline 2 GM/ 100 mL solution IVPB  Commonly known as: ANCEF   2 g, Intravenous, Every 8 Hours      docusate sodium 50 mg/5 mL liquid  Commonly known as: COLACE   100 mg, Oral, Daily      hydrALAZINE 20 MG/ML injection  Commonly known as: APRESOLINE   10 mg, Intravenous, Every 6 Hours PRN      LORazepam 100 mg in sodium chloride 0.9 % 50 mL   0.5-10 mg/hr (0.5-10 mg/hr), Intravenous, Titrated      magnesium hydroxide 2400 MG/10ML suspension suspension  Commonly known as: MILK OF MAGNESIA   10 mL, Oral, Daily      nicotine 21 MG/24HR patch  Commonly known as: NICODERM CQ   1 patch, Transdermal, Every 24 Hours      pantoprazole 40 MG injection  Commonly known as: PROTONIX   40 mg, Intravenous, Every Early Morning      PHENobarbital 32.5 mg in sodium chloride 0.9 % 100 mL IVPB   32.5 mg, Intravenous, Every 8 Hours      polyethylene glycol 17 g packet  Commonly known as: MIRALAX   17 g, Oral, Daily      propofol 10 mg/mL emulsion infusion  Commonly known as: DIPRIVAN   5-100 mcg/kg/min (427-8,540 mcg/min), Intravenous, Titrated      sennosides-docusate 8.6-50 MG per tablet  Commonly known as: PERICOLACE   2 tablets, Oral, 2 Times Daily             Discharge Diet:  Cardiac    Activity at Discharge: As  tolerated    Follow-up Appointments:  See discharge reconciliation for detailed list of medications and discharge instructions.      Test Results Pending at Discharge:  None         Time: Discharge 39 min  History, physical exam, assessment and plan may have been partly or fully copied from before, but  changes made to the copied record to reflect care on the date of service. Part of the lab and imaging  reports auto populated and corrected. Some of this note may be an electronic transcription of spoken  language to printed text. This may permit erroneous, or at times, nonsensical words or phrases to be  inadvertently transcribed. Although I have reviewed the note for such errors, some may still exist.      This document has been electronically signed by Di Hopkins MD on April 13, 2023 16:31 CDT